# Patient Record
Sex: MALE | Race: WHITE | NOT HISPANIC OR LATINO | Employment: OTHER | URBAN - METROPOLITAN AREA
[De-identification: names, ages, dates, MRNs, and addresses within clinical notes are randomized per-mention and may not be internally consistent; named-entity substitution may affect disease eponyms.]

---

## 2023-04-03 LAB
ALBUMIN (G/DL) IN SER/PLAS: 4.1 G/DL (ref 3.4–5)
ANION GAP IN SER/PLAS: 15 MMOL/L (ref 10–20)
CALCIUM (MG/DL) IN SER/PLAS: 9.6 MG/DL (ref 8.6–10.3)
CARBON DIOXIDE, TOTAL (MMOL/L) IN SER/PLAS: 25 MMOL/L (ref 21–32)
CHLORIDE (MMOL/L) IN SER/PLAS: 104 MMOL/L (ref 98–107)
CREATININE (MG/DL) IN SER/PLAS: 1.51 MG/DL (ref 0.5–1.3)
GFR MALE: 47 ML/MIN/1.73M2
GLUCOSE (MG/DL) IN SER/PLAS: 100 MG/DL (ref 74–99)
MAGNESIUM (MG/DL) IN SER/PLAS: 2.3 MG/DL (ref 1.6–2.4)
NATRIURETIC PEPTIDE B (PG/ML) IN SER/PLAS: 811 PG/ML (ref 0–99)
PHOSPHATE (MG/DL) IN SER/PLAS: 3.7 MG/DL (ref 2.5–4.9)
POTASSIUM (MMOL/L) IN SER/PLAS: 4.2 MMOL/L (ref 3.5–5.3)
SODIUM (MMOL/L) IN SER/PLAS: 140 MMOL/L (ref 136–145)
UREA NITROGEN (MG/DL) IN SER/PLAS: 36 MG/DL (ref 6–23)

## 2023-04-19 ENCOUNTER — HOSPITAL ENCOUNTER (OUTPATIENT)
Dept: DATA CONVERSION | Facility: HOSPITAL | Age: 78
End: 2023-04-19
Attending: INTERNAL MEDICINE | Admitting: INTERNAL MEDICINE
Payer: MEDICARE

## 2023-04-19 DIAGNOSIS — I20.9 ANGINA PECTORIS, UNSPECIFIED (CMS-HCC): ICD-10-CM

## 2023-04-19 DIAGNOSIS — I10 ESSENTIAL (PRIMARY) HYPERTENSION: ICD-10-CM

## 2023-04-19 DIAGNOSIS — I47.29 OTHER VENTRICULAR TACHYCARDIA (MULTI): ICD-10-CM

## 2023-04-19 DIAGNOSIS — E85.9 AMYLOIDOSIS, UNSPECIFIED (MULTI): ICD-10-CM

## 2023-04-19 DIAGNOSIS — E78.5 HYPERLIPIDEMIA, UNSPECIFIED: ICD-10-CM

## 2023-04-19 DIAGNOSIS — N19 UNSPECIFIED KIDNEY FAILURE: ICD-10-CM

## 2023-04-19 DIAGNOSIS — I25.10 ATHEROSCLEROTIC HEART DISEASE OF NATIVE CORONARY ARTERY WITHOUT ANGINA PECTORIS: ICD-10-CM

## 2023-04-19 DIAGNOSIS — R94.39 ABNORMAL RESULT OF OTHER CARDIOVASCULAR FUNCTION STUDY: ICD-10-CM

## 2023-04-19 DIAGNOSIS — I42.9 CARDIOMYOPATHY, UNSPECIFIED (MULTI): ICD-10-CM

## 2023-04-19 LAB
FIO2: 21 %
FIO2: 21 % (ref 21–100)
PATIENT TEMPERATURE: 37 DEGREES
PATIENT TEMPERATURE: 37 DEGREES C
POCT BASE EXCESS, ARTERIAL: -2.6 MMOL/L (ref -2–3)
POCT BASE EXCESS, MIXED: 1.5 MMOL/L
POCT HCO3, ARTERIAL: 21.8 MMOL/L (ref 22–26)
POCT HCO3, MIXED: 26.6 MMOL/L
POCT OXY HEMOGLOBIN, ARTERIAL: 89.1 % (ref 94–98)
POCT OXY HEMOGLOBIN, MIXED: 62.2 % (ref 45–75)
POCT PCO2, ARTERIAL: 36 MMHG (ref 38–42)
POCT PCO2, MIXED: 43 MMHG
POCT PH, ARTERIAL: 7.39 (ref 7.38–7.42)
POCT PH, MIXED: 7.4
POCT PO2, ARTERIAL: 61 MMHG (ref 85–95)
POCT PO2, MIXED: 36 MMHG
POCT SO2, ARTERIAL: 91 % (ref 94–100)
POCT SO2, MIXED: 63 %
SITE OF ARTERIAL PUNCTURE: ABNORMAL

## 2023-04-20 LAB
ATRIAL RATE: 50 BPM
Q ONSET: 167 MS
QRS COUNT: 16 BEATS
QRS DURATION: 190 MS
QT INTERVAL: 466 MS
QTC CALCULATION(BAZETT): 591 MS
QTC FREDERICIA: 546 MS
R AXIS: 257 DEGREES
T AXIS: 50 DEGREES
T OFFSET: 400 MS
VENTRICULAR RATE: 97 BPM

## 2023-04-24 LAB
ALBUMIN (G/DL) IN SER/PLAS: 4.1 G/DL (ref 3.4–5)
ANION GAP IN SER/PLAS: 11 MMOL/L (ref 10–20)
CALCIUM (MG/DL) IN SER/PLAS: 9.6 MG/DL (ref 8.6–10.3)
CARBON DIOXIDE, TOTAL (MMOL/L) IN SER/PLAS: 28 MMOL/L (ref 21–32)
CHLORIDE (MMOL/L) IN SER/PLAS: 106 MMOL/L (ref 98–107)
CREATININE (MG/DL) IN SER/PLAS: 1.42 MG/DL (ref 0.5–1.3)
GFR MALE: 50 ML/MIN/1.73M2
GLUCOSE (MG/DL) IN SER/PLAS: 83 MG/DL (ref 74–99)
PHOSPHATE (MG/DL) IN SER/PLAS: 2.7 MG/DL (ref 2.5–4.9)
POTASSIUM (MMOL/L) IN SER/PLAS: 4.6 MMOL/L (ref 3.5–5.3)
SODIUM (MMOL/L) IN SER/PLAS: 140 MMOL/L (ref 136–145)
UREA NITROGEN (MG/DL) IN SER/PLAS: 28 MG/DL (ref 6–23)

## 2023-05-24 LAB
ALBUMIN (G/DL) IN SER/PLAS: 4.1 G/DL (ref 3.4–5)
ANION GAP IN SER/PLAS: 11 MMOL/L (ref 10–20)
CALCIUM (MG/DL) IN SER/PLAS: 9 MG/DL (ref 8.6–10.3)
CARBON DIOXIDE, TOTAL (MMOL/L) IN SER/PLAS: 28 MMOL/L (ref 21–32)
CHLORIDE (MMOL/L) IN SER/PLAS: 104 MMOL/L (ref 98–107)
CREATININE (MG/DL) IN SER/PLAS: 1.61 MG/DL (ref 0.5–1.3)
GFR MALE: 43 ML/MIN/1.73M2
GLUCOSE (MG/DL) IN SER/PLAS: 102 MG/DL (ref 74–99)
NATRIURETIC PEPTIDE B (PG/ML) IN SER/PLAS: 942 PG/ML (ref 0–99)
PHOSPHATE (MG/DL) IN SER/PLAS: 3.2 MG/DL (ref 2.5–4.9)
POTASSIUM (MMOL/L) IN SER/PLAS: 4 MMOL/L (ref 3.5–5.3)
SODIUM (MMOL/L) IN SER/PLAS: 139 MMOL/L (ref 136–145)
UREA NITROGEN (MG/DL) IN SER/PLAS: 35 MG/DL (ref 6–23)

## 2023-06-26 ENCOUNTER — PATIENT OUTREACH (OUTPATIENT)
Dept: PRIMARY CARE | Facility: CLINIC | Age: 78
End: 2023-06-26
Payer: MEDICARE

## 2023-06-26 RX ORDER — METOPROLOL SUCCINATE 50 MG/1
1 TABLET, EXTENDED RELEASE ORAL DAILY
COMMUNITY

## 2023-06-26 RX ORDER — NITROGLYCERIN 0.4 MG/1
0.4 TABLET SUBLINGUAL EVERY 5 MIN PRN
COMMUNITY
End: 2024-05-19 | Stop reason: HOSPADM

## 2023-06-26 RX ORDER — OXYBUTYNIN CHLORIDE 10 MG/1
1 TABLET, EXTENDED RELEASE ORAL DAILY
COMMUNITY
Start: 2017-02-03 | End: 2024-05-19 | Stop reason: HOSPADM

## 2023-06-26 RX ORDER — AMIODARONE HYDROCHLORIDE 200 MG/1
TABLET ORAL DAILY
COMMUNITY
Start: 2023-06-23 | End: 2023-12-04 | Stop reason: ALTCHOICE

## 2023-06-26 RX ORDER — TORSEMIDE 20 MG/1
1 TABLET ORAL DAILY
COMMUNITY
Start: 2023-04-03 | End: 2024-01-08 | Stop reason: SDUPTHER

## 2023-06-26 RX ORDER — DAPAGLIFLOZIN 10 MG/1
1 TABLET, FILM COATED ORAL DAILY
COMMUNITY
Start: 2022-07-11 | End: 2024-01-08 | Stop reason: SDUPTHER

## 2023-06-26 RX ORDER — TAFAMIDIS 61 MG/1
1 CAPSULE, LIQUID FILLED ORAL DAILY
COMMUNITY
Start: 2022-06-22 | End: 2024-05-19 | Stop reason: HOSPADM

## 2023-06-26 RX ORDER — SACUBITRIL AND VALSARTAN 24; 26 MG/1; MG/1
0.5 TABLET, FILM COATED ORAL DAILY
COMMUNITY
Start: 2023-04-28 | End: 2024-03-07 | Stop reason: HOSPADM

## 2023-06-26 NOTE — PROGRESS NOTES
Discharge Facility: [facility]  Discharge Diagnosis: AICD discharge, ventricular tachycardia  Admission Date: 20-Jun-2023  Discharge Date:  23-Jun-2023    PCP Appointment Date: 7/17 outside of rec 14 day window. Office tasked to provide earlier appt if possible  Specialist Appointment Date: 8/23 Dr. Ramicore-roque TBD for other cardiology docs  Hospital Encounter and Summary: Linked  See discharge assessment below for further details  Engagement  Call Start Time: 1615 (6/26/2023  4:24 PM)    Medications  Medications reviewed with patient/caregiver?: Yes (new meds/changes discussed) (6/26/2023  4:24 PM)  Is the patient having any side effects they believe may be caused by any medication additions or changes?: No (6/26/2023  4:24 PM)  Does the patient have all medications ordered at discharge?: Yes (6/26/2023  4:24 PM)  Care Management Interventions: No intervention needed (6/26/2023  4:24 PM)  Prescription Comments: see med list (new meds/changes: metoprolol, amiodarone, eliquis) (6/26/2023  4:24 PM)  Is the patient taking all medications as directed (includes completed medication regime)?: Yes (6/26/2023  4:24 PM)  Care Management Interventions: Provided patient education (6/26/2023  4:24 PM)    Appointments  Does the patient have a primary care provider?: Yes (6/26/2023  4:24 PM)  Care Management Interventions: Advised patient to make appointment (office tasked to provide earlier appt date for patient if possible) (6/26/2023  4:24 PM)  Has the patient kept scheduled appointments due by today?: Yes (6/26/2023  4:24 PM)  Care Management Interventions: Advised patient to keep appointment (6/26/2023  4:24 PM)    Self Management  What is the home health agency?: denies need (6/26/2023  4:24 PM)    Patient Teaching  Does the patient have access to their discharge instructions?: Yes (6/26/2023  4:24 PM)  Care Management Interventions: Reviewed instructions with patient (6/26/2023  4:24 PM)  What is the  "patient's perception of their health status since discharge?: Same (Patient states he is \"miserable\" but is the same from discharge from hospital. States no pain or SOB at time of outreach call. Family there to support as needed.) (6/26/2023  4:24 PM)  Is the patient/caregiver able to teach back the hierarchy of who to call/visit for symptoms/problems? PCP, Specialist, Home Health nurse, Urgent Care, ED, 911: Yes (6/26/2023  4:24 PM)        "

## 2023-06-29 ENCOUNTER — OFFICE VISIT (OUTPATIENT)
Dept: PRIMARY CARE | Facility: CLINIC | Age: 78
End: 2023-06-29
Payer: MEDICARE

## 2023-06-29 VITALS
HEART RATE: 76 BPM | SYSTOLIC BLOOD PRESSURE: 120 MMHG | BODY MASS INDEX: 26.22 KG/M2 | DIASTOLIC BLOOD PRESSURE: 68 MMHG | HEIGHT: 69 IN | WEIGHT: 177 LBS | OXYGEN SATURATION: 96 %

## 2023-06-29 DIAGNOSIS — E85.4 CARDIAC AMYLOIDOSIS (MULTI): ICD-10-CM

## 2023-06-29 DIAGNOSIS — I43 CARDIAC AMYLOIDOSIS (MULTI): ICD-10-CM

## 2023-06-29 DIAGNOSIS — I10 BENIGN ESSENTIAL HTN: ICD-10-CM

## 2023-06-29 DIAGNOSIS — I48.0 PAROXYSMAL ATRIAL FIBRILLATION (MULTI): Primary | ICD-10-CM

## 2023-06-29 PROBLEM — M19.90 ARTHRITIS: Status: ACTIVE | Noted: 2023-06-29

## 2023-06-29 PROBLEM — I25.10 ATHSCL HEART DISEASE OF NATIVE CORONARY ARTERY W/O ANG PCTRS: Status: ACTIVE | Noted: 2023-06-29

## 2023-06-29 PROBLEM — I47.20 VENTRICULAR TACHYCARDIA (MULTI): Status: ACTIVE | Noted: 2023-06-29

## 2023-06-29 PROBLEM — M20.41 ACQUIRED HAMMER TOES OF BOTH FEET: Status: ACTIVE | Noted: 2018-02-20

## 2023-06-29 PROBLEM — I73.9 PERIPHERAL VASCULAR DISEASE (CMS-HCC): Status: ACTIVE | Noted: 2017-12-12

## 2023-06-29 PROBLEM — E78.00 HYPERCHOLESTEROLEMIA: Status: ACTIVE | Noted: 2023-06-29

## 2023-06-29 PROBLEM — I51.9 CHRONIC SYSTOLIC DYSFUNCTION OF LEFT VENTRICLE: Status: ACTIVE | Noted: 2021-03-17

## 2023-06-29 PROBLEM — I50.22 CHRONIC SYSTOLIC (CONGESTIVE) HEART FAILURE (MULTI): Status: ACTIVE | Noted: 2023-06-29

## 2023-06-29 PROBLEM — M20.42 ACQUIRED HAMMER TOES OF BOTH FEET: Status: ACTIVE | Noted: 2018-02-20

## 2023-06-29 PROBLEM — I71.20 THORACIC AORTIC ANEURYSM (CMS-HCC): Status: ACTIVE | Noted: 2023-06-29

## 2023-06-29 PROCEDURE — 3074F SYST BP LT 130 MM HG: CPT | Performed by: FAMILY MEDICINE

## 2023-06-29 PROCEDURE — 99496 TRANSJ CARE MGMT HIGH F2F 7D: CPT | Performed by: FAMILY MEDICINE

## 2023-06-29 PROCEDURE — 3078F DIAST BP <80 MM HG: CPT | Performed by: FAMILY MEDICINE

## 2023-06-29 PROCEDURE — 1159F MED LIST DOCD IN RCRD: CPT | Performed by: FAMILY MEDICINE

## 2023-06-29 ASSESSMENT — ENCOUNTER SYMPTOMS
PALPITATIONS: 1
ANAL BLEEDING: 0
NEUROLOGICAL NEGATIVE: 1
CONSTIPATION: 1
CONSTITUTIONAL NEGATIVE: 1
ABDOMINAL PAIN: 0
RESPIRATORY NEGATIVE: 1
ABDOMINAL DISTENTION: 0
MUSCULOSKELETAL NEGATIVE: 1

## 2023-06-29 NOTE — PROGRESS NOTES
Subjective   Patient ID: Oleg Yang is a 78 y.o. male.    HPI  Recent hosp with arrythmia had defib go off at home feels good now   Review of Systems   Constitutional: Negative.    HENT: Negative.     Respiratory: Negative.     Cardiovascular:  Positive for palpitations. Negative for chest pain and leg swelling.   Gastrointestinal:  Positive for constipation. Negative for abdominal distention, abdominal pain and anal bleeding.   Genitourinary: Negative.    Musculoskeletal: Negative.    Skin: Negative.    Neurological: Negative.        Objective   Physical Exam  Constitutional:       Appearance: Normal appearance.   HENT:      Head: Normocephalic.      Nose: Nose normal.      Mouth/Throat:      Mouth: Mucous membranes are moist.   Eyes:      Extraocular Movements: Extraocular movements intact.      Pupils: Pupils are equal, round, and reactive to light.   Cardiovascular:      Rate and Rhythm: Normal rate and regular rhythm.   Pulmonary:      Effort: Pulmonary effort is normal.      Breath sounds: Normal breath sounds.   Abdominal:      Hernia: A hernia is present.   Musculoskeletal:         General: Normal range of motion.      Cervical back: Normal range of motion.   Skin:     General: Skin is warm and dry.   Neurological:      Mental Status: He is alert.         Assessment/Plan   There are no diagnoses linked to this encounter.

## 2023-06-30 LAB
ANION GAP IN SER/PLAS: 10 MMOL/L (ref 10–20)
CALCIUM (MG/DL) IN SER/PLAS: 9.4 MG/DL (ref 8.6–10.3)
CARBON DIOXIDE, TOTAL (MMOL/L) IN SER/PLAS: 28 MMOL/L (ref 21–32)
CHLORIDE (MMOL/L) IN SER/PLAS: 105 MMOL/L (ref 98–107)
CREATININE (MG/DL) IN SER/PLAS: 1.11 MG/DL (ref 0.5–1.3)
GFR MALE: 68 ML/MIN/1.73M2
GLUCOSE (MG/DL) IN SER/PLAS: 93 MG/DL (ref 74–99)
POTASSIUM (MMOL/L) IN SER/PLAS: 4.1 MMOL/L (ref 3.5–5.3)
SODIUM (MMOL/L) IN SER/PLAS: 139 MMOL/L (ref 136–145)
UREA NITROGEN (MG/DL) IN SER/PLAS: 22 MG/DL (ref 6–23)

## 2023-07-13 ENCOUNTER — PATIENT OUTREACH (OUTPATIENT)
Dept: PRIMARY CARE | Facility: CLINIC | Age: 78
End: 2023-07-13
Payer: MEDICARE

## 2023-07-17 ENCOUNTER — APPOINTMENT (OUTPATIENT)
Dept: PRIMARY CARE | Facility: CLINIC | Age: 78
End: 2023-07-17
Payer: MEDICARE

## 2023-07-27 ENCOUNTER — OFFICE VISIT (OUTPATIENT)
Dept: PRIMARY CARE | Facility: CLINIC | Age: 78
End: 2023-07-27
Payer: MEDICARE

## 2023-07-27 VITALS
BODY MASS INDEX: 26.66 KG/M2 | DIASTOLIC BLOOD PRESSURE: 53 MMHG | WEIGHT: 180 LBS | HEIGHT: 69 IN | SYSTOLIC BLOOD PRESSURE: 93 MMHG | HEART RATE: 74 BPM | OXYGEN SATURATION: 89 %

## 2023-07-27 DIAGNOSIS — L73.9 FOLLICULITIS: Primary | ICD-10-CM

## 2023-07-27 PROCEDURE — 3074F SYST BP LT 130 MM HG: CPT | Performed by: FAMILY MEDICINE

## 2023-07-27 PROCEDURE — 3078F DIAST BP <80 MM HG: CPT | Performed by: FAMILY MEDICINE

## 2023-07-27 PROCEDURE — 1159F MED LIST DOCD IN RCRD: CPT | Performed by: FAMILY MEDICINE

## 2023-07-27 PROCEDURE — 99214 OFFICE O/P EST MOD 30 MIN: CPT | Performed by: FAMILY MEDICINE

## 2023-07-27 PROCEDURE — 1126F AMNT PAIN NOTED NONE PRSNT: CPT | Performed by: FAMILY MEDICINE

## 2023-07-27 RX ORDER — ACETAMINOPHEN 325 MG/1
650 TABLET ORAL EVERY 4 HOURS PRN
COMMUNITY

## 2023-07-27 RX ORDER — DOXYCYCLINE 100 MG/1
100 CAPSULE ORAL 2 TIMES DAILY
Qty: 28 CAPSULE | Refills: 0 | Status: SHIPPED | OUTPATIENT
Start: 2023-07-27 | End: 2023-08-11 | Stop reason: SDUPTHER

## 2023-07-27 ASSESSMENT — ENCOUNTER SYMPTOMS
RESPIRATORY NEGATIVE: 1
WOUND: 1
CONSTITUTIONAL NEGATIVE: 1
CARDIOVASCULAR NEGATIVE: 1
GASTROINTESTINAL NEGATIVE: 1

## 2023-07-27 ASSESSMENT — PATIENT HEALTH QUESTIONNAIRE - PHQ9
SUM OF ALL RESPONSES TO PHQ9 QUESTIONS 1 AND 2: 0
2. FEELING DOWN, DEPRESSED OR HOPELESS: NOT AT ALL
1. LITTLE INTEREST OR PLEASURE IN DOING THINGS: NOT AT ALL

## 2023-07-27 NOTE — PROGRESS NOTES
Subjective   Patient ID: Oleg Yang is a 78 y.o. male.    HPI  Folliculitis knee arms buttox  Review of Systems   Constitutional: Negative.    HENT: Negative.     Respiratory: Negative.     Cardiovascular: Negative.    Gastrointestinal: Negative.    Skin:  Positive for rash and wound.       Objective   Physical Exam  Constitutional:       Appearance: Normal appearance.   HENT:      Head: Normocephalic and atraumatic.      Mouth/Throat:      Mouth: Mucous membranes are moist.   Eyes:      Extraocular Movements: Extraocular movements intact.      Pupils: Pupils are equal, round, and reactive to light.   Cardiovascular:      Rate and Rhythm: Normal rate and regular rhythm.   Pulmonary:      Effort: Pulmonary effort is normal.      Breath sounds: Normal breath sounds.   Abdominal:      General: Abdomen is flat.      Palpations: Abdomen is soft.   Musculoskeletal:         General: Normal range of motion.   Skin:     Findings: Erythema, lesion and rash present.   Neurological:      Mental Status: He is alert.     R knee folliculitis with erythema painfull rash     Assessment/Plan   Diagnoses and all orders for this visit:  Folliculitis  -     doxycycline (Vibramycin) 100 mg capsule; Take 1 capsule (100 mg) by mouth 2 times a day for 14 days. Take with at least 8 ounces (large glass) of water, do not lie down for 30 minutes after

## 2023-08-03 ENCOUNTER — APPOINTMENT (OUTPATIENT)
Dept: PRIMARY CARE | Facility: CLINIC | Age: 78
End: 2023-08-03
Payer: MEDICARE

## 2023-08-11 ENCOUNTER — OFFICE VISIT (OUTPATIENT)
Dept: PRIMARY CARE | Facility: CLINIC | Age: 78
End: 2023-08-11
Payer: MEDICARE

## 2023-08-11 VITALS
OXYGEN SATURATION: 98 % | HEIGHT: 69 IN | WEIGHT: 171 LBS | HEART RATE: 81 BPM | DIASTOLIC BLOOD PRESSURE: 74 MMHG | SYSTOLIC BLOOD PRESSURE: 121 MMHG | BODY MASS INDEX: 25.33 KG/M2

## 2023-08-11 DIAGNOSIS — I10 BENIGN ESSENTIAL HTN: Primary | ICD-10-CM

## 2023-08-11 DIAGNOSIS — L73.9 FOLLICULITIS: ICD-10-CM

## 2023-08-11 PROCEDURE — 3078F DIAST BP <80 MM HG: CPT | Performed by: FAMILY MEDICINE

## 2023-08-11 PROCEDURE — 3074F SYST BP LT 130 MM HG: CPT | Performed by: FAMILY MEDICINE

## 2023-08-11 PROCEDURE — 99213 OFFICE O/P EST LOW 20 MIN: CPT | Performed by: FAMILY MEDICINE

## 2023-08-11 PROCEDURE — 1126F AMNT PAIN NOTED NONE PRSNT: CPT | Performed by: FAMILY MEDICINE

## 2023-08-11 PROCEDURE — 1159F MED LIST DOCD IN RCRD: CPT | Performed by: FAMILY MEDICINE

## 2023-08-11 RX ORDER — DOXYCYCLINE 100 MG/1
100 CAPSULE ORAL 2 TIMES DAILY
Qty: 28 CAPSULE | Refills: 0 | Status: SHIPPED | OUTPATIENT
Start: 2023-08-11 | End: 2023-08-25 | Stop reason: SDUPTHER

## 2023-08-11 NOTE — PROGRESS NOTES
Subjective   Patient ID: Oleg Yang is a 78 y.o. male.    HPI  Pt leg improved less swelling inflamation down seems to be healing told him to continue to keep it clean I think probably keep him on antibiotics for another couple weeks and recheck it.Review of Systems   Constitutional: Negative.    HENT: Negative.     Respiratory: Negative.     Cardiovascular: Negative.    Gastrointestinal: Negative.    Skin:  Positive for rash and wound.       Objective   Physical Exam  Constitutional:       Appearance: Normal appearance.   HENT:      Head: Normocephalic and atraumatic.      Mouth/Throat:      Mouth: Mucous membranes are moist.   Eyes:      Extraocular Movements: Extraocular movements intact.      Pupils: Pupils are equal, round, and reactive to light.   Cardiovascular:      Rate and Rhythm: Normal rate and regular rhythm.   Pulmonary:      Effort: Pulmonary effort is normal.      Breath sounds: Normal breath sounds.   Abdominal:      General: Abdomen is flat.   Review of Systems    Objective   Physical Exam    Assessment/Plan   There are no diagnoses linked to this encounter.

## 2023-08-16 ENCOUNTER — PATIENT OUTREACH (OUTPATIENT)
Dept: PRIMARY CARE | Facility: CLINIC | Age: 78
End: 2023-08-16
Payer: MEDICARE

## 2023-08-16 NOTE — PROGRESS NOTES
Call placed regarding two month post discharge follow up call.  At time of outreach call the patient feels as if their condition has returned to baselin since initial visit with PCP or specialist. Patient states the leg is looking and feeling better. No need for any refills at this time.   Reviewed any PCP or specialists progress notes/labs/radiology reports if applicable and addressed any questions or concerns.

## 2023-08-25 ENCOUNTER — OFFICE VISIT (OUTPATIENT)
Dept: PRIMARY CARE | Facility: CLINIC | Age: 78
End: 2023-08-25
Payer: MEDICARE

## 2023-08-25 VITALS
BODY MASS INDEX: 25.77 KG/M2 | OXYGEN SATURATION: 96 % | SYSTOLIC BLOOD PRESSURE: 118 MMHG | HEART RATE: 76 BPM | HEIGHT: 69 IN | DIASTOLIC BLOOD PRESSURE: 68 MMHG | WEIGHT: 174 LBS

## 2023-08-25 DIAGNOSIS — L73.9 FOLLICULITIS: Primary | ICD-10-CM

## 2023-08-25 PROCEDURE — 1159F MED LIST DOCD IN RCRD: CPT | Performed by: FAMILY MEDICINE

## 2023-08-25 PROCEDURE — 1126F AMNT PAIN NOTED NONE PRSNT: CPT | Performed by: FAMILY MEDICINE

## 2023-08-25 PROCEDURE — 3078F DIAST BP <80 MM HG: CPT | Performed by: FAMILY MEDICINE

## 2023-08-25 PROCEDURE — 3074F SYST BP LT 130 MM HG: CPT | Performed by: FAMILY MEDICINE

## 2023-08-25 PROCEDURE — 99213 OFFICE O/P EST LOW 20 MIN: CPT | Performed by: FAMILY MEDICINE

## 2023-08-25 RX ORDER — MUPIROCIN 20 MG/G
OINTMENT TOPICAL 3 TIMES DAILY
Qty: 22 G | Refills: 0 | Status: SHIPPED | OUTPATIENT
Start: 2023-08-25 | End: 2023-09-04

## 2023-08-25 RX ORDER — DOXYCYCLINE 100 MG/1
100 CAPSULE ORAL 2 TIMES DAILY
Qty: 28 CAPSULE | Refills: 0 | Status: SHIPPED | OUTPATIENT
Start: 2023-08-25 | End: 2023-09-08

## 2023-08-25 NOTE — PROGRESS NOTES
Constitutional: Negative.    HENT: Negative.     Respiratory: Negative.     Cardiovascular: Negative.    Gastrointestinal: Negative.    Skin:  Positive for rash and wound.       Objective   Physical Exam  Constitutional:       Appearance: Normal appearance.   HENT:      Head: Normocephalic and atraumatic.      Mouth/Throat:      Mouth: Mucous membranes are moist.   Eyes:      Extraocular Movements: Extraocular movements intact.      Pupils: Pupils are equal, round, and reactive to light.   Cardiovascular:      Rate and Rhythm: Normal rate and regular rhythm.   Pulmonary:      Effort: Pulmonary effort is normal.      Breath sounds: Normal breath sounds.   Abdominal:      General: Abdomen is flat. Subjective   Patient ID: Oleg Yang is a 78 y.o. male.    HPI  Improved knee has skin tear on l leg   Review of Systems  No acute complaints  Objective   Physical Exam  Patient's lower legs improved he still is picking at his small areas of irritation at home leave it alone patient's has a skin tear on his left lower leg which seems healthy but no evidence of infection.  Assessment/Plan   There are no diagnoses linked to this encounter.

## 2023-09-08 PROBLEM — R60.0 LOWER LEG EDEMA: Status: ACTIVE | Noted: 2019-10-17

## 2023-09-08 PROBLEM — L60.0 INGROWING NAIL: Status: ACTIVE | Noted: 2017-10-10

## 2023-09-08 PROBLEM — M79.673 FOOT PAIN: Status: ACTIVE | Noted: 2017-10-10

## 2023-09-08 PROBLEM — B35.1 ONYCHOMYCOSIS: Status: ACTIVE | Noted: 2017-10-10

## 2023-09-08 PROBLEM — R94.39 ABNORMAL STRESS TEST: Status: ACTIVE | Noted: 2023-09-08

## 2023-09-08 PROBLEM — R26.2 DISABILITY OF WALKING: Status: ACTIVE | Noted: 2017-12-12

## 2023-09-08 PROBLEM — I42.9 CARDIOMYOPATHY (MULTI): Status: ACTIVE | Noted: 2023-09-08

## 2023-09-08 PROBLEM — I83.90 ASYMPTOMATIC VARICOSE VEINS: Status: ACTIVE | Noted: 2023-09-08

## 2023-09-08 PROBLEM — I77.9 PERIPHERAL ARTERIAL OCCLUSIVE DISEASE (CMS-HCC): Status: ACTIVE | Noted: 2019-10-17

## 2023-09-08 PROBLEM — L84 FOOT CALLUS: Status: ACTIVE | Noted: 2019-10-17

## 2023-09-08 PROBLEM — E66.09 CLASS 1 OBESITY DUE TO EXCESS CALORIES WITH BODY MASS INDEX (BMI) OF 32.0 TO 32.9 IN ADULT: Status: ACTIVE | Noted: 2023-09-08

## 2023-09-08 PROBLEM — K40.90 INGUINAL HERNIA: Status: ACTIVE | Noted: 2023-09-08

## 2023-09-08 PROBLEM — I48.91 ATRIAL FIBRILLATION (MULTI): Status: ACTIVE | Noted: 2023-09-08

## 2023-09-08 PROBLEM — M79.675 PAIN OF TOE OF LEFT FOOT: Status: ACTIVE | Noted: 2019-10-17

## 2023-09-08 PROBLEM — I42.0 DILATED CARDIOMYOPATHY (MULTI): Status: ACTIVE | Noted: 2023-09-08

## 2023-09-08 PROBLEM — K40.20 BILATERAL INGUINAL HERNIA: Status: ACTIVE | Noted: 2023-09-08

## 2023-09-08 PROBLEM — M21.969 DEFORMITY OF METATARSAL: Status: ACTIVE | Noted: 2018-02-20

## 2023-09-08 RX ORDER — POLYETHYLENE GLYCOL 3350 17 G/17G
17 POWDER, FOR SOLUTION ORAL DAILY PRN
COMMUNITY
End: 2024-05-19 | Stop reason: HOSPADM

## 2023-09-08 RX ORDER — AMIODARONE HYDROCHLORIDE 400 MG/1
200 TABLET ORAL DAILY
COMMUNITY

## 2023-09-08 RX ORDER — ASPIRIN 81 MG/1
TABLET ORAL
COMMUNITY
End: 2023-12-04 | Stop reason: ALTCHOICE

## 2023-09-08 RX ORDER — LISINOPRIL 5 MG/1
TABLET ORAL
COMMUNITY
End: 2023-12-04 | Stop reason: ALTCHOICE

## 2023-09-08 RX ORDER — METOPROLOL SUCCINATE 200 MG/1
TABLET, EXTENDED RELEASE ORAL
COMMUNITY
End: 2023-12-04 | Stop reason: ALTCHOICE

## 2023-09-08 RX ORDER — FUROSEMIDE 20 MG/1
1 TABLET ORAL DAILY
COMMUNITY
End: 2023-12-04 | Stop reason: ALTCHOICE

## 2023-09-08 RX ORDER — TORSEMIDE 10 MG/1
TABLET ORAL
COMMUNITY
End: 2023-12-04 | Stop reason: ALTCHOICE

## 2023-09-08 RX ORDER — TRAMADOL HYDROCHLORIDE 50 MG/1
TABLET ORAL
COMMUNITY
End: 2023-12-04 | Stop reason: ALTCHOICE

## 2023-09-08 RX ORDER — OMEPRAZOLE 20 MG/1
CAPSULE, DELAYED RELEASE ORAL
COMMUNITY
End: 2023-12-04 | Stop reason: ALTCHOICE

## 2023-09-08 RX ORDER — LISINOPRIL 20 MG/1
TABLET ORAL
COMMUNITY
End: 2023-12-04 | Stop reason: ALTCHOICE

## 2023-09-08 RX ORDER — METOPROLOL SUCCINATE 100 MG/1
TABLET, EXTENDED RELEASE ORAL
COMMUNITY
End: 2023-12-04 | Stop reason: ALTCHOICE

## 2023-09-08 RX ORDER — AMOXICILLIN 500 MG/1
2000 CAPSULE ORAL
COMMUNITY
End: 2024-01-08 | Stop reason: WASHOUT

## 2023-09-08 RX ORDER — LANOLIN ALCOHOL/MO/W.PET/CERES
1 CREAM (GRAM) TOPICAL DAILY
COMMUNITY
Start: 2023-06-30 | End: 2024-05-19 | Stop reason: HOSPADM

## 2023-09-08 RX ORDER — ISOSORBIDE MONONITRATE 30 MG/1
TABLET, EXTENDED RELEASE ORAL
COMMUNITY
End: 2023-12-04 | Stop reason: ALTCHOICE

## 2023-09-08 RX ORDER — DAPAGLIFLOZIN 5 MG/1
TABLET, FILM COATED ORAL
COMMUNITY
End: 2023-12-04 | Stop reason: ALTCHOICE

## 2023-09-14 LAB
ALANINE AMINOTRANSFERASE (SGPT) (U/L) IN SER/PLAS: 31 U/L (ref 10–52)
ALBUMIN (G/DL) IN SER/PLAS: 3.8 G/DL (ref 3.4–5)
ALKALINE PHOSPHATASE (U/L) IN SER/PLAS: 171 U/L (ref 33–136)
ANION GAP IN SER/PLAS: 9 MMOL/L (ref 10–20)
ASPARTATE AMINOTRANSFERASE (SGOT) (U/L) IN SER/PLAS: 34 U/L (ref 9–39)
BILIRUBIN TOTAL (MG/DL) IN SER/PLAS: 0.8 MG/DL (ref 0–1.2)
CALCIUM (MG/DL) IN SER/PLAS: 9.1 MG/DL (ref 8.6–10.3)
CARBON DIOXIDE, TOTAL (MMOL/L) IN SER/PLAS: 28 MMOL/L (ref 21–32)
CHLORIDE (MMOL/L) IN SER/PLAS: 104 MMOL/L (ref 98–107)
CREATININE (MG/DL) IN SER/PLAS: 1.18 MG/DL (ref 0.5–1.3)
ERYTHROCYTE DISTRIBUTION WIDTH (RATIO) BY AUTOMATED COUNT: 12.7 % (ref 11.5–14.5)
ERYTHROCYTE MEAN CORPUSCULAR HEMOGLOBIN CONCENTRATION (G/DL) BY AUTOMATED: 32.1 G/DL (ref 32–36)
ERYTHROCYTE MEAN CORPUSCULAR VOLUME (FL) BY AUTOMATED COUNT: 99 FL (ref 80–100)
ERYTHROCYTES (10*6/UL) IN BLOOD BY AUTOMATED COUNT: 3.97 X10E12/L (ref 4.5–5.9)
GFR MALE: 63 ML/MIN/1.73M2
GLUCOSE (MG/DL) IN SER/PLAS: 100 MG/DL (ref 74–99)
HEMATOCRIT (%) IN BLOOD BY AUTOMATED COUNT: 39.3 % (ref 41–52)
HEMOGLOBIN (G/DL) IN BLOOD: 12.6 G/DL (ref 13.5–17.5)
LEUKOCYTES (10*3/UL) IN BLOOD BY AUTOMATED COUNT: 7.4 X10E9/L (ref 4.4–11.3)
NATRIURETIC PEPTIDE B (PG/ML) IN SER/PLAS: 751 PG/ML (ref 0–99)
NRBC (PER 100 WBCS) BY AUTOMATED COUNT: 0 /100 WBC (ref 0–0)
PLATELETS (10*3/UL) IN BLOOD AUTOMATED COUNT: 215 X10E9/L (ref 150–450)
POTASSIUM (MMOL/L) IN SER/PLAS: 4.3 MMOL/L (ref 3.5–5.3)
PROTEIN TOTAL: 6.8 G/DL (ref 6.4–8.2)
SODIUM (MMOL/L) IN SER/PLAS: 137 MMOL/L (ref 136–145)
THYROTROPIN (MIU/L) IN SER/PLAS BY DETECTION LIMIT <= 0.05 MIU/L: 2.12 MIU/L (ref 0.44–3.98)
UREA NITROGEN (MG/DL) IN SER/PLAS: 22 MG/DL (ref 6–23)

## 2023-09-14 NOTE — H&P
History & Physical Reviewed:   I have reviewed the History and Physical dated:  03-Apr-2023   History and Physical reviewed and relevant findings noted. Patient examined to review pertinent physical  findings.: No significant changes   Home Medications Reviewed: no changes noted   Allergies Reviewed: no changes noted       Airway/Sedation Assessment:  ·  Emotional Status calm   ·  Neurologic alert & oriented x 3   ·  Respiratory clear to auscultation   ·  Cardiovascular rhythm & rate regular   ·  GI/ soft, nontender     · Pulses present: Pedal Left, Pedal Right, Radial Left, Radial Right     ·  Mouth Opening OK yes   ·  Neck Flexibility OK yes   ·  Loose Teeth no     Oropharyngeal Classification:          ·  Oropharyngeal Classification Class II   ·  ASA PS Classification ASA III   ·  Sedation Plan moderate sedation       ERAS (Enhanced Recovery After Surgery):  ·  ERAS Patient: no     Consent:   COVID-19 Consent:  ·  COVID-19 Risk Consent Surgeon has reviewed key risks related to the risk of brooke COVID-19 and if they contract COVID-19 what the risks are.       Electronic Signatures:  Dell Smalls ( (Fellow))  (Signed 19-Apr-2023 07:31)   Authored: History & Physical Reviewed, Airway/Sedation,  ERAS, Consent, Note Completion      Last Updated: 19-Apr-2023 07:31 by Dell Smalls ( (Fellow))

## 2023-10-12 ENCOUNTER — HOSPITAL ENCOUNTER (OUTPATIENT)
Dept: CARDIOLOGY | Facility: CLINIC | Age: 78
Discharge: HOME | End: 2023-10-12
Payer: MEDICARE

## 2023-10-12 DIAGNOSIS — I49.5 SICK SINUS SYNDROME (MULTI): ICD-10-CM

## 2023-10-12 PROCEDURE — 93296 REM INTERROG EVL PM/IDS: CPT

## 2023-10-12 PROCEDURE — 93295 DEV INTERROG REMOTE 1/2/MLT: CPT | Performed by: INTERNAL MEDICINE

## 2023-10-16 DIAGNOSIS — I49.5 SICK SINUS SYNDROME (MULTI): ICD-10-CM

## 2023-10-16 DIAGNOSIS — Z95.0 CARDIAC PACEMAKER IN SITU: ICD-10-CM

## 2023-10-24 ENCOUNTER — OFFICE VISIT (OUTPATIENT)
Dept: PRIMARY CARE | Facility: CLINIC | Age: 78
End: 2023-10-24
Payer: MEDICARE

## 2023-10-24 VITALS
TEMPERATURE: 97.1 F | DIASTOLIC BLOOD PRESSURE: 75 MMHG | WEIGHT: 188 LBS | SYSTOLIC BLOOD PRESSURE: 137 MMHG | HEIGHT: 69 IN | HEART RATE: 75 BPM | BODY MASS INDEX: 27.85 KG/M2 | OXYGEN SATURATION: 97 %

## 2023-10-24 DIAGNOSIS — R60.0 LOWER LEG EDEMA: ICD-10-CM

## 2023-10-24 DIAGNOSIS — L03.115 CELLULITIS OF RIGHT LOWER EXTREMITY: ICD-10-CM

## 2023-10-24 DIAGNOSIS — I25.10 ATHSCL HEART DISEASE OF NATIVE CORONARY ARTERY W/O ANG PCTRS: Primary | ICD-10-CM

## 2023-10-24 PROCEDURE — 99214 OFFICE O/P EST MOD 30 MIN: CPT | Performed by: FAMILY MEDICINE

## 2023-10-24 PROCEDURE — 1159F MED LIST DOCD IN RCRD: CPT | Performed by: FAMILY MEDICINE

## 2023-10-24 PROCEDURE — 3075F SYST BP GE 130 - 139MM HG: CPT | Performed by: FAMILY MEDICINE

## 2023-10-24 PROCEDURE — 1126F AMNT PAIN NOTED NONE PRSNT: CPT | Performed by: FAMILY MEDICINE

## 2023-10-24 PROCEDURE — 3078F DIAST BP <80 MM HG: CPT | Performed by: FAMILY MEDICINE

## 2023-10-24 ASSESSMENT — PATIENT HEALTH QUESTIONNAIRE - PHQ9
1. LITTLE INTEREST OR PLEASURE IN DOING THINGS: NOT AT ALL
2. FEELING DOWN, DEPRESSED OR HOPELESS: NOT AT ALL
SUM OF ALL RESPONSES TO PHQ9 QUESTIONS 1 AND 2: 0

## 2023-10-24 NOTE — PROGRESS NOTES
Subjective   Patient ID: Oleg Yang is a 78 y.o. male who presents for Follow-up.  HPI  Wound improved patient's knee wound is improved no draining no discharge little bit of scabbing noted on the knee itself and some mild erythema patient is good to continue the medication as well as the cream we will follow-up as needed.  I told if anything changes and then gets worse he needs to come back immediately    Patient has no patient has no swelling no edema no chest pain congestive failure seems controlledConstitutional: Negative.    HENT: Negative.     Respiratory: Negative.     Cardiovascular: Negative.    Gastrointestinal: Negative.    Skin:  Positive for rash and wound.       Objective   Physical Exam  Constitutional:       Appearance: Normal appearance.   HENT:      Head: Normocephalic and atraumatic.      Mouth/Throat:      Mouth: Mucous membranes are moist.   Eyes:      Extraocular Movements: Extraocular movements intact.      Pupils: Pupils are equal, round, and reactive to light.   Cardiovascular:      Rate and Rhythm: Normal rate and regular rhythm.   Pulmonary:      Effort: Pulmonary effort is normal.      Breath sounds: Normal breath sounds.   Abdominal:      General: Abdomen is flat.    Skin on the bilateral knees is healed some erythema left residually no discharge drainage or wound.  Mild swelling in the lower extremity  Review of Systems    Objective   Physical Exam    Assessment/Plan

## 2023-12-04 ENCOUNTER — OFFICE VISIT (OUTPATIENT)
Dept: CARDIOLOGY | Facility: CLINIC | Age: 78
End: 2023-12-04
Payer: MEDICARE

## 2023-12-04 VITALS
BODY MASS INDEX: 29.33 KG/M2 | HEIGHT: 69 IN | WEIGHT: 198 LBS | DIASTOLIC BLOOD PRESSURE: 86 MMHG | HEART RATE: 85 BPM | OXYGEN SATURATION: 95 % | SYSTOLIC BLOOD PRESSURE: 140 MMHG

## 2023-12-04 DIAGNOSIS — I49.5 SICK SINUS SYNDROME (MULTI): Primary | ICD-10-CM

## 2023-12-04 DIAGNOSIS — I48.0 PAROXYSMAL ATRIAL FIBRILLATION (MULTI): ICD-10-CM

## 2023-12-04 PROCEDURE — 1159F MED LIST DOCD IN RCRD: CPT | Performed by: INTERNAL MEDICINE

## 2023-12-04 PROCEDURE — 3079F DIAST BP 80-89 MM HG: CPT | Performed by: INTERNAL MEDICINE

## 2023-12-04 PROCEDURE — 1126F AMNT PAIN NOTED NONE PRSNT: CPT | Performed by: INTERNAL MEDICINE

## 2023-12-04 PROCEDURE — 99214 OFFICE O/P EST MOD 30 MIN: CPT | Performed by: INTERNAL MEDICINE

## 2023-12-04 PROCEDURE — 93000 ELECTROCARDIOGRAM COMPLETE: CPT | Performed by: INTERNAL MEDICINE

## 2023-12-04 PROCEDURE — 3077F SYST BP >= 140 MM HG: CPT | Performed by: INTERNAL MEDICINE

## 2023-12-04 NOTE — ASSESSMENT & PLAN NOTE
1.  Paroxysmal atrial fibrillation: The patient is on amiodarone 100 mg daily, and is in sinus rhythm today.  Continue Eliquis for stroke risk reduction.  He is experiencing no side effects related to the atrial fibrillation.  The patient is not a candidate for pulmonary vein isolation.    2.  HFrEF: The patient will remain on the same heart failure medication regimen as outlined by Dr. Gonzalez.    3.  Ventricular tachycardia: History of a defibrillator insertion, which is functioning appropriately.  Continue follow-up through the ICD clinic as scheduled.

## 2023-12-04 NOTE — PROGRESS NOTES
"History Of Present Illness:      This is a 78-year-old male with a history of amyloidosis, VT, and atrial fibrillation.  He is on amiodarone 100 mg daily, and has had no symptomatic recurrences of atrial fibrillation.    The patient was initially seen in consultation on May 4, 2022 at the request of Dr. Parada.  The patient had increasing dyspnea on exertion and was diagnosed with amyloidosis.  Past medical history significant for hypertension, coronary artery disease, and hyperlipidemia    Review of Systems  Other review of systems negative     Last Recorded Vitals:      6/29/2023     8:43 AM 7/3/2023     9:04 AM 7/27/2023     8:55 AM 8/11/2023     9:05 AM 8/25/2023     8:17 AM 10/24/2023     8:21 AM 12/4/2023     9:32 AM   Vitals   Systolic 120 122 93 121 118 137 140   Diastolic 68 74 53 74 68 75 86   Heart Rate 76 58 74 81 76 75 85   Temp      36.2 °C (97.1 °F)    Height (in) 1.753 m (5' 9\") 1.753 m (5' 9\") 1.753 m (5' 9\") 1.753 m (5' 9\") 1.753 m (5' 9\") 1.753 m (5' 9\") 1.753 m (5' 9\")   Weight (lb) 177 173.25 180 171 174 188 198   BMI 26.14 kg/m2 25.58 kg/m2 26.58 kg/m2 25.25 kg/m2 25.7 kg/m2 27.76 kg/m2 29.24 kg/m2   BSA (m2) 1.98 m2 1.96 m2 1.99 m2 1.94 m2 1.96 m2 2.04 m2 2.09 m2   Visit Report Report  Report Report Report Report Report          Allergies:  Patient has no known allergies.    Outpatient Medications:  Current Outpatient Medications   Medication Instructions    acetaminophen (TYLENOL) 650 mg, oral, Every 4 hours PRN, For pain    amiodarone (Pacerone) 400 mg tablet Take 0.5 tablets (200 mg) by mouth once daily.    amoxicillin (AMOXIL) 2,000 mg, oral, Before dental procedure.    apixaban (ELIQUIS) 5 mg, oral, 2 times daily    Entresto 24-26 mg tablet oral, 2 times daily    Farxiga 10 mg 1 tablet, oral, Daily    magnesium oxide (Mag-Ox) 400 mg (241.3 mg magnesium) tablet 1 tablet, oral, Daily    metoprolol succinate XL (Toprol-XL) 50 mg 24 hr tablet 1 tablet, oral, Daily    nitroglycerin " (NITROSTAT) 0.4 mg, sublingual, Every 5 min PRN    oxybutynin XL (Ditropan-XL) 10 mg 24 hr tablet 1 tablet, oral, Daily    polyethylene glycol (GLYCOLAX, MIRALAX) 17 g, oral, Daily    torsemide (Demadex) 20 mg tablet 1 tablet, oral, Daily    Vyndamax 61 mg capsule 1 tablet, oral, Daily       Physical Exam:    General Appearance:  Alert, oriented, no distress  Skin:  Warm and dry  Head and Neck:  No elevation of JVP, no carotid bruits  Cardiac Exam:  Rhythm is regular, S1 and S2 are normal, grade 1/6 systolic murmur at the lower left sternal border  Lungs:  Clear to auscultation  Extremities:  no edema  Neurologic:  No focal deficits  Psychiatric:  Appropriate mood and behavior         Cardiology Tests:  I have personally review the diagnostic cardiac testing and my interpretation is as follows:    EKG December 4, 2023: Sinus rhythm with isolated PACs, incomplete right bundle branch block  Echocardiogram March 2022: Ejection fraction 25 to 30% with moderate to severe tricuspid regurgitation      Assessment/Plan   Problem List Items Addressed This Visit             ICD-10-CM    Paroxysmal atrial fibrillation (CMS/HCC) I48.0     1.  Paroxysmal atrial fibrillation: The patient is on amiodarone 100 mg daily, and is in sinus rhythm today.  Continue Eliquis for stroke risk reduction.  He is experiencing no side effects related to the atrial fibrillation.  The patient is not a candidate for pulmonary vein isolation.    2.  HFrEF: The patient will remain on the same heart failure medication regimen as outlined by Dr. Gonzalez.    3.  Ventricular tachycardia: History of a defibrillator insertion, which is functioning appropriately.  Continue follow-up through the ICD clinic as scheduled.         Relevant Medications    apixaban (Eliquis) 5 mg tablet     Other Visit Diagnoses         Codes    Sick sinus syndrome (CMS/HCC)    -  Primary I49.5    Relevant Orders    ECG 12 lead (Clinic Performed) (Completed)            Paramjit MCGRAW  [place], DO

## 2023-12-11 ENCOUNTER — HOSPITAL ENCOUNTER (OUTPATIENT)
Dept: CARDIOLOGY | Facility: CLINIC | Age: 78
Discharge: HOME | End: 2023-12-11
Payer: MEDICARE

## 2023-12-11 DIAGNOSIS — I48.0 PAROXYSMAL ATRIAL FIBRILLATION (MULTI): ICD-10-CM

## 2023-12-11 DIAGNOSIS — I49.5 SICK SINUS SYNDROME (MULTI): ICD-10-CM

## 2023-12-27 PROBLEM — I42.9 CARDIOMYOPATHY (MULTI): Status: RESOLVED | Noted: 2023-09-08 | Resolved: 2023-12-27

## 2023-12-27 PROBLEM — I73.9 PERIPHERAL VASCULAR DISEASE (CMS-HCC): Status: RESOLVED | Noted: 2017-12-12 | Resolved: 2023-12-27

## 2023-12-27 PROBLEM — K40.90 INGUINAL HERNIA: Status: RESOLVED | Noted: 2023-09-08 | Resolved: 2023-12-27

## 2023-12-27 PROBLEM — I51.9 CHRONIC SYSTOLIC DYSFUNCTION OF LEFT VENTRICLE: Status: RESOLVED | Noted: 2021-03-17 | Resolved: 2023-12-27

## 2023-12-27 PROBLEM — I48.91 ATRIAL FIBRILLATION (MULTI): Status: RESOLVED | Noted: 2023-09-08 | Resolved: 2023-12-27

## 2023-12-27 PROBLEM — I83.90 ASYMPTOMATIC VARICOSE VEINS: Status: RESOLVED | Noted: 2023-09-08 | Resolved: 2023-12-27

## 2023-12-27 PROBLEM — I83.93 VARICOSE VEINS OF BOTH LOWER EXTREMITIES: Status: ACTIVE | Noted: 2023-09-08

## 2024-01-03 NOTE — PROGRESS NOTES
Chief Complaint:   ASHD, Hypertension, Heart Failure, Atrial Fibrillation, tachycardia, and Cardiomyopathy (3 month follow up)     History Of Present Illness:       I had the pleasure of seeing . 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   We had managed his heart failure syndrome through a combination of guideline directed medical therapy, and tafamidis. He has tolerated this well, but has required some adjustment in his diuretic dose. He also underwent placement of an ICD, given his reduced ejection fraction by Dr. Ramicone. He was admitted with ICD shocks in March 2023, treated with diuresis, and placement on amiodarone. He was discharged home. We have seen him in this clinic, and sent him for cardiac catheterization on April 19, 2023, which showed no flow-limiting disease. At his last visit, we noted that he was doing well, but had a mechanical fall. We made no changes to his medication regimen  He was admitted unfortunately with ICD shocks. It appeared that these were related to initially atrial fibrillation, which later degenerated into ventricular tachycardia. His amiodarone was increased to 400 mg then subsequently down to 200 mg, and his Eliquis increased to 5 mg twice daily.. He underwent JALEN cardioversion and maintained sinus through the hospitalization.   We did continue him on amiodarone as well, for his ICD shocks, despite knowing that he was likely back in atrial fibrillation. We also noted that he continued to have a pericardial effusion which we elected to monitor.  At his last visit, we noted that he was doing reasonably well, with him taking as needed additional diuretics as needed.  His leg wound was noted to be improving.  He now comes for follow-up.  He is doing overall quite well.  He is able to do his ADLs without too much trouble, but overall is slowing down mostly due  "to joint issues.  He has not had to take additional as needed diuretics.  He continues to take his other medication religiously.  Denies any dizziness lightheadedness.  He continues to have occasional leg swelling.  This resolved spontaneously.  Last Recorded Vitals:  Vitals:    01/08/24 0828   BP: 128/72   BP Location: Left arm   Patient Position: Sitting   BP Cuff Size: Adult   Pulse: 99   SpO2: 96%   Weight: 91.8 kg (202 lb 6.4 oz)   Height: 1.702 m (5' 7\")       Past Medical History:  He has a past medical history of Other specified health status.    Past Surgical History:  He has a past surgical history that includes Knee surgery (06/21/2021); Total hip arthroplasty (06/21/2021); and Other surgical history (06/21/2021).      Social History:  He reports that he has never smoked. He has never used smokeless tobacco. No history on file for alcohol use and drug use.    Family History:  Family History   Problem Relation Name Age of Onset    Other (cardiac disorder) Mother      Coronary artery disease Mother      Other (cardiac disorder) Father      Coronary artery disease Father      Hypertension Father      Heart attack Father          Allergies:  Patient has no known allergies.    Outpatient Medications:  Current Outpatient Medications   Medication Instructions    acetaminophen (TYLENOL) 650 mg, oral, Every 4 hours PRN, For pain    amiodarone (Pacerone) 400 mg tablet Take 0.5 tablets (200 mg) by mouth once daily.    apixaban (ELIQUIS) 5 mg, oral, 2 times daily    Entresto 24-26 mg tablet oral, 2 times daily    Farxiga 10 mg 1 tablet, oral, Daily    magnesium oxide (Mag-Ox) 400 mg (241.3 mg magnesium) tablet 1 tablet, oral, Daily    metoprolol succinate XL (Toprol-XL) 50 mg 24 hr tablet 1 tablet, oral, Daily    nitroglycerin (NITROSTAT) 0.4 mg, sublingual, Every 5 min PRN    oxybutynin XL (Ditropan-XL) 10 mg 24 hr tablet 1 tablet, oral, Daily    polyethylene glycol (GLYCOLAX, MIRALAX) 17 g, oral, Daily    torsemide " (Demadex) 20 mg tablet 1 tablet, oral, Daily    Vyndamax 61 mg capsule 1 tablet, oral, Daily       Physical Exam:  Constitutional:       Appearance: Healthy appearance. Not in distress. Frail.   Neck:      Vascular: No JVR. JVD normal.   Pulmonary:      Effort: Pulmonary effort is normal.      Breath sounds: Normal breath sounds. No wheezing. No rhonchi. No rales.   Chest:      Chest wall: Not tender to palpatation.   Cardiovascular:      PMI at left midclavicular line. Normal rate. Regular rhythm. Normal S1. Normal S2.       Murmurs: There is no murmur.      No gallop.  No click. No rub.   Pulses:     Intact distal pulses.   Edema:     Pretibial: bilateral trace edema of the pretibial area.     Ankle: bilateral trace edema of the ankle.     Feet: bilateral trace edema of the feet.  Abdominal:      General: Bowel sounds are normal.      Palpations: Abdomen is soft.      Tenderness: There is no abdominal tenderness.   Musculoskeletal: Normal range of motion.         General: No tenderness. Skin:     General: Skin is warm and dry.   Neurological:      General: No focal deficit present.      Mental Status: Alert and oriented to person, place and time.        Last Labs:  CBC -  Lab Results   Component Value Date    WBC 7.4 09/14/2023    HGB 12.6 (L) 09/14/2023    HCT 39.3 (L) 09/14/2023    MCV 99 09/14/2023     09/14/2023       CMP -  Lab Results   Component Value Date    CALCIUM 9.1 09/14/2023    PHOS 3.2 05/24/2023    PROT 6.8 09/14/2023    ALBUMIN 3.8 09/14/2023    AST 34 09/14/2023    ALT 31 09/14/2023    ALKPHOS 171 (H) 09/14/2023    BILITOT 0.8 09/14/2023       LIPID PANEL -       RENAL FUNCTION PANEL -   Lab Results   Component Value Date    GLUCOSE 100 (H) 09/14/2023     09/14/2023    K 4.3 09/14/2023     09/14/2023    CO2 28 09/14/2023    ANIONGAP 9 (L) 09/14/2023    BUN 22 09/14/2023    CREATININE 1.18 09/14/2023    GFRMALE 63 09/14/2023    CALCIUM 9.1 09/14/2023    PHOS 3.2 05/24/2023     ALBUMIN 3.8 2023        Lab Results   Component Value Date     (H) 2023    HGBA1C 5.8 (A) 2023       Last Cardiology Tests:  EC2023  Sinus rhythm with PACs, incomplete RBBB.     Echo: JALEN 2023  1. Left ventricular systolic function is severely decreased with a 20-25% estimated ejection fraction.  2. Successful cardioversion for atrial fibrillation resulting in sinus bradycardia.  3. There is mildly reduced right ventricular systolic function.  4. The left atrium is mild to moderately dilated.  5. Mild to moderate tricuspid regurgitation visualized.  6. No left atrial mass.  7. No left atrial thrombus.  8. There is global hypokinesis of the left ventricle with minor regional variations.  9. There is plaque visualized in the transverse aorta.    Cath: 2023  1. Stable coronary artery disease since  with a moderate LAD and first diagonal artery stenosis.  2. Mild circumflex and RCA disease.  3. Elevated right and left heart filling pressures with preserved cardiac output and index.    Stress Test: 3/28/2023  1. Normal regadenoson Myoview perfusion stress test.  2. Severely dilated left ventricular cavity on both sets of  tomographic images.  3. Severe left ventricular dysfunction with an ejection fraction of  18 %.    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     Note: A negative amyloid SPECT heart study does not rule out other  forms of possible cardiac amyloid deposition such as amyloid light  chain deposition.    Cardiac MRI 2022  1.5T Cardiac MR to assess structure and function.      1. Constellation of findings are highly suggestive of cardiac   amyloidosis.   2. severe biventricular dysfunction. LVEF 23%;LVEDV 121ml/m\S\2. RVEF   28%; RVEDV 135ml/m\S\2.   3.Small to moderate size circumferential pericardial effusion.  4. Moderate size pleural effusion.      Assessment/Plan   Problem #1. Heart failure with  reduced action fraction, and cardiac amyloidosis. He is NYHA class III, clinically appears to be warm and slightly wet. He will continue his guideline directed medical therapy, with Entresto, Farxiga, metoprolol.  As before, we have instructed him to take additional torsemide if his weight goes above 80 pounds, or if he has leg swelling. If this does not work, he is to call us.  Problem #2. ICD shock. We will defer management to electrophysiology, but for now he will continue continue on amiodarone 200 mg p.o. daily.  We will check amiodarone related labs.  Problem #3. Atrial fibrillation.  His EKG today shows his rate is reasonably well controlled, and no changes are to be recommended at this time. He will continue on Eliquis 5 mg p.o. twice daily. He is at somewhat of a fall risk, and I have asked him to be cognizant. We will have a low threshold to consider him for a Watchman device.  Problem #4. Pericardial effusion. This was not impressive on his most recent transesophageal echocardiogram. We will continue to monitor, and do a repeat echo in 6 months time.  Problem #5. High risk medications. He remains on amiodarone. His most recent LFTs etc. are acceptable.  We will do problem amiodarone related labs.  #6. Hernia repair. Understand that he wishes to have his hernia repaired. I feel that with care, we would be able to get him through this procedure. This would include continuing his cardiovascular medications, close monitoring by advanced anesthesia during the procedure, avoidance of unnecessary IV fluids, and observation for 24 hours postoperatively. Our service may be consulted at any time.  He may hold his Eliquis for 48 hours preoperatively, and this is to be resumed as soon as possible surgically.  We will see him back in his clinic in 6 months time. He is to call us with any questions or concerns.       Jose L Gonzalez MD

## 2024-01-08 ENCOUNTER — OFFICE VISIT (OUTPATIENT)
Dept: CARDIOLOGY | Facility: CLINIC | Age: 79
End: 2024-01-08
Payer: MEDICARE

## 2024-01-08 VITALS
HEART RATE: 99 BPM | HEIGHT: 67 IN | BODY MASS INDEX: 31.77 KG/M2 | DIASTOLIC BLOOD PRESSURE: 72 MMHG | WEIGHT: 202.4 LBS | OXYGEN SATURATION: 96 % | SYSTOLIC BLOOD PRESSURE: 128 MMHG

## 2024-01-08 DIAGNOSIS — I50.22 CHRONIC SYSTOLIC (CONGESTIVE) HEART FAILURE (MULTI): ICD-10-CM

## 2024-01-08 DIAGNOSIS — I43 CARDIAC AMYLOIDOSIS (MULTI): Primary | ICD-10-CM

## 2024-01-08 DIAGNOSIS — Z79.899 ON AMIODARONE THERAPY: ICD-10-CM

## 2024-01-08 DIAGNOSIS — E85.4 CARDIAC AMYLOIDOSIS (MULTI): Primary | ICD-10-CM

## 2024-01-08 DIAGNOSIS — I48.0 PAROXYSMAL ATRIAL FIBRILLATION (MULTI): ICD-10-CM

## 2024-01-08 PROCEDURE — 93000 ELECTROCARDIOGRAM COMPLETE: CPT | Performed by: SPECIALIST

## 2024-01-08 PROCEDURE — 3074F SYST BP LT 130 MM HG: CPT | Performed by: SPECIALIST

## 2024-01-08 PROCEDURE — 3078F DIAST BP <80 MM HG: CPT | Performed by: SPECIALIST

## 2024-01-08 PROCEDURE — 1159F MED LIST DOCD IN RCRD: CPT | Performed by: SPECIALIST

## 2024-01-08 PROCEDURE — 1160F RVW MEDS BY RX/DR IN RCRD: CPT | Performed by: SPECIALIST

## 2024-01-08 PROCEDURE — 1126F AMNT PAIN NOTED NONE PRSNT: CPT | Performed by: SPECIALIST

## 2024-01-08 PROCEDURE — 99214 OFFICE O/P EST MOD 30 MIN: CPT | Performed by: SPECIALIST

## 2024-01-08 RX ORDER — TORSEMIDE 20 MG/1
20 TABLET ORAL DAILY
Qty: 90 TABLET | Refills: 3 | Status: SHIPPED | OUTPATIENT
Start: 2024-01-08 | End: 2024-03-07 | Stop reason: HOSPADM

## 2024-01-08 RX ORDER — DAPAGLIFLOZIN 10 MG/1
10 TABLET, FILM COATED ORAL DAILY
Qty: 90 TABLET | Refills: 3 | Status: SHIPPED | OUTPATIENT
Start: 2024-01-08 | End: 2025-01-07

## 2024-01-08 NOTE — PATIENT INSTRUCTIONS
1.  No changes in medications.  2.  Get fasting blood work sometime in the next month.  This needs to be after an overnight fast.  3.  I have refilled your medications.  Yana Reyes will call you regarding the Vyndamax.  4.  Get an echo before you come back to see me in 6 months time.  5.  If in the meantime you feel worse in any way call our office at [phone].

## 2024-01-11 ENCOUNTER — HOSPITAL ENCOUNTER (OUTPATIENT)
Dept: CARDIOLOGY | Facility: CLINIC | Age: 79
Discharge: HOME | End: 2024-01-11
Payer: MEDICARE

## 2024-01-11 DIAGNOSIS — Z95.0 CARDIAC PACEMAKER IN SITU: ICD-10-CM

## 2024-01-11 DIAGNOSIS — I49.5 SICK SINUS SYNDROME (MULTI): ICD-10-CM

## 2024-01-11 PROCEDURE — 93296 REM INTERROG EVL PM/IDS: CPT

## 2024-01-11 PROCEDURE — 93295 DEV INTERROG REMOTE 1/2/MLT: CPT | Performed by: INTERNAL MEDICINE

## 2024-03-03 ENCOUNTER — APPOINTMENT (OUTPATIENT)
Dept: RADIOLOGY | Facility: HOSPITAL | Age: 79
DRG: 375 | End: 2024-03-03
Payer: MEDICARE

## 2024-03-03 ENCOUNTER — HOSPITAL ENCOUNTER (INPATIENT)
Facility: HOSPITAL | Age: 79
LOS: 4 days | Discharge: HOME | DRG: 375 | End: 2024-03-07
Attending: EMERGENCY MEDICINE | Admitting: INTERNAL MEDICINE
Payer: MEDICARE

## 2024-03-03 ENCOUNTER — APPOINTMENT (OUTPATIENT)
Dept: CARDIOLOGY | Facility: HOSPITAL | Age: 79
DRG: 375 | End: 2024-03-03
Payer: MEDICARE

## 2024-03-03 DIAGNOSIS — K40.21 BILATERAL RECURRENT INGUINAL HERNIA WITHOUT OBSTRUCTION OR GANGRENE: ICD-10-CM

## 2024-03-03 DIAGNOSIS — I48.0 PAROXYSMAL ATRIAL FIBRILLATION (MULTI): ICD-10-CM

## 2024-03-03 DIAGNOSIS — R07.9 CHEST PAIN, UNSPECIFIED TYPE: ICD-10-CM

## 2024-03-03 DIAGNOSIS — C79.9 METASTATIC MALIGNANT NEOPLASM, UNSPECIFIED SITE (MULTI): Primary | ICD-10-CM

## 2024-03-03 DIAGNOSIS — R93.3 ABNORMAL CT SCAN, COLON: ICD-10-CM

## 2024-03-03 LAB
ALBUMIN SERPL BCP-MCNC: 3.6 G/DL (ref 3.4–5)
ALP SERPL-CCNC: 308 U/L (ref 33–136)
ALT SERPL W P-5'-P-CCNC: 29 U/L (ref 10–52)
ANION GAP SERPL CALC-SCNC: 15 MMOL/L (ref 10–20)
APPEARANCE UR: ABNORMAL
AST SERPL W P-5'-P-CCNC: 33 U/L (ref 9–39)
BACTERIA #/AREA URNS AUTO: ABNORMAL /HPF
BASOPHILS # BLD AUTO: 0.02 X10*3/UL (ref 0–0.1)
BASOPHILS NFR BLD AUTO: 0.3 %
BILIRUB SERPL-MCNC: 1 MG/DL (ref 0–1.2)
BILIRUB UR STRIP.AUTO-MCNC: NEGATIVE MG/DL
BNP SERPL-MCNC: 529 PG/ML (ref 0–99)
BUN SERPL-MCNC: 25 MG/DL (ref 6–23)
CALCIUM SERPL-MCNC: 9 MG/DL (ref 8.6–10.3)
CARDIAC TROPONIN I PNL SERPL HS: 62 NG/L (ref 0–20)
CARDIAC TROPONIN I PNL SERPL HS: 65 NG/L (ref 0–20)
CHLORIDE SERPL-SCNC: 103 MMOL/L (ref 98–107)
CO2 SERPL-SCNC: 24 MMOL/L (ref 21–32)
COLOR UR: ABNORMAL
CREAT SERPL-MCNC: 1.25 MG/DL (ref 0.5–1.3)
EGFRCR SERPLBLD CKD-EPI 2021: 59 ML/MIN/1.73M*2
EOSINOPHIL # BLD AUTO: 0.29 X10*3/UL (ref 0–0.4)
EOSINOPHIL NFR BLD AUTO: 4 %
ERYTHROCYTE [DISTWIDTH] IN BLOOD BY AUTOMATED COUNT: 16.1 % (ref 11.5–14.5)
GLUCOSE SERPL-MCNC: 101 MG/DL (ref 74–99)
GLUCOSE UR STRIP.AUTO-MCNC: NEGATIVE MG/DL
HCT VFR BLD AUTO: 31.8 % (ref 41–52)
HGB BLD-MCNC: 9.7 G/DL (ref 13.5–17.5)
HYALINE CASTS #/AREA URNS AUTO: ABNORMAL /LPF
IMM GRANULOCYTES # BLD AUTO: 0.04 X10*3/UL (ref 0–0.5)
IMM GRANULOCYTES NFR BLD AUTO: 0.5 % (ref 0–0.9)
INR PPP: 1.5 (ref 0.9–1.1)
KETONES UR STRIP.AUTO-MCNC: NEGATIVE MG/DL
LACTATE SERPL-SCNC: 1.5 MMOL/L (ref 0.4–2)
LEUKOCYTE ESTERASE UR QL STRIP.AUTO: NEGATIVE
LYMPHOCYTES # BLD AUTO: 0.74 X10*3/UL (ref 0.8–3)
LYMPHOCYTES NFR BLD AUTO: 10.2 %
MAGNESIUM SERPL-MCNC: 1.79 MG/DL (ref 1.6–2.4)
MCH RBC QN AUTO: 25.2 PG (ref 26–34)
MCHC RBC AUTO-ENTMCNC: 30.5 G/DL (ref 32–36)
MCV RBC AUTO: 83 FL (ref 80–100)
MONOCYTES # BLD AUTO: 0.39 X10*3/UL (ref 0.05–0.8)
MONOCYTES NFR BLD AUTO: 5.4 %
MUCOUS THREADS #/AREA URNS AUTO: ABNORMAL /LPF
NEUTROPHILS # BLD AUTO: 5.8 X10*3/UL (ref 1.6–5.5)
NEUTROPHILS NFR BLD AUTO: 79.6 %
NITRITE UR QL STRIP.AUTO: NEGATIVE
NRBC BLD-RTO: 0 /100 WBCS (ref 0–0)
PH UR STRIP.AUTO: 5 [PH]
PLATELET # BLD AUTO: 293 X10*3/UL (ref 150–450)
POTASSIUM SERPL-SCNC: 4 MMOL/L (ref 3.5–5.3)
PROT SERPL-MCNC: 6.8 G/DL (ref 6.4–8.2)
PROT UR STRIP.AUTO-MCNC: ABNORMAL MG/DL
PROTHROMBIN TIME: 17.1 SECONDS (ref 9.8–12.8)
RBC # BLD AUTO: 3.85 X10*6/UL (ref 4.5–5.9)
RBC # UR STRIP.AUTO: NEGATIVE /UL
RBC #/AREA URNS AUTO: ABNORMAL /HPF
SODIUM SERPL-SCNC: 138 MMOL/L (ref 136–145)
SP GR UR STRIP.AUTO: 1.02
SQUAMOUS #/AREA URNS AUTO: ABNORMAL /HPF
UROBILINOGEN UR STRIP.AUTO-MCNC: 4 MG/DL
WBC # BLD AUTO: 7.3 X10*3/UL (ref 4.4–11.3)
WBC #/AREA URNS AUTO: ABNORMAL /HPF
WBC CLUMPS #/AREA URNS AUTO: ABNORMAL /HPF

## 2024-03-03 PROCEDURE — 2500000001 HC RX 250 WO HCPCS SELF ADMINISTERED DRUGS (ALT 637 FOR MEDICARE OP): Performed by: INTERNAL MEDICINE

## 2024-03-03 PROCEDURE — 83735 ASSAY OF MAGNESIUM: CPT | Performed by: INTERNAL MEDICINE

## 2024-03-03 PROCEDURE — 93005 ELECTROCARDIOGRAM TRACING: CPT

## 2024-03-03 PROCEDURE — 36415 COLL VENOUS BLD VENIPUNCTURE: CPT | Performed by: EMERGENCY MEDICINE

## 2024-03-03 PROCEDURE — 96375 TX/PRO/DX INJ NEW DRUG ADDON: CPT

## 2024-03-03 PROCEDURE — 83605 ASSAY OF LACTIC ACID: CPT | Performed by: EMERGENCY MEDICINE

## 2024-03-03 PROCEDURE — 80053 COMPREHEN METABOLIC PANEL: CPT | Performed by: EMERGENCY MEDICINE

## 2024-03-03 PROCEDURE — 2500000004 HC RX 250 GENERAL PHARMACY W/ HCPCS (ALT 636 FOR OP/ED): Performed by: EMERGENCY MEDICINE

## 2024-03-03 PROCEDURE — 87086 URINE CULTURE/COLONY COUNT: CPT | Mod: PARLAB | Performed by: EMERGENCY MEDICINE

## 2024-03-03 PROCEDURE — 83880 ASSAY OF NATRIURETIC PEPTIDE: CPT | Performed by: EMERGENCY MEDICINE

## 2024-03-03 PROCEDURE — 96374 THER/PROPH/DIAG INJ IV PUSH: CPT

## 2024-03-03 PROCEDURE — 99285 EMERGENCY DEPT VISIT HI MDM: CPT | Mod: 25

## 2024-03-03 PROCEDURE — 82378 CARCINOEMBRYONIC ANTIGEN: CPT | Mod: PARLAB | Performed by: INTERNAL MEDICINE

## 2024-03-03 PROCEDURE — 81001 URINALYSIS AUTO W/SCOPE: CPT | Performed by: EMERGENCY MEDICINE

## 2024-03-03 PROCEDURE — 76870 US EXAM SCROTUM: CPT

## 2024-03-03 PROCEDURE — 99223 1ST HOSP IP/OBS HIGH 75: CPT | Performed by: INTERNAL MEDICINE

## 2024-03-03 PROCEDURE — 2550000001 HC RX 255 CONTRASTS: Performed by: EMERGENCY MEDICINE

## 2024-03-03 PROCEDURE — 86301 IMMUNOASSAY TUMOR CA 19-9: CPT | Mod: PARLAB | Performed by: INTERNAL MEDICINE

## 2024-03-03 PROCEDURE — 74177 CT ABD & PELVIS W/CONTRAST: CPT

## 2024-03-03 PROCEDURE — 71046 X-RAY EXAM CHEST 2 VIEWS: CPT

## 2024-03-03 PROCEDURE — 1200000002 HC GENERAL ROOM WITH TELEMETRY DAILY

## 2024-03-03 PROCEDURE — 71046 X-RAY EXAM CHEST 2 VIEWS: CPT | Performed by: STUDENT IN AN ORGANIZED HEALTH CARE EDUCATION/TRAINING PROGRAM

## 2024-03-03 PROCEDURE — 84484 ASSAY OF TROPONIN QUANT: CPT | Performed by: EMERGENCY MEDICINE

## 2024-03-03 PROCEDURE — 85610 PROTHROMBIN TIME: CPT | Performed by: EMERGENCY MEDICINE

## 2024-03-03 PROCEDURE — 85025 COMPLETE CBC W/AUTO DIFF WBC: CPT | Performed by: EMERGENCY MEDICINE

## 2024-03-03 PROCEDURE — 82105 ALPHA-FETOPROTEIN SERUM: CPT | Mod: PARLAB | Performed by: INTERNAL MEDICINE

## 2024-03-03 PROCEDURE — 2500000002 HC RX 250 W HCPCS SELF ADMINISTERED DRUGS (ALT 637 FOR MEDICARE OP, ALT 636 FOR OP/ED): Mod: MUE | Performed by: INTERNAL MEDICINE

## 2024-03-03 RX ORDER — TORSEMIDE 20 MG/1
20 TABLET ORAL DAILY
Status: DISCONTINUED | OUTPATIENT
Start: 2024-03-04 | End: 2024-03-03

## 2024-03-03 RX ORDER — DAPAGLIFLOZIN 10 MG/1
10 TABLET, FILM COATED ORAL DAILY
Status: DISCONTINUED | OUTPATIENT
Start: 2024-03-04 | End: 2024-03-07 | Stop reason: HOSPADM

## 2024-03-03 RX ORDER — ONDANSETRON HYDROCHLORIDE 2 MG/ML
4 INJECTION, SOLUTION INTRAVENOUS ONCE
Status: COMPLETED | OUTPATIENT
Start: 2024-03-03 | End: 2024-03-03

## 2024-03-03 RX ORDER — LANOLIN ALCOHOL/MO/W.PET/CERES
400 CREAM (GRAM) TOPICAL DAILY
Status: DISCONTINUED | OUTPATIENT
Start: 2024-03-04 | End: 2024-03-07 | Stop reason: HOSPADM

## 2024-03-03 RX ORDER — ONDANSETRON HYDROCHLORIDE 2 MG/ML
4 INJECTION, SOLUTION INTRAVENOUS EVERY 8 HOURS PRN
Status: DISCONTINUED | OUTPATIENT
Start: 2024-03-03 | End: 2024-03-07 | Stop reason: HOSPADM

## 2024-03-03 RX ORDER — AMIODARONE HYDROCHLORIDE 200 MG/1
200 TABLET ORAL DAILY
Status: DISCONTINUED | OUTPATIENT
Start: 2024-03-04 | End: 2024-03-07 | Stop reason: HOSPADM

## 2024-03-03 RX ORDER — OXYBUTYNIN CHLORIDE 5 MG/1
5 TABLET ORAL 2 TIMES DAILY
Status: DISCONTINUED | OUTPATIENT
Start: 2024-03-03 | End: 2024-03-07 | Stop reason: HOSPADM

## 2024-03-03 RX ORDER — NITROGLYCERIN 0.4 MG/1
0.4 TABLET SUBLINGUAL EVERY 5 MIN PRN
Status: DISCONTINUED | OUTPATIENT
Start: 2024-03-03 | End: 2024-03-07 | Stop reason: HOSPADM

## 2024-03-03 RX ORDER — POLYETHYLENE GLYCOL 3350 17 G/17G
17 POWDER, FOR SOLUTION ORAL DAILY PRN
Status: DISCONTINUED | OUTPATIENT
Start: 2024-03-03 | End: 2024-03-07 | Stop reason: HOSPADM

## 2024-03-03 RX ORDER — KETOROLAC TROMETHAMINE 30 MG/ML
15 INJECTION, SOLUTION INTRAMUSCULAR; INTRAVENOUS ONCE
Status: COMPLETED | OUTPATIENT
Start: 2024-03-03 | End: 2024-03-03

## 2024-03-03 RX ORDER — TALC
3 POWDER (GRAM) TOPICAL DAILY
Status: DISCONTINUED | OUTPATIENT
Start: 2024-03-03 | End: 2024-03-05

## 2024-03-03 RX ORDER — OXYCODONE HYDROCHLORIDE 5 MG/1
5 TABLET ORAL EVERY 6 HOURS PRN
Status: DISCONTINUED | OUTPATIENT
Start: 2024-03-03 | End: 2024-03-07 | Stop reason: HOSPADM

## 2024-03-03 RX ORDER — METOPROLOL SUCCINATE 50 MG/1
50 TABLET, EXTENDED RELEASE ORAL DAILY
Status: DISCONTINUED | OUTPATIENT
Start: 2024-03-04 | End: 2024-03-07 | Stop reason: HOSPADM

## 2024-03-03 RX ORDER — POLYETHYLENE GLYCOL 3350 17 G/17G
17 POWDER, FOR SOLUTION ORAL DAILY
Status: DISCONTINUED | OUTPATIENT
Start: 2024-03-04 | End: 2024-03-05

## 2024-03-03 RX ORDER — OXYCODONE AND ACETAMINOPHEN 5; 325 MG/1; MG/1
2 TABLET ORAL EVERY 8 HOURS PRN
Status: DISCONTINUED | OUTPATIENT
Start: 2024-03-03 | End: 2024-03-07 | Stop reason: HOSPADM

## 2024-03-03 RX ORDER — ACETAMINOPHEN 325 MG/1
650 TABLET ORAL EVERY 4 HOURS PRN
Status: DISCONTINUED | OUTPATIENT
Start: 2024-03-03 | End: 2024-03-07 | Stop reason: HOSPADM

## 2024-03-03 RX ADMIN — KETOROLAC TROMETHAMINE 15 MG: 30 INJECTION, SOLUTION INTRAMUSCULAR; INTRAVENOUS at 18:29

## 2024-03-03 RX ADMIN — IOHEXOL 75 ML: 350 INJECTION, SOLUTION INTRAVENOUS at 18:56

## 2024-03-03 RX ADMIN — Medication 3 MG: at 23:08

## 2024-03-03 RX ADMIN — OXYBUTYNIN CHLORIDE 5 MG: 5 TABLET ORAL at 23:08

## 2024-03-03 RX ADMIN — ONDANSETRON 4 MG: 2 INJECTION INTRAMUSCULAR; INTRAVENOUS at 18:26

## 2024-03-03 RX ADMIN — APIXABAN 5 MG: 5 TABLET, FILM COATED ORAL at 23:07

## 2024-03-03 RX ADMIN — SACUBITRIL AND VALSARTAN 1 TABLET: 24; 26 TABLET, FILM COATED ORAL at 23:09

## 2024-03-03 ASSESSMENT — ENCOUNTER SYMPTOMS
FATIGUE: 1
NECK PAIN: 0
WOUND: 0
FLANK PAIN: 0
EYE REDNESS: 0
JOINT SWELLING: 0
CONSTIPATION: 0
WHEEZING: 0
ABDOMINAL PAIN: 1
HALLUCINATIONS: 0
DYSURIA: 0
DIZZINESS: 1
HEMATURIA: 0
SHORTNESS OF BREATH: 0
FACIAL SWELLING: 0
CHILLS: 0
APPETITE CHANGE: 1
VOMITING: 0
DIFFICULTY URINATING: 0
FEVER: 0
HEADACHES: 0
PALPITATIONS: 0
BACK PAIN: 0
FREQUENCY: 0
CONFUSION: 0
COUGH: 0
EYE PAIN: 0
NAUSEA: 0
DIARRHEA: 0

## 2024-03-03 ASSESSMENT — PAIN - FUNCTIONAL ASSESSMENT
PAIN_FUNCTIONAL_ASSESSMENT: 0-10
PAIN_FUNCTIONAL_ASSESSMENT: 0-10

## 2024-03-03 ASSESSMENT — PAIN SCALES - GENERAL
PAINLEVEL_OUTOF10: 8

## 2024-03-03 ASSESSMENT — LIFESTYLE VARIABLES
EVER HAD A DRINK FIRST THING IN THE MORNING TO STEADY YOUR NERVES TO GET RID OF A HANGOVER: NO
HAVE PEOPLE ANNOYED YOU BY CRITICIZING YOUR DRINKING: NO
HAVE YOU EVER FELT YOU SHOULD CUT DOWN ON YOUR DRINKING: NO
EVER FELT BAD OR GUILTY ABOUT YOUR DRINKING: NO

## 2024-03-03 ASSESSMENT — PAIN DESCRIPTION - LOCATION
LOCATION: ABDOMEN
LOCATION: ABDOMEN

## 2024-03-03 ASSESSMENT — PAIN DESCRIPTION - PAIN TYPE: TYPE: ACUTE PAIN

## 2024-03-03 ASSESSMENT — COLUMBIA-SUICIDE SEVERITY RATING SCALE - C-SSRS
2. HAVE YOU ACTUALLY HAD ANY THOUGHTS OF KILLING YOURSELF?: NO
1. IN THE PAST MONTH, HAVE YOU WISHED YOU WERE DEAD OR WISHED YOU COULD GO TO SLEEP AND NOT WAKE UP?: NO
6. HAVE YOU EVER DONE ANYTHING, STARTED TO DO ANYTHING, OR PREPARED TO DO ANYTHING TO END YOUR LIFE?: NO

## 2024-03-03 ASSESSMENT — PAIN DESCRIPTION - DESCRIPTORS
DESCRIPTORS: ACHING
DESCRIPTORS: ACHING

## 2024-03-03 ASSESSMENT — PAIN DESCRIPTION - DIRECTION: RADIATING_TOWARDS: GROIN

## 2024-03-03 ASSESSMENT — PAIN DESCRIPTION - FREQUENCY: FREQUENCY: CONSTANT/CONTINUOUS

## 2024-03-03 ASSESSMENT — PAIN DESCRIPTION - ORIENTATION: ORIENTATION: RIGHT

## 2024-03-03 ASSESSMENT — PAIN DESCRIPTION - ONSET: ONSET: ONGOING

## 2024-03-03 NOTE — ED PROVIDER NOTES
HPI   Chief Complaint   Patient presents with    Shortness of Breath       This is a 78-year-old man who presents with complaints of chest and abdominal pain.  His primary complaint is severe lower abdominal pain and scrotal pain.  He states this began earlier in the day and has been progressive.  He states the pain does have waves of more severe pain, but is constant as well.  He describes it as aching in his right lower quadrant and radiating to his testicles, right greater than left.  He does have a history of known hernias in his scrotum, and states that he will sometimes have some pain, but today the pain has not gone away.  He denies any significant change in his baseline swelling in his scrotum.  No difficulty with urination.  Has not had a bowel movement today, but is passing flatus.  He additionally has been having midsternal to left-sided chest pain all day.  He describes this as a tightness, more than pain.  This has improved.  It is not associated with exertion.  No associated shortness of breath.  He has had some lightheadedness, worse with standing.  No fevers or chills.  Nausea without vomiting.  No additional complaints.                          Jules Coma Scale Score: 15                     Patient History   Past Medical History:   Diagnosis Date    Other specified health status     No pertinent past medical history     Past Surgical History:   Procedure Laterality Date    KNEE SURGERY  06/21/2021    Knee Surgery    OTHER SURGICAL HISTORY  06/21/2021    Cardiac catheterization    TOTAL HIP ARTHROPLASTY  06/21/2021    Total Hip Replacement     Family History   Problem Relation Name Age of Onset    Other (cardiac disorder) Mother      Coronary artery disease Mother      Other (cardiac disorder) Father      Coronary artery disease Father      Hypertension Father      Heart attack Father       Social History     Tobacco Use    Smoking status: Never    Smokeless tobacco: Never   Substance Use Topics     Alcohol use: Not on file    Drug use: Not on file       Physical Exam   ED Triage Vitals   Temperature Heart Rate Respirations BP   03/03/24 1647 03/03/24 1645 03/03/24 1645 03/03/24 1647   37.7 °C (99.9 °F) 96 18 100/64      Pulse Ox Temp Source Heart Rate Source Patient Position   03/03/24 1645 03/03/24 1647 -- 03/03/24 1647   97 % Temporal  Lying      BP Location FiO2 (%)     03/03/24 1647 --     Right arm        Physical Exam  Constitutional:       General: He is not in acute distress.  HENT:      Head: Normocephalic and atraumatic.      Right Ear: External ear normal.      Left Ear: External ear normal.      Nose: Nose normal.      Mouth/Throat:      Mouth: Mucous membranes are moist.      Pharynx: Oropharynx is clear.   Eyes:      Extraocular Movements: Extraocular movements intact.      Pupils: Pupils are equal, round, and reactive to light.   Cardiovascular:      Rate and Rhythm: Normal rate and regular rhythm.   Pulmonary:      Effort: Pulmonary effort is normal. No respiratory distress.   Abdominal:      General: There is no distension.      Palpations: Abdomen is soft.      Comments: RLQ tenderness to palpation. No rebound or guarding.   Genitourinary:     Comments: Bilateral inguinal swelling. No overlying skin changes. No tenderness to palpation. Bilateral scrotal swelling. No overlying skin changes. Soft and nontender to palpation bilaterally.  Musculoskeletal:         General: No swelling or deformity.      Cervical back: Normal range of motion and neck supple.   Skin:     General: Skin is warm and dry.   Neurological:      General: No focal deficit present.      Mental Status: He is alert and oriented to person, place, and time.   Psychiatric:         Mood and Affect: Mood normal.         Behavior: Behavior normal.       Labs Reviewed   CBC WITH AUTO DIFFERENTIAL - Abnormal       Result Value    WBC 7.3      nRBC 0.0      RBC 3.85 (*)     Hemoglobin 9.7 (*)     Hematocrit 31.8 (*)     MCV 83      MCH  25.2 (*)     MCHC 30.5 (*)     RDW 16.1 (*)     Platelets 293      Neutrophils % 79.6      Immature Granulocytes %, Automated 0.5      Lymphocytes % 10.2      Monocytes % 5.4      Eosinophils % 4.0      Basophils % 0.3      Neutrophils Absolute 5.80 (*)     Immature Granulocytes Absolute, Automated 0.04      Lymphocytes Absolute 0.74 (*)     Monocytes Absolute 0.39      Eosinophils Absolute 0.29      Basophils Absolute 0.02     COMPREHENSIVE METABOLIC PANEL - Abnormal    Glucose 101 (*)     Sodium 138      Potassium 4.0      Chloride 103      Bicarbonate 24      Anion Gap 15      Urea Nitrogen 25 (*)     Creatinine 1.25      eGFR 59 (*)     Calcium 9.0      Albumin 3.6      Alkaline Phosphatase 308 (*)     Total Protein 6.8      AST 33      Bilirubin, Total 1.0      ALT 29     PROTIME-INR - Abnormal    Protime 17.1 (*)     INR 1.5 (*)    B-TYPE NATRIURETIC PEPTIDE - Abnormal     (*)     Narrative:        <100 pg/mL - Heart failure unlikely  100-299 pg/mL - Intermediate probability of acute heart                  failure exacerbation. Correlate with clinical                  context and patient history.    >=300 pg/mL - Heart Failure likely. Correlate with clinical                  context and patient history.    BNP testing is performed using different testing methodology at [facility] than at other [facility]. Direct result comparisons should only be made within the same method.      TROPONIN I, HIGH SENSITIVITY - Abnormal    Troponin I, High Sensitivity 62 (*)     Narrative:     Less than 99th percentile of normal range cutoff-  Female and children under 18 years old <14 ng/L; Male <21 ng/L: Negative  Repeat testing should be performed if clinically indicated.     Female and children under 18 years old 14-50 ng/L; Male 21-50 ng/L:  Consistent with possible cardiac damage and possible increased clinical   risk. Serial measurements may help to assess extent of myocardial damage.     >50  ng/L: Consistent with cardiac damage, increased clinical risk and  myocardial infarction. Serial measurements may help assess extent of   myocardial damage.      NOTE: Children less than 1 year old may have higher baseline troponin   levels and results should be interpreted in conjunction with the overall   clinical context.     NOTE: Troponin I testing is performed using a different   testing methodology at [facility] than at other   [facility]. Direct result comparisons should only   be made within the same method.   URINALYSIS WITH REFLEX CULTURE AND MICROSCOPIC - Abnormal    Color, Urine Amanda (*)     Appearance, Urine Hazy (*)     Specific Gravity, Urine 1.019      pH, Urine 5.0      Protein, Urine 30 (1+) (*)     Glucose, Urine NEGATIVE      Blood, Urine NEGATIVE      Ketones, Urine NEGATIVE      Bilirubin, Urine NEGATIVE      Urobilinogen, Urine 4.0 (*)     Nitrite, Urine NEGATIVE      Leukocyte Esterase, Urine NEGATIVE     URINALYSIS MICROSCOPIC WITH REFLEX CULTURE - Abnormal    WBC, Urine 6-10 (*)     WBC Clumps, Urine OCCASIONAL      RBC, Urine 1-2      Squamous Epithelial Cells, Urine 1-9 (SPARSE)      Bacteria, Urine 1+ (*)     Mucus, Urine 1+      Hyaline Casts, Urine 4+ (*)    LACTATE - Normal    Lactate 1.5      Narrative:     Venipuncture immediately after or during the administration of Metamizole may lead to falsely low results. Testing should be performed immediately  prior to Metamizole dosing.   URINE CULTURE   URINALYSIS WITH REFLEX CULTURE AND MICROSCOPIC    Narrative:     The following orders were created for panel order Urinalysis with Reflex Culture and Microscopic.  Procedure                               Abnormality         Status                     ---------                               -----------         ------                     Urinalysis with Reflex C...[[id number]]  Abnormal            Final result               Extra Urine Gray Tube[[id number]]                             In process                   Please view results for these tests on the individual orders.   EXTRA URINE GRAY TUBE   TROPONIN I, HIGH SENSITIVITY   MAGNESIUM     CT abdomen pelvis w IV contrast   Final Result   Findings compatible with widespread metastatic disease including in   the lungs  with numerous hepatic metastatic disease also   retroperitoneal and retrocrural adenopathy is seen. Left upper   quadrant soft tissue mass seen between the stomach in the liver.   Findings favor metastatic colon carcinoma given the appearance.   Correlation with any known colonoscopy results advised. Robust   vascular calcification.        Large bilateral fat and bowel containing inguinal hernias        MACRO:   None        Signed by: Oleg Pineda 3/3/2024 7:22 PM   Dictation workstation:   [id number]      US scrotum w doppler   Final Result   Large bilateral inguinal hernias containing fat and bowel.        No evidence of torsion or inflammation. Mild bilateral heterogeneity   of the epididymal structures        MACRO:   None        Signed by: Oleg Pineda 3/3/2024 7:26 PM   Dictation workstation:   [id number]      XR chest 2 views   Final Result   Somewhat low lung volumes in the lungs bilaterally with bibasilar   atelectasis, without evidence of new consolidation or sizable pleural   effusion, although there are new nodular opacities is present in the   right lung, incompletely characterized on current exam. Consider   dedicated CT of the chest for further evaluation.        MACRO:   None        Signed by: Kaleb Wen 3/3/2024 6:31 PM   Dictation workstation:   [id number]      US scrotum    (Results Pending)   US scrotum    (Results Pending)       ED Course & MDM   Diagnoses as of 03/03/24 2015   Metastatic malignant neoplasm, unspecified site (CMS/HCC)   Chest pain, unspecified type   Bilateral recurrent inguinal hernia without obstruction or gangrene       Medical Decision Making  Patient  presented as above.  He is awake, alert, in no acute distress.  He has noted to have PVCs on monitor.  Upon further questioning, concern is that his defibrillator fired during the night.  He does have right lower quadrant abdominal tenderness on exam and large bilateral inguinal hernias.  These are soft and do not appear to be incarcerated.  Broad workup was initiated as above.  His troponin is above normal range, however, lower than patient's most recent level.  Delta troponin will be sent to ensure this is stable and/or trending down.  BNP is also improved from previous.  Patient is not fluid overloaded on exam.  EKG does not show acute ischemic changes, but does show PVCs.  CT of the chest, abdomen, pelvis was obtained as well as scrotal ultrasound.  Ultrasound confirmed hernias without other significant findings.  CT is concerning for metastatic colon cancer with extensive disease in the abdomen and pelvis.  Results were communicated to the patient and family, as well as concern for possible cancer diagnosis.  Given new concern for malignancy as well as chest pain and defibrillator firing, patient will be admitted for telemetry, ongoing workup and management.  Patient and family are in agreement with this plan.  Case discussed with the hospitalist, who has accepted the patient for admission.  I feel he is appropriate for a telemetry floor bed and he will be transferred to the floor in stable condition when his bed is ready.    Amount and/or Complexity of Data Reviewed  Independent Historian: spouse  External Data Reviewed: radiology, ECG and notes.  Labs: ordered. Decision-making details documented in ED Course.  Radiology: ordered. Decision-making details documented in ED Course.  ECG/medicine tests: ordered and independent interpretation performed.     Details: Per my interpretation, EKG shows what appears to be underlying sinus rhythm at a rate of 89 bpm.  Multiple PVCs.  [place] is -87 degrees.  QRS is 108 ms,  QTc is 528 ms.  Abnormal EKG.        Procedure  Procedures     Hebert Pelletier MD  03/03/24 2019

## 2024-03-04 LAB
AFP SERPL-MCNC: <4 NG/ML (ref 0–9)
ALBUMIN SERPL BCP-MCNC: 3.4 G/DL (ref 3.4–5)
ANION GAP SERPL CALC-SCNC: 10 MMOL/L (ref 10–20)
BUN SERPL-MCNC: 35 MG/DL (ref 6–23)
CALCIUM SERPL-MCNC: 8.7 MG/DL (ref 8.6–10.3)
CANCER AG19-9 SERPL-ACNC: ABNORMAL U/ML
CEA SERPL-MCNC: 207.5 UG/L
CHLORIDE SERPL-SCNC: 106 MMOL/L (ref 98–107)
CO2 SERPL-SCNC: 27 MMOL/L (ref 21–32)
CREAT SERPL-MCNC: 1.32 MG/DL (ref 0.5–1.3)
EGFRCR SERPLBLD CKD-EPI 2021: 55 ML/MIN/1.73M*2
ERYTHROCYTE [DISTWIDTH] IN BLOOD BY AUTOMATED COUNT: 16.2 % (ref 11.5–14.5)
ERYTHROCYTE [DISTWIDTH] IN BLOOD BY AUTOMATED COUNT: 16.2 % (ref 11.5–14.5)
GLUCOSE SERPL-MCNC: 108 MG/DL (ref 74–99)
HCT VFR BLD AUTO: 29.9 % (ref 41–52)
HCT VFR BLD AUTO: 29.9 % (ref 41–52)
HGB BLD-MCNC: 9 G/DL (ref 13.5–17.5)
HGB BLD-MCNC: 9.1 G/DL (ref 13.5–17.5)
HOLD SPECIMEN: NORMAL
MAGNESIUM SERPL-MCNC: 1.95 MG/DL (ref 1.6–2.4)
MCH RBC QN AUTO: 25.1 PG (ref 26–34)
MCH RBC QN AUTO: 25.5 PG (ref 26–34)
MCHC RBC AUTO-ENTMCNC: 30.1 G/DL (ref 32–36)
MCHC RBC AUTO-ENTMCNC: 30.4 G/DL (ref 32–36)
MCV RBC AUTO: 83 FL (ref 80–100)
MCV RBC AUTO: 84 FL (ref 80–100)
NRBC BLD-RTO: 0 /100 WBCS (ref 0–0)
NRBC BLD-RTO: 0 /100 WBCS (ref 0–0)
PHOSPHATE SERPL-MCNC: 3.2 MG/DL (ref 2.5–4.9)
PLATELET # BLD AUTO: 255 X10*3/UL (ref 150–450)
PLATELET # BLD AUTO: 257 X10*3/UL (ref 150–450)
POTASSIUM SERPL-SCNC: 4.4 MMOL/L (ref 3.5–5.3)
RBC # BLD AUTO: 3.57 X10*6/UL (ref 4.5–5.9)
RBC # BLD AUTO: 3.59 X10*6/UL (ref 4.5–5.9)
SODIUM SERPL-SCNC: 139 MMOL/L (ref 136–145)
WBC # BLD AUTO: 8 X10*3/UL (ref 4.4–11.3)
WBC # BLD AUTO: 8.7 X10*3/UL (ref 4.4–11.3)

## 2024-03-04 PROCEDURE — 99223 1ST HOSP IP/OBS HIGH 75: CPT | Performed by: INTERNAL MEDICINE

## 2024-03-04 PROCEDURE — 1200000002 HC GENERAL ROOM WITH TELEMETRY DAILY

## 2024-03-04 PROCEDURE — 2500000004 HC RX 250 GENERAL PHARMACY W/ HCPCS (ALT 636 FOR OP/ED): Performed by: INTERNAL MEDICINE

## 2024-03-04 PROCEDURE — 83735 ASSAY OF MAGNESIUM: CPT

## 2024-03-04 PROCEDURE — 36415 COLL VENOUS BLD VENIPUNCTURE: CPT

## 2024-03-04 PROCEDURE — 2500000001 HC RX 250 WO HCPCS SELF ADMINISTERED DRUGS (ALT 637 FOR MEDICARE OP): Performed by: INTERNAL MEDICINE

## 2024-03-04 PROCEDURE — 99232 SBSQ HOSP IP/OBS MODERATE 35: CPT | Performed by: INTERNAL MEDICINE

## 2024-03-04 PROCEDURE — 99223 1ST HOSP IP/OBS HIGH 75: CPT | Performed by: PHYSICIAN ASSISTANT

## 2024-03-04 PROCEDURE — 4B02XTZ MEASUREMENT OF CARDIAC DEFIBRILLATOR, EXTERNAL APPROACH: ICD-10-PCS | Performed by: STUDENT IN AN ORGANIZED HEALTH CARE EDUCATION/TRAINING PROGRAM

## 2024-03-04 PROCEDURE — 2500000002 HC RX 250 W HCPCS SELF ADMINISTERED DRUGS (ALT 637 FOR MEDICARE OP, ALT 636 FOR OP/ED): Mod: MUE | Performed by: INTERNAL MEDICINE

## 2024-03-04 PROCEDURE — 85027 COMPLETE CBC AUTOMATED: CPT

## 2024-03-04 PROCEDURE — 80069 RENAL FUNCTION PANEL: CPT

## 2024-03-04 RX ORDER — PANTOPRAZOLE SODIUM 40 MG/10ML
40 INJECTION, POWDER, LYOPHILIZED, FOR SOLUTION INTRAVENOUS
Status: DISCONTINUED | OUTPATIENT
Start: 2024-03-05 | End: 2024-03-07 | Stop reason: HOSPADM

## 2024-03-04 RX ORDER — PANTOPRAZOLE SODIUM 40 MG/1
40 TABLET, DELAYED RELEASE ORAL
Status: DISCONTINUED | OUTPATIENT
Start: 2024-03-05 | End: 2024-03-07 | Stop reason: HOSPADM

## 2024-03-04 RX ADMIN — MAGNESIUM OXIDE TAB 400 MG (241.3 MG ELEMENTAL MG) 400 MG: 400 (241.3 MG) TAB at 09:06

## 2024-03-04 RX ADMIN — SODIUM CHLORIDE 250 ML: 9 INJECTION, SOLUTION INTRAVENOUS at 04:42

## 2024-03-04 RX ADMIN — OXYBUTYNIN CHLORIDE 5 MG: 5 TABLET ORAL at 09:07

## 2024-03-04 RX ADMIN — OXYBUTYNIN CHLORIDE 5 MG: 5 TABLET ORAL at 20:32

## 2024-03-04 RX ADMIN — DAPAGLIFLOZIN 10 MG: 10 TABLET, FILM COATED ORAL at 09:06

## 2024-03-04 RX ADMIN — METOPROLOL SUCCINATE 50 MG: 50 TABLET, EXTENDED RELEASE ORAL at 09:07

## 2024-03-04 RX ADMIN — AMIODARONE HYDROCHLORIDE 200 MG: 200 TABLET ORAL at 09:06

## 2024-03-04 SDOH — SOCIAL STABILITY: SOCIAL INSECURITY: HAS ANYONE EVER THREATENED TO HURT YOUR FAMILY OR YOUR PETS?: NO

## 2024-03-04 SDOH — SOCIAL STABILITY: SOCIAL INSECURITY: DOES ANYONE TRY TO KEEP YOU FROM HAVING/CONTACTING OTHER FRIENDS OR DOING THINGS OUTSIDE YOUR HOME?: NO

## 2024-03-04 SDOH — SOCIAL STABILITY: SOCIAL INSECURITY: HAVE YOU HAD THOUGHTS OF HARMING ANYONE ELSE?: NO

## 2024-03-04 SDOH — SOCIAL STABILITY: SOCIAL INSECURITY: ARE THERE ANY APPARENT SIGNS OF INJURIES/BEHAVIORS THAT COULD BE RELATED TO ABUSE/NEGLECT?: NO

## 2024-03-04 SDOH — SOCIAL STABILITY: SOCIAL INSECURITY: ARE YOU OR HAVE YOU BEEN THREATENED OR ABUSED PHYSICALLY, EMOTIONALLY, OR SEXUALLY BY ANYONE?: NO

## 2024-03-04 SDOH — SOCIAL STABILITY: SOCIAL INSECURITY: ABUSE: ADULT

## 2024-03-04 SDOH — SOCIAL STABILITY: SOCIAL INSECURITY: DO YOU FEEL UNSAFE GOING BACK TO THE PLACE WHERE YOU ARE LIVING?: NO

## 2024-03-04 SDOH — SOCIAL STABILITY: SOCIAL INSECURITY: WERE YOU ABLE TO COMPLETE ALL THE BEHAVIORAL HEALTH SCREENINGS?: YES

## 2024-03-04 SDOH — SOCIAL STABILITY: SOCIAL INSECURITY: DO YOU FEEL ANYONE HAS EXPLOITED OR TAKEN ADVANTAGE OF YOU FINANCIALLY OR OF YOUR PERSONAL PROPERTY?: NO

## 2024-03-04 ASSESSMENT — ACTIVITIES OF DAILY LIVING (ADL)
WALKS IN HOME: NEEDS ASSISTANCE
HEARING - RIGHT EAR: DIFFICULTY WITH NOISE
JUDGMENT_ADEQUATE_SAFELY_COMPLETE_DAILY_ACTIVITIES: YES
DRESSING YOURSELF: INDEPENDENT
FEEDING YOURSELF: INDEPENDENT
BATHING: NEEDS ASSISTANCE
TOILETING: NEEDS ASSISTANCE
PATIENT'S MEMORY ADEQUATE TO SAFELY COMPLETE DAILY ACTIVITIES?: YES
ADEQUATE_TO_COMPLETE_ADL: YES
GROOMING: INDEPENDENT
LACK_OF_TRANSPORTATION: NO
HEARING - LEFT EAR: DIFFICULTY WITH NOISE

## 2024-03-04 ASSESSMENT — ENCOUNTER SYMPTOMS
DYSURIA: 0
APPETITE CHANGE: 1
TROUBLE SWALLOWING: 0
NUMBNESS: 0
DIZZINESS: 0
HEMATURIA: 0
WHEEZING: 0
WEAKNESS: 0
SORE THROAT: 0
MYALGIAS: 0
HEADACHES: 0
SHORTNESS OF BREATH: 1
EYE PAIN: 0
CHILLS: 0
UNEXPECTED WEIGHT CHANGE: 1
COUGH: 1
FEVER: 0
JOINT SWELLING: 0
ARTHRALGIAS: 0
CONFUSION: 0

## 2024-03-04 ASSESSMENT — LIFESTYLE VARIABLES
SKIP TO QUESTIONS 9-10: 1
HOW OFTEN DO YOU HAVE A DRINK CONTAINING ALCOHOL: NEVER
HOW MANY STANDARD DRINKS CONTAINING ALCOHOL DO YOU HAVE ON A TYPICAL DAY: PATIENT DOES NOT DRINK
SUBSTANCE_ABUSE_PAST_12_MONTHS: NO
HOW OFTEN DO YOU HAVE 6 OR MORE DRINKS ON ONE OCCASION: NEVER
AUDIT-C TOTAL SCORE: 0
PRESCIPTION_ABUSE_PAST_12_MONTHS: NO
AUDIT-C TOTAL SCORE: 0

## 2024-03-04 ASSESSMENT — COGNITIVE AND FUNCTIONAL STATUS - GENERAL
TOILETING: A LITTLE
MOBILITY SCORE: 19
DAILY ACTIVITIY SCORE: 22
MOVING TO AND FROM BED TO CHAIR: A LITTLE
WALKING IN HOSPITAL ROOM: A LITTLE
HELP NEEDED FOR BATHING: A LITTLE
STANDING UP FROM CHAIR USING ARMS: A LITTLE
CLIMB 3 TO 5 STEPS WITH RAILING: A LOT
PATIENT BASELINE BEDBOUND: NO

## 2024-03-04 ASSESSMENT — PATIENT HEALTH QUESTIONNAIRE - PHQ9
SUM OF ALL RESPONSES TO PHQ9 QUESTIONS 1 & 2: 0
1. LITTLE INTEREST OR PLEASURE IN DOING THINGS: NOT AT ALL
2. FEELING DOWN, DEPRESSED OR HOPELESS: NOT AT ALL

## 2024-03-04 ASSESSMENT — COLUMBIA-SUICIDE SEVERITY RATING SCALE - C-SSRS
1. IN THE PAST MONTH, HAVE YOU WISHED YOU WERE DEAD OR WISHED YOU COULD GO TO SLEEP AND NOT WAKE UP?: NO
6. HAVE YOU EVER DONE ANYTHING, STARTED TO DO ANYTHING, OR PREPARED TO DO ANYTHING TO END YOUR LIFE?: NO
2. HAVE YOU ACTUALLY HAD ANY THOUGHTS OF KILLING YOURSELF?: NO

## 2024-03-04 ASSESSMENT — PAIN SCALES - GENERAL: PAINLEVEL_OUTOF10: 0 - NO PAIN

## 2024-03-04 NOTE — CONSULTS
Cardiac Electrophysiology Consult     Chief complaint: ICD Shock  Referred by: Dr. Hong MCKENZIE  Olge Yang is a 78 y.o. year old male patient with h/o cardiac amyloidosis, HFrEF, HTN, SSS s/p ICD with a history of ICD shocks (some inappropriate due to A-fib with RVR and some tach induced tachycardia resulting in VT with appropriate ICD shock), who presented with significant abdominal pain.  Patient reports that he woke up in the morning with what felt like an ICD shock.  He states it felt familiar to the previous shocks he has had.  He reports that he has been having significant abdominal pain for the last couple of days but denies any other medical conditions.  Has been taking all his medications as prescribed.  In the ER patient noted to be in atrial fibrillation with rate control.  CT imaging showed concerning findings on abdomen and liver for metastatic: CA and GI has been engaged for further evaluation.  EP being consulted for possible ICD shock.    family history includes Coronary artery disease in his father and mother; Heart attack in his father; Hypertension in his father; cardiac disorder in his father and mother.      Allergies:  No Known Allergies     Medications:  Current Outpatient Medications   Medication Instructions    acetaminophen (TYLENOL) 650 mg, oral, Every 4 hours PRN, For pain    amiodarone (Pacerone) 400 mg tablet Take 0.5 tablets (200 mg) by mouth once daily.    apixaban (ELIQUIS) 5 mg, oral, 2 times daily    Entresto 24-26 mg tablet oral, 2 times daily    Farxiga 10 mg, oral, Daily    magnesium oxide (Mag-Ox) 400 mg (241.3 mg magnesium) tablet 1 tablet, oral, Daily    metoprolol succinate XL (Toprol-XL) 50 mg 24 hr tablet 1 tablet, oral, Daily    nitroglycerin (NITROSTAT) 0.4 mg, sublingual, Every 5 min PRN    oxybutynin XL (Ditropan-XL) 10 mg 24 hr tablet 1 tablet, oral, Daily    polyethylene glycol (GLYCOLAX, MIRALAX) 17 g, oral, Daily    torsemide (DEMADEX) 20 mg, oral, Daily     "Vyndamax 61 mg capsule 1 tablet, oral, Daily      Scheduled medications   Medication Dose Route Frequency    amiodarone  200 mg oral Daily    apixaban  5 mg oral BID    dapagliflozin propanediol  10 mg oral Daily    magnesium oxide  400 mg oral Daily    melatonin  3 mg oral Daily    metoprolol succinate XL  50 mg oral Daily    oxybutynin  5 mg oral BID    polyethylene glycol  17 g oral Daily    [Held by provider] sacubitriL-valsartan  1 tablet oral BID    tafamidis  61 mg oral Daily        Last Recorded Vitals:      3/3/2024     4:45 PM 3/3/2024     4:47 PM 3/3/2024    11:09 PM 3/4/2024     1:14 AM 3/4/2024     4:30 AM 3/4/2024     5:22 AM 3/4/2024     6:03 AM   Vitals   Systolic  100 96 93 86 86 93   Diastolic  64 52 55 51 53 50   Heart Rate 96  82 77 63 75 76   Temp  37.7 °C (99.9 °F)        Resp 18   16 16 16 16   Height (in)  1.702 m (5' 7\")        Weight (lb)  181        BMI  28.35 kg/m2        BSA (m2)  1.97 m2            Physical Exam  Constitutional:       Appearance: Normal appearance.   HENT:      Head: Normocephalic.   Cardiovascular:      Rate and Rhythm: Normal rate and regular rhythm.      Pulses: Normal pulses.      Heart sounds: No murmur heard.     Comments: left sided implant healed well, no ecchymosis, hematoma or drainage noted  Pulmonary:      Effort: Pulmonary effort is normal. No respiratory distress.   Abdominal:      Palpations: Abdomen is soft.      Tenderness: There is generalized abdominal tenderness.   Musculoskeletal:         General: No swelling.   Skin:     General: Skin is warm and dry.   Neurological:      Mental Status: He is alert.   Psychiatric:         Mood and Affect: Mood normal.           My Interpretation of Reviewed Study(s):  Transesophageal Echo (June 2023): Decreased LV function with an EF of 2025% with no pericardial effusion noted.  Left atrium moderately dilated with mildly dilated right atrium.  Assessment/Plan   #?ICD Shock  #h/o Inappropriate ICD Shocks - AF w/ " RVR  #h/o Appropriate ICD shocks - AF resulting in VT      I personally interrogated and evaluated the device there have been no ICD therapies delivered since last June.  Patient has had persistent atrial fibrillation with relatively control.  ICD appears to be functioning well without any other significant tachyarrhythmias (apart from atrial fibrillation) denoted by the device.  -No ICD Shocks    #Persistent Atrial Fibrillation  Patients Hgb dropped from 12-13 in Sept to 9.7 now, he does report some red urine intermittently (although not sure if it was just dark or actually red), denies any blood in his stool or dark tarry stools. Given CT Abd findings concerning for Colon CA possible ongoing GI bleeding is cause of Anemia.   -Recommend repeating CBC to make sure Hgb actually low, and if low then may need Gi workup  -Consider holding AC until workup to make sure no ongoing bleeding  -c/w Amiodarone    Code status: Full Code    I spent 38 minutes in the professional and overall care of this patient.    Juan J Massey MD [facility]  Cardiac Electrophysiology    Thank you very much for allowing me to participate in the care of this pleasant patient. Please do not hesitate to contact me with any further questions or concerns regarding their care.    **Disclaimer: This note was dictated by speech recognition, and every effort has been made to prevent any error in transcription, however minor errors may be present**

## 2024-03-04 NOTE — PROGRESS NOTES
Oleg Yang is a 78 y.o. male on day 1 of admission presenting with Metastatic malignant neoplasm, unspecified site (CMS/HCC).      Subjective   Patient seen and examined at bedside.  Discussion with patient regarding biopsy needs to be done.  Also discussion regarding CT scan results.  Patient's daughter was at bedside.       Objective     Last Recorded Vitals  BP 93/50 (BP Location: Left arm)   Pulse 76   Temp 37.7 °C (99.9 °F) (Temporal)   Resp 16   Wt 82.1 kg (181 lb)   SpO2 95%   Intake/Output last 3 Shifts:    Intake/Output Summary (Last 24 hours) at 3/4/2024 1257  Last data filed at 3/4/2024 0606  Gross per 24 hour   Intake 250 ml   Output --   Net 250 ml       Admission Weight  Weight: 82.1 kg (181 lb) (03/03/24 1647)    Daily Weight  03/03/24 : 82.1 kg (181 lb)    Image Results  ECG 12 lead  Sinus rhythm with AV dissociation and Accelerated Junctional rhythm with Premature supraventricular complexes and with occasional Premature  ventricular complexes  Left axis deviation  Cannot rule out Inferior infarct (cited on or before 21-JUN-2023)  Prolonged QT  Abnormal ECG  When compared with ECG of 21-JUN-2023 13:18,  Junctional rhythm has replaced Sinus rhythm  Vent. rate has increased BY  38 BPM  Incomplete right bundle branch block is no longer Present  Criteria for Septal infarct are no longer Present      Physical Exam  Physical Exam:  General:  Pleasant and cooperative. No apparent distress.  HEENT:  Normocephalic, atraumatic, mucus membranes moist.   Neck:  Trachea midline.  No JVD.    Chest:  Clear to auscultation bilaterally. No wheezes, rales, or rhonchi.  CV:  Regular rate and rhythm.  Positive S1/S2.  Left-sided implant.  Abdomen: Bowel sounds present in all four quadrants,.  Generalized abdominal tenderness  Extremities:  No lower extremity edema or cyanosis.   Neurological:  AAOx3. No focal deficits.  Skin:  Warm and dry.   Relevant Results  Results for orders placed or performed during the  hospital encounter of 03/03/24 (from the past 24 hour(s))   CBC and Auto Differential   Result Value Ref Range    WBC 7.3 4.4 - 11.3 x10*3/uL    nRBC 0.0 0.0 - 0.0 /100 WBCs    RBC 3.85 (L) 4.50 - 5.90 x10*6/uL    Hemoglobin 9.7 (L) 13.5 - 17.5 g/dL    Hematocrit 31.8 (L) 41.0 - 52.0 %    MCV 83 80 - 100 fL    MCH 25.2 (L) 26.0 - 34.0 pg    MCHC 30.5 (L) 32.0 - 36.0 g/dL    RDW 16.1 (H) 11.5 - 14.5 %    Platelets 293 150 - 450 x10*3/uL    Neutrophils % 79.6 40.0 - 80.0 %    Immature Granulocytes %, Automated 0.5 0.0 - 0.9 %    Lymphocytes % 10.2 13.0 - 44.0 %    Monocytes % 5.4 2.0 - 10.0 %    Eosinophils % 4.0 0.0 - 6.0 %    Basophils % 0.3 0.0 - 2.0 %    Neutrophils Absolute 5.80 (H) 1.60 - 5.50 x10*3/uL    Immature Granulocytes Absolute, Automated 0.04 0.00 - 0.50 x10*3/uL    Lymphocytes Absolute 0.74 (L) 0.80 - 3.00 x10*3/uL    Monocytes Absolute 0.39 0.05 - 0.80 x10*3/uL    Eosinophils Absolute 0.29 0.00 - 0.40 x10*3/uL    Basophils Absolute 0.02 0.00 - 0.10 x10*3/uL   Comprehensive metabolic panel   Result Value Ref Range    Glucose 101 (H) 74 - 99 mg/dL    Sodium 138 136 - 145 mmol/L    Potassium 4.0 3.5 - 5.3 mmol/L    Chloride 103 98 - 107 mmol/L    Bicarbonate 24 21 - 32 mmol/L    Anion Gap 15 10 - 20 mmol/L    Urea Nitrogen 25 (H) 6 - 23 mg/dL    Creatinine 1.25 0.50 - 1.30 mg/dL    eGFR 59 (L) >60 mL/min/1.73m*2    Calcium 9.0 8.6 - 10.3 mg/dL    Albumin 3.6 3.4 - 5.0 g/dL    Alkaline Phosphatase 308 (H) 33 - 136 U/L    Total Protein 6.8 6.4 - 8.2 g/dL    AST 33 9 - 39 U/L    Bilirubin, Total 1.0 0.0 - 1.2 mg/dL    ALT 29 10 - 52 U/L   Lactate   Result Value Ref Range    Lactate 1.5 0.4 - 2.0 mmol/L   Protime-INR   Result Value Ref Range    Protime 17.1 (H) 9.8 - 12.8 seconds    INR 1.5 (H) 0.9 - 1.1   B-Type Natriuretic Peptide   Result Value Ref Range     (H) 0 - 99 pg/mL   Troponin I, High Sensitivity   Result Value Ref Range    Troponin I, High Sensitivity 62 (HH) 0 - 20 ng/L    Carcinoembryonic Antigen   Result Value Ref Range    Carcinoembryonic .5 ug/L   AFP Tumor Marker   Result Value Ref Range    Alpha-Fetoprotein <4 0 - 9 ng/mL   Cancer Antigen 19-9   Result Value Ref Range    Cancer AG 19-9 23,616.53 (H) <35.00 U/mL   ECG 12 lead   Result Value Ref Range    Ventricular Rate 89 BPM    Atrial Rate 89 BPM    QRS Duration 108 ms    QT Interval 434 ms    QTC Calculation(Bazett) 528 ms    P Axis 46 degrees    R Axis -87 degrees    T Axis 70 degrees    QRS Count 15 beats    Q Onset 205 ms    T Offset 422 ms    QTC Fredericia 494 ms   Urinalysis with Reflex Culture and Microscopic   Result Value Ref Range    Color, Urine Amanda (N) Straw, Yellow    Appearance, Urine Hazy (N) Clear    Specific Gravity, Urine 1.019 1.005 - 1.035    pH, Urine 5.0 5.0, 5.5, 6.0, 6.5, 7.0, 7.5, 8.0    Protein, Urine 30 (1+) (N) NEGATIVE mg/dL    Glucose, Urine NEGATIVE NEGATIVE mg/dL    Blood, Urine NEGATIVE NEGATIVE    Ketones, Urine NEGATIVE NEGATIVE mg/dL    Bilirubin, Urine NEGATIVE NEGATIVE    Urobilinogen, Urine 4.0 (N) <2.0 mg/dL    Nitrite, Urine NEGATIVE NEGATIVE    Leukocyte Esterase, Urine NEGATIVE NEGATIVE   Extra Urine Gray Tube   Result Value Ref Range    Extra Tube Hold for add-ons.    Urinalysis Microscopic   Result Value Ref Range    WBC, Urine 6-10 (A) 1-5, NONE /HPF    WBC Clumps, Urine OCCASIONAL Reference range not established. /HPF    RBC, Urine 1-2 NONE, 1-2, 3-5 /HPF    Squamous Epithelial Cells, Urine 1-9 (SPARSE) Reference range not established. /HPF    Bacteria, Urine 1+ (A) NONE SEEN /HPF    Mucus, Urine 1+ Reference range not established. /LPF    Hyaline Casts, Urine 4+ (A) NONE /LPF   Troponin I, High Sensitivity   Result Value Ref Range    Troponin I, High Sensitivity 65 (HH) 0 - 20 ng/L   Magnesium   Result Value Ref Range    Magnesium 1.79 1.60 - 2.40 mg/dL   CBC   Result Value Ref Range    WBC 8.0 4.4 - 11.3 x10*3/uL    nRBC 0.0 0.0 - 0.0 /100 WBCs    RBC 3.57 (L) 4.50 -  5.90 x10*6/uL    Hemoglobin 9.1 (L) 13.5 - 17.5 g/dL    Hematocrit 29.9 (L) 41.0 - 52.0 %    MCV 84 80 - 100 fL    MCH 25.5 (L) 26.0 - 34.0 pg    MCHC 30.4 (L) 32.0 - 36.0 g/dL    RDW 16.2 (H) 11.5 - 14.5 %    Platelets 257 150 - 450 x10*3/uL   Renal function panel   Result Value Ref Range    Glucose 108 (H) 74 - 99 mg/dL    Sodium 139 136 - 145 mmol/L    Potassium 4.4 3.5 - 5.3 mmol/L    Chloride 106 98 - 107 mmol/L    Bicarbonate 27 21 - 32 mmol/L    Anion Gap 10 10 - 20 mmol/L    Urea Nitrogen 35 (H) 6 - 23 mg/dL    Creatinine 1.32 (H) 0.50 - 1.30 mg/dL    eGFR 55 (L) >60 mL/min/1.73m*2    Calcium 8.7 8.6 - 10.3 mg/dL    Phosphorus 3.2 2.5 - 4.9 mg/dL    Albumin 3.4 3.4 - 5.0 g/dL   Magnesium   Result Value Ref Range    Magnesium 1.95 1.60 - 2.40 mg/dL    ECG 12 lead    Result Date: 3/4/2024  Sinus rhythm with AV dissociation and Accelerated Junctional rhythm with Premature supraventricular complexes and with occasional Premature ventricular complexes Left axis deviation Cannot rule out Inferior infarct (cited on or before 21-JUN-2023) Prolonged QT Abnormal ECG When compared with ECG of 21-JUN-2023 13:18, Junctional rhythm has replaced Sinus rhythm Vent. rate has increased BY  38 BPM Incomplete right bundle branch block is no longer Present Criteria for Septal infarct are no longer Present    US scrotum w doppler    Result Date: 3/3/2024  Interpreted By:  Oleg Pineda, STUDY: US SCROTUM WITH DOPPLER;  3/3/2024 6:28 pm   INDICATION: Signs/Symptoms:b/l scrotal swelling and pain.   COMPARISON: None.   ACCESSION NUMBER(S): [id number]   ORDERING CLINICIAN: LILIANA SIEGEL   TECHNIQUE: Multiple ultrasonographic images of scrotum and tested were obtained.   FINDINGS: RIGHT HEMISCROTUM:   RIGHT TESTICLE: The right testicle measures 3.17 x 2.04x 3.68. The right testicle demonstrates a homogeneous echotexture and normal contour. Normal vascularity and Doppler waveforms are observed in the right testicle. 7 mm right  testicular cyst   RIGHT EPIDIDYMIS: The right epididymal head measures .89 x 1.21 x 1.06 and is within normal limits. Mildly heterogeneous   LEFT HEMISCROTUM:   LEFT TESTICLE: The left testicle measures 2.63 x 2.33 x 3.70. The left testicle demonstrates a homogeneous echotexture and normal contour. Normal vascularity and Doppler waveforms are observed in the left testicle.   LEFT EPIDIDYMIS: The left epididymal head measures 1.12 x 1.47 x 1.08 and is within normal limits.  Mildly heterogeneous   Large bilateral inguinal hernias. Small bilateral hydroceles.       Large bilateral inguinal hernias containing fat and bowel.   No evidence of torsion or inflammation. Mild bilateral heterogeneity of the epididymal structures   MACRO: None   Signed by: Oleg Pineda 3/3/2024 7:26 PM Dictation workstation:   [id number]    CT abdomen pelvis w IV contrast    Result Date: 3/3/2024  Interpreted By:  Oleg Pineda, STUDY: CT ABDOMEN PELVIS W IV CONTRAST;  3/3/2024 6:56 pm   INDICATION: Signs/Symptoms:RLQ pain, b/l inguinal hernias w/ increased swelling and pain.   COMPARISON: None.   ACCESSION NUMBER(S): [id number]   ORDERING CLINICIAN: LILIANA SIEGEL   TECHNIQUE: CT of the abdomen and pelvis was performed.  Standard contiguous axial images were obtained at 3 mm slice thickness through the abdomen and pelvis. Coronal and sagittal reconstructions at 3 mm slice thickness were performed.     FINDINGS: Heart size is enlarged.   Interrogation of the lung base shows multiple pulmonary nodules including a right lower lobe nodule measuring 1.6 cm. Findings compatible pulmonary metastasis. Nodules seen in the right middle lobe as well as left lower lobe.   Multiple hepatic metastasis are seen. Reference 5 cm lesion at the dome of the liver. 3.2 cm lesion seen in the inferior right hepatic lobe   Greater than 10 lesions detected. No hydronephrosis.       Retrocrural adenopathy is seen. Reference 1.4 retroperitoneal adenopathy seen in  the para-aortic region also metastatic appearing vascular calcification. Gallstones. No biliary duct dilatation. Moderate retained stool in the colon. There is left upper quadrant 2.5 cm soft tissue lesion adjacent to the splenic flexure. Wall thickening is seen of the colon.. Features suspicious for underlying colonic neoplasm.   Prostate gland is enlarged. It deforms the bladder base. Left hip arthroplasty changes. Bilateral fat fat and bowel containing inguinal hernias which are fairly large. No evidence of obstruction. Right inguinal hernia measures 8.4 cm in left measures 8 8.7 cm in width.       Multilevel degenerative disc disease is seen.       Findings compatible with widespread metastatic disease including in the lungs  with numerous hepatic metastatic disease also retroperitoneal and retrocrural adenopathy is seen. Left upper quadrant soft tissue mass seen between the stomach in the liver. Findings favor metastatic colon carcinoma given the appearance. Correlation with any known colonoscopy results advised. Robust vascular calcification.   Large bilateral fat and bowel containing inguinal hernias   MACRO: None   Signed by: Oleg Pineda 3/3/2024 7:22 PM Dictation workstation:   [id number]    XR chest 2 views    Result Date: 3/3/2024  Interpreted By:  Kaleb Wen, STUDY: XR CHEST 2 VIEWS;  3/3/2024 6:01 pm   INDICATION: Signs/Symptoms:CP.   COMPARISON: Radiographs of the chest dated 06/20/2023;   ACCESSION NUMBER(S): [id number]   ORDERING CLINICIAN: LILIANA SIEGEL   FINDINGS: PA and lateral radiographs of the chest were provided.   Single left lead pacemaker/AICD is similar in appearance to prior exam.   CARDIOMEDIASTINAL SILHOUETTE: Cardiomediastinal silhouette is mildly enlarged, similar in appearance to prior exam.   LUNGS: Somewhat low lung volumes are present in the lungs bilaterally with bibasilar atelectasis. There is suggestion of new nodular lesion is seen in the right lung compared  to prior examination on 06/20/2023, incompletely characterized. No sizable pleural effusion or consolidation is evident.   ABDOMEN: No remarkable upper abdominal findings.   BONES: No acute osseous changes.       Somewhat low lung volumes in the lungs bilaterally with bibasilar atelectasis, without evidence of new consolidation or sizable pleural effusion, although there are new nodular opacities is present in the right lung, incompletely characterized on current exam. Consider dedicated CT of the chest for further evaluation.   MACRO: None   Signed by: Kaleb Wen 3/3/2024 6:31 PM Dictation workstation:   [id number]  Scheduled medications  amiodarone, 200 mg, oral, Daily  [Held by provider] apixaban, 5 mg, oral, BID  dapagliflozin propanediol, 10 mg, oral, Daily  magnesium oxide, 400 mg, oral, Daily  melatonin, 3 mg, oral, Daily  metoprolol succinate XL, 50 mg, oral, Daily  oxybutynin, 5 mg, oral, BID  polyethylene glycol, 17 g, oral, Daily  [Held by provider] sacubitriL-valsartan, 1 tablet, oral, BID  tafamidis, 61 mg, oral, Daily      Continuous medications     PRN medications  PRN medications: acetaminophen, nitroglycerin, ondansetron, oxyCODONE, oxyCODONE-acetaminophen, polyethylene glycol        Assessment/Plan   Oleg Yang is a 78-year-old male with a past medical history of SSS s/p ICD, HTN, HFrEF 2025%, cardiac amyloidosis, A-fib-on amiodarone and Eliquis, presenting to the hospital with severe abdominal pain.  CT abdomen pelvis shows widespread metastatic disease to the lungs, liver, retroperitoneal and retrocrural adenopathy suspicious for metastatic colon disease.  He is being admitted to medicine for management of suspected metastatic colon cancer workup.    # Suspected metastatic malignancy to the lung and liver  # Diffuse abdominal pain  # Suspected ICD shock  # Anemia  # Hypotension  - Cardiac EP was consulted due to suspected ICD shock as patient presented with sudden shock overnight.   They advised to continue amiodarone.  This is not likely a shock of ICD.  - Hemoglobin is dropped from baseline.  There could be a possible GI bleed that is causing the anemia.  We will hold Eliquis and continue to monitor hemoglobin closely.  Patient also does report having some right ear intermittently.  - We have consulted GI team for further recommendations on CT abdomen pelvis findings.  - We have also consulted heme-onc team for further recommendations going forward  - Plan is to have biopsy as inpatient.  Will continue to hold Eliquis for 48 hours.  We will add CEA, AFP, CA 19-9.  - We will also give pain control for abdominal pain with Roxicodone as needed.  - Patient was hypotensive on admission.  He continues to be hypotensive.  We will hold his torsemide and Entresto for now.    # HTN  # HFrEF EF 25%  # Cardiac amyloidosis  # A-fib-on amiodarone and Eliquis  # SSS s/p ICD  - Continue amiodarone, metoprolol, Farxiga, Tafamidis  - Hold Entresto and torsemide                  Jayjay Storey MD  PGY1 internal medicine

## 2024-03-04 NOTE — ACP (ADVANCE CARE PLANNING)
Code status discussion completed with patient and his daughter present.  He states he wants to be a full code, but clearly states he does not want to remain on life support if there is no meaningful recovery.

## 2024-03-04 NOTE — H&P (VIEW-ONLY)
Department of Internal Medicine  Gastroenterology  Consult note      Reason for Consult: Abdominal pain, suspected metastatic cancer with colon primary    Chief Complaint: Abdominal pain    History Obtained from: chart review and patient reports    History of Present Illness      The patient is a 78 y.o. male with signficant past medical history of SSS s/p ICD, on Eliquis, cardiac amyloidosis, HFrEF, hypertension who presents for abdominal pain.     He states he has been having right lower quadrant abdominal pain that has improved since he has been here.  He describes it as sore.  He denies any radiation or aggravating factors.  It does improve after a bowel movement.  He is also having some groin pain which feels sore.  He denies any constipation, diarrhea, or hematochezia.  He states he has been noticing some black stools but none that are black and tarry.  He has had a change in bowel habits and used to go every 2 to 3 days and is now going less frequency.  He has to take laxatives and stool softeners to allow his bowels move.    He endorses some mild nausea without vomiting.  He denies any odynophagia or dysphagia.  He does endorse some acid reflux occasionally takes Tums as needed.  He cannot better quantify how often he needs Tums as he states its only when he eats things he knows he should not be.    He denies any use of tobacco or recreational drugs.  He drinks alcohol less than monthly.  He is on a blood thinner Eliquis and last dose was last night 3 3:24 PM.  He denies use of daily NSAIDs or Tylenol.    He states his last colonoscopy was 8 to 10 years and believes it was done here at [place].  He was told he had some polyps removed but does not recall being told he needed to go back for another check.    No history of abdominal surgeries      Allergies  Patient has no known allergies.    Current Medication    Current Facility-Administered Medications:     acetaminophen (Tylenol) tablet 650 mg, 650 mg, oral,  q4h PRN, Jimmie Pitts, DO    amiodarone (Pacerone) tablet 200 mg, 200 mg, oral, Daily, Jimmie Pitts,     apixaban (Eliquis) tablet 5 mg, 5 mg, oral, BID, Jimmie Pitts, DO, 5 mg at 03/03/24 2307    dapagliflozin propanediol (Farxiga) tablet 10 mg, 10 mg, oral, Daily, Jimmie Pitts,     magnesium oxide (Mag-Ox) tablet 400 mg, 400 mg, oral, Daily, Jimmie Pitts,     melatonin tablet 3 mg, 3 mg, oral, Daily, Jimmie Pitts, DO, 3 mg at 03/03/24 2308    metoprolol succinate XL (Toprol-XL) 24 hr tablet 50 mg, 50 mg, oral, Daily, Jimmie Pitts DO    nitroglycerin (Nitrostat) SL tablet 0.4 mg, 0.4 mg, sublingual, q5 min PRN, Jimmie Pitts DO    ondansetron (Zofran) injection 4 mg, 4 mg, intravenous, q8h PRN, Jimmie Pitts DO    oxybutynin (Ditropan) tablet 5 mg, 5 mg, oral, BID, Jimmie Pitts DO, 5 mg at 03/03/24 2308    oxyCODONE (Roxicodone) immediate release tablet 5 mg, 5 mg, oral, q6h PRN, Jimmie Pitts,     oxyCODONE-acetaminophen (Percocet) 5-325 mg per tablet 2 tablet, 2 tablet, oral, q8h PRN, Jimmie Pitts DO    polyethylene glycol (Glycolax, Miralax) packet 17 g, 17 g, oral, Daily, Jimmie Pitts,     polyethylene glycol (Glycolax, Miralax) packet 17 g, 17 g, oral, Daily PRN, Jimmie Pitts DO    [Held by provider] sacubitriL-valsartan (Entresto) 24-26 mg per tablet 1 tablet, 1 tablet, oral, BID, Jimmie Pitts, , 1 tablet at 03/03/24 2309    tafamidis (Vyndamax) capsule 61 mg, 61 mg, oral, Daily, Jimmie A Hong, DO    Current Outpatient Medications:     acetaminophen (Tylenol) 325 mg tablet, Take 2 tablets (650 mg) by mouth every 4 hours if needed. For pain, Disp: , Rfl:     amiodarone (Pacerone) 400 mg tablet, Take 0.5 tablets (200 mg) by mouth once daily., Disp: , Rfl:     apixaban (Eliquis) 5 mg tablet, Take 1 tablet (5 mg) by mouth 2 times a day., Disp: 60 tablet, Rfl: 11    Entresto 24-26 mg tablet, Take by mouth twice a day., Disp: , Rfl:     Farxiga 10 mg, Take 1 tablet (10 mg) by mouth once daily., Disp: 90 tablet, Rfl:  3    magnesium oxide (Mag-Ox) 400 mg (241.3 mg magnesium) tablet, Take 1 tablet (400 mg) by mouth once daily., Disp: , Rfl:     metoprolol succinate XL (Toprol-XL) 50 mg 24 hr tablet, Take 1 tablet (50 mg) by mouth once daily., Disp: , Rfl:     nitroglycerin (Nitrostat) 0.4 mg SL tablet, Place 1 tablet (0.4 mg) under the tongue every 5 minutes if needed., Disp: , Rfl:     oxybutynin XL (Ditropan-XL) 10 mg 24 hr tablet, Take 1 tablet (10 mg) by mouth once daily., Disp: , Rfl:     polyethylene glycol (Glycolax, Miralax) 17 gram/dose powder, Take 17 g by mouth once daily., Disp: , Rfl:     torsemide (Demadex) 20 mg tablet, Take 1 tablet (20 mg) by mouth once daily., Disp: 90 tablet, Rfl: 3    Vyndamax 61 mg capsule, Take 1 tablet by mouth once daily., Disp: , Rfl:     Past Medical History  Active Ambulatory Problems     Diagnosis Date Noted    Acquired hammer toes of both feet 02/20/2018    Arthritis 06/29/2023    Athscl heart disease of native coronary artery w/o ang pctrs 06/29/2023    Benign essential HTN 06/28/2013    Cardiac amyloidosis (CMS/HCC) 06/29/2023    Chronic systolic (congestive) heart failure (CMS/HCC) 06/29/2023    Hypercholesterolemia 06/29/2023    Paroxysmal atrial fibrillation (CMS/HCC) 06/29/2023    Thoracic aortic aneurysm (CMS/HCC) 06/29/2023    Ventricular tachycardia (CMS/HCC) 06/29/2023    Varicose veins of both lower extremities 09/08/2023    Bilateral inguinal hernia 09/08/2023    Deformity of metatarsal 02/20/2018    Disability of walking 12/12/2017    Foot callus 10/17/2019    Foot pain 10/10/2017    Ingrowing nail 10/10/2017    Lower leg edema 10/17/2019    Onychomycosis 10/10/2017    Pain of toe of left foot 10/17/2019    Peripheral arterial occlusive disease (CMS/HCC) 10/17/2019    Dilated cardiomyopathy (CMS/HCC) 09/08/2023    Class 1 obesity due to excess calories with body mass index (BMI) of 32.0 to 32.9 in adult 09/08/2023    Cellulitis of right lower extremity 10/24/2023      Resolved Ambulatory Problems     Diagnosis Date Noted    Chronic systolic dysfunction of left ventricle 03/17/2021    Peripheral vascular disease (CMS/HCC) 12/12/2017    Asymptomatic varicose veins 09/08/2023    Inguinal hernia 09/08/2023    Atrial fibrillation (CMS/HCC) 09/08/2023    Cardiomyopathy (CMS/HCC) 09/08/2023     Past Medical History:   Diagnosis Date    Other specified health status        Past Surgical History  Past Surgical History:   Procedure Laterality Date    KNEE SURGERY  06/21/2021    Knee Surgery    OTHER SURGICAL HISTORY  06/21/2021    Cardiac catheterization    TOTAL HIP ARTHROPLASTY  06/21/2021    Total Hip Replacement       Family History  Family History   Problem Relation Name Age of Onset    Other (cardiac disorder) Mother      Coronary artery disease Mother      Other (cardiac disorder) Father      Coronary artery disease Father      Hypertension Father      Heart attack Father       No family history of stomach or colon cancer    Social History  TOBACCO:  reports that he has never smoked. He has never used smokeless tobacco.  ETOH:  has no history on file for alcohol use.  DRUGS:  has no history on file for drug use.  MARITAL STATUS:   OCCUPATION:    Review of Systems  Review of Systems   Constitutional:  Positive for appetite change and unexpected weight change (5-7lb). Negative for chills and fever.   HENT:  Negative for mouth sores, sore throat and trouble swallowing.    Eyes:  Negative for pain and visual disturbance.   Respiratory:  Positive for cough and shortness of breath (TELLEZ). Negative for wheezing.    Cardiovascular:  Negative for chest pain.   Genitourinary:  Negative for decreased urine volume, dysuria and hematuria.   Musculoskeletal:  Negative for arthralgias, joint swelling and myalgias.   Skin:  Negative for pallor and rash.   Neurological:  Negative for dizziness, weakness, numbness and headaches.   Psychiatric/Behavioral:  Negative for confusion.        PHYSICAL  "EXAM  VS: BP 93/50 (BP Location: Left arm)   Pulse 76   Temp 37.7 °C (99.9 °F) (Temporal)   Resp 16   Ht 1.702 m (5' 7\")   Wt 82.1 kg (181 lb)   SpO2 95%   BMI 28.35 kg/m²  Body mass index is 28.35 kg/m².  Physical Exam  Constitutional:       General: He is not in acute distress.     Appearance: Normal appearance.   HENT:      Head: Normocephalic and atraumatic.      Mouth/Throat:      Mouth: Mucous membranes are moist.      Pharynx: Oropharynx is clear.   Eyes:      General: No scleral icterus.     Extraocular Movements: Extraocular movements intact.      Conjunctiva/sclera: Conjunctivae normal.      Pupils: Pupils are equal, round, and reactive to light.   Cardiovascular:      Rate and Rhythm: Normal rate and regular rhythm.      Heart sounds: No murmur heard.  Pulmonary:      Effort: Pulmonary effort is normal.      Breath sounds: No wheezing or rhonchi.   Abdominal:      General: Bowel sounds are normal. There is no distension.      Palpations: Abdomen is soft. There is no mass.      Tenderness: There is abdominal tenderness (rlq).      Hernia: No hernia is present.   Musculoskeletal:         General: No swelling or deformity.   Skin:     General: Skin is warm.      Coloration: Skin is not jaundiced.   Neurological:      General: No focal deficit present.      Mental Status: He is alert and oriented to person, place, and time.   Psychiatric:         Mood and Affect: Mood normal.          DATA  Recent blood work and relevant radiology studies were reviewed and discussed with the patient   Results from last 7 days   Lab Units 03/03/24  1656   WBC AUTO x10*3/uL 7.3   RBC AUTO x10*6/uL 3.85*   HEMOGLOBIN g/dL 9.7*   HEMATOCRIT % 31.8*   MCV fL 83   MCHC g/dL 30.5*   RDW % 16.1*   PLATELETS AUTO x10*3/uL 293       Results from last 72 hours   Lab Units 03/03/24  1656   SODIUM mmol/L 138   POTASSIUM mmol/L 4.0   CHLORIDE mmol/L 103   CO2 mmol/L 24   BUN mg/dL 25*   CREATININE mg/dL 1.25   CALCIUM mg/dL 9.0 "   PROTEIN TOTAL g/dL 6.8   BILIRUBIN TOTAL mg/dL 1.0   ALK PHOS U/L 308*   AST U/L 33   ALT U/L 29       Results from last 72 hours   Lab Units 03/03/24  1656   INR  1.5*      Latest Reference Range & Units 03/03/24 16:56   Carcinoembryonic AG ug/L 207.5   Cancer AG 19-9 <35.00 U/mL 23,616.53 (H)   Alpha-Fetoprotein 0 - 9 ng/mL <4   (H): Data is abnormally high    RADIOLOGY REVIEW  CT abdomen pelvis with contrast 3/3/2024  IMPRESSION:  Findings compatible with widespread metastatic disease including in the lungs  with numerous hepatic metastatic disease also retroperitoneal and retrocrural adenopathy is seen. Left upper quadrant soft tissue mass seen between the stomach in the liver.  Findings favor metastatic colon carcinoma given the appearance.  Correlation with any known colonoscopy results advised. Robust  vascular calcification.      Large bilateral fat and bowel containing inguinal hernias    ENDOSCOPIC REVIEW  No endoscopy in [facility] or  ambulatory    IMPRESSION/RECOMMENDATIONS    Abnormal CT diffusely - likely metastatic disease in the lung, liver, retroperitoneal, and retrocrural adenopathy.  Soft tissue mass between the stomach and the liver.  Radiologist did find it most concerning for metastatic colon carcinoma.  Last known colonoscopy 7 to 10 years ago with report unavailable  CA 19-9 elevated 23k  Normocytic anemia - hgb (3/3) 9.7, baselines 13  A-fib, SSS, HFrEF, ICD -radiology has been consulted  Anticoagulation Eliquis    PLAN  -Reviewed CT scans with Dr. García, hepatic lesions appear easiest to go after for biopsy as opposed to full colonoscopy.  Will order CT-guided liver biopsy of the lesion.  Would not pursue full colonoscopy at this time unless absolutely necessary.  -Consider holding Eliquis so biopsy can be obtained at this moment unclear if he will do better to go for one of the metastatic lesions or obtain full colonoscopy  -Currently on a general diet  -Continue supportive care per primary  team    Discussed with ABEBE Nolan to follow    (Electronically signed byNita Mendez PA-C on 3/4/2024 at 7:55 AM)  Patient seen and examined.  I obtained an independent history from the patient.  His children were by the bedside.  Apparently this patient has lost some weight  And his appetite is poor.  He noticed the hernias approximately a year ago but was not that much bother but were constipation even though he has been taking Dulcolax now and then.  The exact dose and frequency could not be determined with certainty since he was rather very vague.  Certainly there was no bleeding noted.  He is admitting complaints or the onset of pain in both lower quadrants particular in the right side.  He is not aware that he has become any more constipated than before.  Since admission his abdominal pains have decreased.  He does not feel like eating anything.  There is no family history of colon cancer and I do not believe he had a colonoscopy at any time.  He is a retired mailman.  He has a slight hoarseness of the voice which apparently has been the same before and not changed recently.  No significant deformities noted in the neck.  No tachycardia lungs are clear abdomen shows that palpable firm sigmoid colon is present in the left side.  I am not able to feel a mass in the left upper quadrant area or in the epigastrium as noted on the CT scan.  I am also unable to feel the liver.  He has some tenderness right lower quadrant area however no masses are felt.  He has bilateral inguinal hernias which to some extent reducible.  I reviewed the patient's CT which unfortunately shows multiple lesions there is 1 particular lesion right at the very periphery of the liver which could very be very easily accessed by a liver biopsy.  The patient is certainly constipated as per hard palpable sigmoid colon.  I did give the patient some 40 g of lactulose today.  The patient CEA significantly elevated indicating of most likely a  primary colonic lesion.  I would hold off doing any endoscopy procedure unless the oncology concerns feel that should be the primary focus of workup.  I will await their decision.  This note was created using Dragon dictation technology and unintended errors of omission commission may be present in spite of proofreading      Inpatient consult to Gastroenterology  Consult performed by: Nita Mendez PA-C  Consult ordered by: Jimmie Pitts DO

## 2024-03-04 NOTE — H&P
Don Saldaña it is [phone].  I will call her is it ok for the verbal?   History Of Present Illness  Oleg Yang is a 78 y.o. male presenting with chest pain and abdominal pain.  Yesterday early morning around 1am patient woke up to a sensation of being shocked.  His legs jolted up and then he had some chest pain.  He then also was having severe abdominal pain, 10/10, diffuse.  In the ED, vital signs stable.  Workup showed , troponin 62.  CT abd/pelv showed widespread metastatic disease to lungs, liver, and retroperitoneal and retrocrural adenopathy, suspicious for metastatic colon cancer.    Patient states his appetite has been poor, and he feels fatigued and weak.  Denies any blood in his stools.  Last colonoscopy was 7-10 years ago where he had some polyps removed.       Past Medical History  Past Medical History:   Diagnosis Date    Other specified health status     No pertinent past medical history       Surgical History  Past Surgical History:   Procedure Laterality Date    KNEE SURGERY  06/21/2021    Knee Surgery    OTHER SURGICAL HISTORY  06/21/2021    Cardiac catheterization    TOTAL HIP ARTHROPLASTY  06/21/2021    Total Hip Replacement        Social History  He reports that he has never smoked. He has never used smokeless tobacco. No history on file for alcohol use and drug use.    Family History  Family History   Problem Relation Name Age of Onset    Other (cardiac disorder) Mother      Coronary artery disease Mother      Other (cardiac disorder) Father      Coronary artery disease Father      Hypertension Father      Heart attack Father          Allergies  Patient has no known allergies.    Review of Systems   Constitutional:  Positive for appetite change and fatigue. Negative for chills and fever.   HENT:  Negative for ear pain and facial swelling.    Eyes:  Negative for pain, redness and visual disturbance.   Respiratory:  Negative for cough, shortness of breath and wheezing.    Cardiovascular:  Positive for chest pain. Negative for palpitations and leg swelling.    Gastrointestinal:  Positive for abdominal pain. Negative for constipation, diarrhea, nausea and vomiting.   Genitourinary:  Negative for difficulty urinating, dysuria, flank pain, frequency and hematuria.   Musculoskeletal:  Negative for back pain, joint swelling and neck pain.   Skin:  Negative for rash and wound.   Neurological:  Positive for dizziness. Negative for syncope and headaches.   Psychiatric/Behavioral:  Negative for confusion and hallucinations.    All other systems reviewed and are negative.       Physical Exam  Vitals and nursing note reviewed.   Constitutional:       General: He is not in acute distress.     Appearance: Normal appearance.   HENT:      Head: Normocephalic and atraumatic.      Mouth/Throat:      Mouth: Mucous membranes are moist.      Pharynx: Oropharynx is clear.   Eyes:      General: No scleral icterus.     Extraocular Movements: Extraocular movements intact.      Conjunctiva/sclera: Conjunctivae normal.      Pupils: Pupils are equal, round, and reactive to light.   Cardiovascular:      Rate and Rhythm: Normal rate. Rhythm irregular.      Pulses: Normal pulses.      Heart sounds: No murmur heard.  Pulmonary:      Effort: Pulmonary effort is normal. No respiratory distress.      Breath sounds: No wheezing, rhonchi or rales.   Abdominal:      General: Bowel sounds are normal. There is no distension.      Palpations: Abdomen is soft.      Tenderness: There is abdominal tenderness. There is no rebound.      Comments: Bilateral inguinal hernias present   Musculoskeletal:         General: No swelling, tenderness or deformity.      Cervical back: Neck supple. No tenderness.   Skin:     General: Skin is warm and dry.      Coloration: Skin is not jaundiced.      Findings: No erythema.   Neurological:      General: No focal deficit present.      Mental Status: He is alert and oriented to person, place, and time.   Psychiatric:         Mood and Affect: Mood normal.         Behavior: Behavior  "normal.          Last Recorded Vitals  Blood pressure 100/64, pulse 96, temperature 37.7 °C (99.9 °F), temperature source Temporal, resp. rate 18, height 1.702 m (5' 7\"), weight 82.1 kg (181 lb), SpO2 97 %.    Relevant Results    US scrotum w doppler    Result Date: 3/3/2024  Interpreted By:  Oleg Pineda, STUDY: US SCROTUM WITH DOPPLER;  3/3/2024 6:28 pm   INDICATION: Signs/Symptoms:b/l scrotal swelling and pain.   COMPARISON: None.   ACCESSION NUMBER(S): [id number]   ORDERING CLINICIAN: LILIANA SIEGEL   TECHNIQUE: Multiple ultrasonographic images of scrotum and tested were obtained.   FINDINGS: RIGHT HEMISCROTUM:   RIGHT TESTICLE: The right testicle measures 3.17 x 2.04x 3.68. The right testicle demonstrates a homogeneous echotexture and normal contour. Normal vascularity and Doppler waveforms are observed in the right testicle. 7 mm right testicular cyst   RIGHT EPIDIDYMIS: The right epididymal head measures .89 x 1.21 x 1.06 and is within normal limits. Mildly heterogeneous   LEFT HEMISCROTUM:   LEFT TESTICLE: The left testicle measures 2.63 x 2.33 x 3.70. The left testicle demonstrates a homogeneous echotexture and normal contour. Normal vascularity and Doppler waveforms are observed in the left testicle.   LEFT EPIDIDYMIS: The left epididymal head measures 1.12 x 1.47 x 1.08 and is within normal limits.  Mildly heterogeneous   Large bilateral inguinal hernias. Small bilateral hydroceles.       Large bilateral inguinal hernias containing fat and bowel.   No evidence of torsion or inflammation. Mild bilateral heterogeneity of the epididymal structures   MACRO: None   Signed by: Oleg Pineda 3/3/2024 7:26 PM Dictation workstation:   [id number]    CT abdomen pelvis w IV contrast    Result Date: 3/3/2024  Interpreted By:  Oleg Pineda, STUDY: CT ABDOMEN PELVIS W IV CONTRAST;  3/3/2024 6:56 pm   INDICATION: Signs/Symptoms:RLQ pain, b/l inguinal hernias w/ increased swelling and pain.   COMPARISON: None.   " ACCESSION NUMBER(S): [id number]   ORDERING CLINICIAN: LILIANA SIEGEL   TECHNIQUE: CT of the abdomen and pelvis was performed.  Standard contiguous axial images were obtained at 3 mm slice thickness through the abdomen and pelvis. Coronal and sagittal reconstructions at 3 mm slice thickness were performed.     FINDINGS: Heart size is enlarged.   Interrogation of the lung base shows multiple pulmonary nodules including a right lower lobe nodule measuring 1.6 cm. Findings compatible pulmonary metastasis. Nodules seen in the right middle lobe as well as left lower lobe.   Multiple hepatic metastasis are seen. Reference 5 cm lesion at the dome of the liver. 3.2 cm lesion seen in the inferior right hepatic lobe   Greater than 10 lesions detected. No hydronephrosis.       Retrocrural adenopathy is seen. Reference 1.4 retroperitoneal adenopathy seen in the para-aortic region also metastatic appearing vascular calcification. Gallstones. No biliary duct dilatation. Moderate retained stool in the colon. There is left upper quadrant 2.5 cm soft tissue lesion adjacent to the splenic flexure. Wall thickening is seen of the colon.. Features suspicious for underlying colonic neoplasm.   Prostate gland is enlarged. It deforms the bladder base. Left hip arthroplasty changes. Bilateral fat fat and bowel containing inguinal hernias which are fairly large. No evidence of obstruction. Right inguinal hernia measures 8.4 cm in left measures 8 8.7 cm in width.       Multilevel degenerative disc disease is seen.       Findings compatible with widespread metastatic disease including in the lungs  with numerous hepatic metastatic disease also retroperitoneal and retrocrural adenopathy is seen. Left upper quadrant soft tissue mass seen between the stomach in the liver. Findings favor metastatic colon carcinoma given the appearance. Correlation with any known colonoscopy results advised. Robust vascular calcification.   Large bilateral fat and  bowel containing inguinal hernias   MACRO: None   Signed by: Oleg Pineda 3/3/2024 7:22 PM Dictation workstation:   [id number]    XR chest 2 views    Result Date: 3/3/2024  Interpreted By:  Kaleb Wen, STUDY: XR CHEST 2 VIEWS;  3/3/2024 6:01 pm   INDICATION: Signs/Symptoms:CP.   COMPARISON: Radiographs of the chest dated 06/20/2023;   ACCESSION NUMBER(S): [id number]   ORDERING CLINICIAN: LILIANA SIEGEL   FINDINGS: PA and lateral radiographs of the chest were provided.   Single left lead pacemaker/AICD is similar in appearance to prior exam.   CARDIOMEDIASTINAL SILHOUETTE: Cardiomediastinal silhouette is mildly enlarged, similar in appearance to prior exam.   LUNGS: Somewhat low lung volumes are present in the lungs bilaterally with bibasilar atelectasis. There is suggestion of new nodular lesion is seen in the right lung compared to prior examination on 06/20/2023, incompletely characterized. No sizable pleural effusion or consolidation is evident.   ABDOMEN: No remarkable upper abdominal findings.   BONES: No acute osseous changes.       Somewhat low lung volumes in the lungs bilaterally with bibasilar atelectasis, without evidence of new consolidation or sizable pleural effusion, although there are new nodular opacities is present in the right lung, incompletely characterized on current exam. Consider dedicated CT of the chest for further evaluation.   MACRO: None   Signed by: Kaleb Wen 3/3/2024 6:31 PM Dictation workstation:   [id number]       Assessment/Plan   Principal Problem:    Metastatic malignant neoplasm, unspecified site (CMS/HCC)    # Chest pain  # ICD Shock, suspected  # Abdominal pain  # Suspected metastatic malignancy to lung and liver, unknown primaryh  # HTN  # HFrEF 20-25%  # Cardiac amyloidosis  # A-fib on amio and Eliquis  # SSS s/p ICD    Monitor on telemetry.  Check magnesium.  Consulted EP for suspected ICD shock.  Discussed with patient and his daughter at  bedside that there is quite a few abnormalities seen on the CT scan with lesions to lung and liver with lymph nodes involved.  This is highly concerning for malignancy.    Will add CEA, AFP, .  Discussed that patient will need planning for biopsy to establish a diagnosis, and as he is on Eliquis this will need to be planned out.  Pain control for the abdominal pain.  Continue amiodarone, Metoprolol, Farxiga, Entresto, Tafamidis.  Holding Torsemide for now, BP borderline low while in ED.         I spent 55 minutes in the professional and overall care of this patient.      Jimmie Pitts, DO

## 2024-03-04 NOTE — CONSULTS
Department of Internal Medicine  Gastroenterology  Consult note      Reason for Consult: Abdominal pain, suspected metastatic cancer with colon primary    Chief Complaint: Abdominal pain    History Obtained from: chart review and patient reports    History of Present Illness      The patient is a 78 y.o. male with signficant past medical history of SSS s/p ICD, on Eliquis, cardiac amyloidosis, HFrEF, hypertension who presents for abdominal pain.     He states he has been having right lower quadrant abdominal pain that has improved since he has been here.  He describes it as sore.  He denies any radiation or aggravating factors.  It does improve after a bowel movement.  He is also having some groin pain which feels sore.  He denies any constipation, diarrhea, or hematochezia.  He states he has been noticing some black stools but none that are black and tarry.  He has had a change in bowel habits and used to go every 2 to 3 days and is now going less frequency.  He has to take laxatives and stool softeners to allow his bowels move.    He endorses some mild nausea without vomiting.  He denies any odynophagia or dysphagia.  He does endorse some acid reflux occasionally takes Tums as needed.  He cannot better quantify how often he needs Tums as he states its only when he eats things he knows he should not be.    He denies any use of tobacco or recreational drugs.  He drinks alcohol less than monthly.  He is on a blood thinner Eliquis and last dose was last night 3 3:24 PM.  He denies use of daily NSAIDs or Tylenol.    He states his last colonoscopy was 8 to 10 years and believes it was done here at [facility].  He was told he had some polyps removed but does not recall being told he needed to go back for another check.    No history of abdominal surgeries      Allergies  Patient has no known allergies.    Current Medication    Current Facility-Administered Medications:     acetaminophen (Tylenol) tablet 650 mg, 650 mg, oral,  q4h PRN, Jimmie Pitts, DO    amiodarone (Pacerone) tablet 200 mg, 200 mg, oral, Daily, Jimmie Pitts,     apixaban (Eliquis) tablet 5 mg, 5 mg, oral, BID, Jimmie Pitts, DO, 5 mg at 03/03/24 2307    dapagliflozin propanediol (Farxiga) tablet 10 mg, 10 mg, oral, Daily, Jimmie Pitts,     magnesium oxide (Mag-Ox) tablet 400 mg, 400 mg, oral, Daily, Jimmie Pitts,     melatonin tablet 3 mg, 3 mg, oral, Daily, Jimmie Pitts, DO, 3 mg at 03/03/24 2308    metoprolol succinate XL (Toprol-XL) 24 hr tablet 50 mg, 50 mg, oral, Daily, Jimmie Pitts DO    nitroglycerin (Nitrostat) SL tablet 0.4 mg, 0.4 mg, sublingual, q5 min PRN, Jimmie Pitts DO    ondansetron (Zofran) injection 4 mg, 4 mg, intravenous, q8h PRN, Jimmie Pitts DO    oxybutynin (Ditropan) tablet 5 mg, 5 mg, oral, BID, Jimmie Pitts DO, 5 mg at 03/03/24 2308    oxyCODONE (Roxicodone) immediate release tablet 5 mg, 5 mg, oral, q6h PRN, Jimmie Pitts,     oxyCODONE-acetaminophen (Percocet) 5-325 mg per tablet 2 tablet, 2 tablet, oral, q8h PRN, Jimmie Pitts DO    polyethylene glycol (Glycolax, Miralax) packet 17 g, 17 g, oral, Daily, Jimmie Pitts,     polyethylene glycol (Glycolax, Miralax) packet 17 g, 17 g, oral, Daily PRN, Jimmie Pitts DO    [Held by provider] sacubitriL-valsartan (Entresto) 24-26 mg per tablet 1 tablet, 1 tablet, oral, BID, Jimmie Pitts, , 1 tablet at 03/03/24 2309    tafamidis (Vyndamax) capsule 61 mg, 61 mg, oral, Daily, Jimmie A Hong, DO    Current Outpatient Medications:     acetaminophen (Tylenol) 325 mg tablet, Take 2 tablets (650 mg) by mouth every 4 hours if needed. For pain, Disp: , Rfl:     amiodarone (Pacerone) 400 mg tablet, Take 0.5 tablets (200 mg) by mouth once daily., Disp: , Rfl:     apixaban (Eliquis) 5 mg tablet, Take 1 tablet (5 mg) by mouth 2 times a day., Disp: 60 tablet, Rfl: 11    Entresto 24-26 mg tablet, Take by mouth twice a day., Disp: , Rfl:     Farxiga 10 mg, Take 1 tablet (10 mg) by mouth once daily., Disp: 90 tablet, Rfl:  3    magnesium oxide (Mag-Ox) 400 mg (241.3 mg magnesium) tablet, Take 1 tablet (400 mg) by mouth once daily., Disp: , Rfl:     metoprolol succinate XL (Toprol-XL) 50 mg 24 hr tablet, Take 1 tablet (50 mg) by mouth once daily., Disp: , Rfl:     nitroglycerin (Nitrostat) 0.4 mg SL tablet, Place 1 tablet (0.4 mg) under the tongue every 5 minutes if needed., Disp: , Rfl:     oxybutynin XL (Ditropan-XL) 10 mg 24 hr tablet, Take 1 tablet (10 mg) by mouth once daily., Disp: , Rfl:     polyethylene glycol (Glycolax, Miralax) 17 gram/dose powder, Take 17 g by mouth once daily., Disp: , Rfl:     torsemide (Demadex) 20 mg tablet, Take 1 tablet (20 mg) by mouth once daily., Disp: 90 tablet, Rfl: 3    Vyndamax 61 mg capsule, Take 1 tablet by mouth once daily., Disp: , Rfl:     Past Medical History  Active Ambulatory Problems     Diagnosis Date Noted    Acquired hammer toes of both feet 02/20/2018    Arthritis 06/29/2023    Athscl heart disease of native coronary artery w/o ang pctrs 06/29/2023    Benign essential HTN 06/28/2013    Cardiac amyloidosis (CMS/HCC) 06/29/2023    Chronic systolic (congestive) heart failure (CMS/HCC) 06/29/2023    Hypercholesterolemia 06/29/2023    Paroxysmal atrial fibrillation (CMS/HCC) 06/29/2023    Thoracic aortic aneurysm (CMS/HCC) 06/29/2023    Ventricular tachycardia (CMS/HCC) 06/29/2023    Varicose veins of both lower extremities 09/08/2023    Bilateral inguinal hernia 09/08/2023    Deformity of metatarsal 02/20/2018    Disability of walking 12/12/2017    Foot callus 10/17/2019    Foot pain 10/10/2017    Ingrowing nail 10/10/2017    Lower leg edema 10/17/2019    Onychomycosis 10/10/2017    Pain of toe of left foot 10/17/2019    Peripheral arterial occlusive disease (CMS/HCC) 10/17/2019    Dilated cardiomyopathy (CMS/HCC) 09/08/2023    Class 1 obesity due to excess calories with body mass index (BMI) of 32.0 to 32.9 in adult 09/08/2023    Cellulitis of right lower extremity 10/24/2023      Resolved Ambulatory Problems     Diagnosis Date Noted    Chronic systolic dysfunction of left ventricle 03/17/2021    Peripheral vascular disease (CMS/HCC) 12/12/2017    Asymptomatic varicose veins 09/08/2023    Inguinal hernia 09/08/2023    Atrial fibrillation (CMS/HCC) 09/08/2023    Cardiomyopathy (CMS/HCC) 09/08/2023     Past Medical History:   Diagnosis Date    Other specified health status        Past Surgical History  Past Surgical History:   Procedure Laterality Date    KNEE SURGERY  06/21/2021    Knee Surgery    OTHER SURGICAL HISTORY  06/21/2021    Cardiac catheterization    TOTAL HIP ARTHROPLASTY  06/21/2021    Total Hip Replacement       Family History  Family History   Problem Relation Name Age of Onset    Other (cardiac disorder) Mother      Coronary artery disease Mother      Other (cardiac disorder) Father      Coronary artery disease Father      Hypertension Father      Heart attack Father       No family history of stomach or colon cancer    Social History  TOBACCO:  reports that he has never smoked. He has never used smokeless tobacco.  ETOH:  has no history on file for alcohol use.  DRUGS:  has no history on file for drug use.  MARITAL STATUS:   OCCUPATION:    Review of Systems  Review of Systems   Constitutional:  Positive for appetite change and unexpected weight change (5-7lb). Negative for chills and fever.   HENT:  Negative for mouth sores, sore throat and trouble swallowing.    Eyes:  Negative for pain and visual disturbance.   Respiratory:  Positive for cough and shortness of breath (TELLEZ). Negative for wheezing.    Cardiovascular:  Negative for chest pain.   Genitourinary:  Negative for decreased urine volume, dysuria and hematuria.   Musculoskeletal:  Negative for arthralgias, joint swelling and myalgias.   Skin:  Negative for pallor and rash.   Neurological:  Negative for dizziness, weakness, numbness and headaches.   Psychiatric/Behavioral:  Negative for confusion.        PHYSICAL  "EXAM  VS: BP 93/50 (BP Location: Left arm)   Pulse 76   Temp 37.7 °C (99.9 °F) (Temporal)   Resp 16   Ht 1.702 m (5' 7\")   Wt 82.1 kg (181 lb)   SpO2 95%   BMI 28.35 kg/m²  Body mass index is 28.35 kg/m².  Physical Exam  Constitutional:       General: He is not in acute distress.     Appearance: Normal appearance.   HENT:      Head: Normocephalic and atraumatic.      Mouth/Throat:      Mouth: Mucous membranes are moist.      Pharynx: Oropharynx is clear.   Eyes:      General: No scleral icterus.     Extraocular Movements: Extraocular movements intact.      Conjunctiva/sclera: Conjunctivae normal.      Pupils: Pupils are equal, round, and reactive to light.   Cardiovascular:      Rate and Rhythm: Normal rate and regular rhythm.      Heart sounds: No murmur heard.  Pulmonary:      Effort: Pulmonary effort is normal.      Breath sounds: No wheezing or rhonchi.   Abdominal:      General: Bowel sounds are normal. There is no distension.      Palpations: Abdomen is soft. There is no mass.      Tenderness: There is abdominal tenderness (rlq).      Hernia: No hernia is present.   Musculoskeletal:         General: No swelling or deformity.   Skin:     General: Skin is warm.      Coloration: Skin is not jaundiced.   Neurological:      General: No focal deficit present.      Mental Status: He is alert and oriented to person, place, and time.   Psychiatric:         Mood and Affect: Mood normal.          DATA  Recent blood work and relevant radiology studies were reviewed and discussed with the patient   Results from last 7 days   Lab Units 03/03/24  1656   WBC AUTO x10*3/uL 7.3   RBC AUTO x10*6/uL 3.85*   HEMOGLOBIN g/dL 9.7*   HEMATOCRIT % 31.8*   MCV fL 83   MCHC g/dL 30.5*   RDW % 16.1*   PLATELETS AUTO x10*3/uL 293       Results from last 72 hours   Lab Units 03/03/24  1656   SODIUM mmol/L 138   POTASSIUM mmol/L 4.0   CHLORIDE mmol/L 103   CO2 mmol/L 24   BUN mg/dL 25*   CREATININE mg/dL 1.25   CALCIUM mg/dL 9.0 "   PROTEIN TOTAL g/dL 6.8   BILIRUBIN TOTAL mg/dL 1.0   ALK PHOS U/L 308*   AST U/L 33   ALT U/L 29       Results from last 72 hours   Lab Units 03/03/24  1656   INR  1.5*      Latest Reference Range & Units 03/03/24 16:56   Carcinoembryonic AG ug/L 207.5   Cancer AG 19-9 <35.00 U/mL 23,616.53 (H)   Alpha-Fetoprotein 0 - 9 ng/mL <4   (H): Data is abnormally high    RADIOLOGY REVIEW  CT abdomen pelvis with contrast 3/3/2024  IMPRESSION:  Findings compatible with widespread metastatic disease including in the lungs  with numerous hepatic metastatic disease also retroperitoneal and retrocrural adenopathy is seen. Left upper quadrant soft tissue mass seen between the stomach in the liver.  Findings favor metastatic colon carcinoma given the appearance.  Correlation with any known colonoscopy results advised. Robust  vascular calcification.      Large bilateral fat and bowel containing inguinal hernias    ENDOSCOPIC REVIEW  No endoscopy in [facility] or  ambulatory    IMPRESSION/RECOMMENDATIONS    Abnormal CT diffusely - likely metastatic disease in the lung, liver, retroperitoneal, and retrocrural adenopathy.  Soft tissue mass between the stomach and the liver.  Radiologist did find it most concerning for metastatic colon carcinoma.  Last known colonoscopy 7 to 10 years ago with report unavailable  CA 19-9 elevated 23k  Normocytic anemia - hgb (3/3) 9.7, baselines 13  A-fib, SSS, HFrEF, ICD -radiology has been consulted  Anticoagulation Eliquis    PLAN  -Reviewed CT scans with Dr. García, hepatic lesions appear easiest to go after for biopsy as opposed to full colonoscopy.  Will order CT-guided liver biopsy of the lesion.  Would not pursue full colonoscopy at this time unless absolutely necessary.  -Consider holding Eliquis so biopsy can be obtained at this moment unclear if he will do better to go for one of the metastatic lesions or obtain full colonoscopy  -Currently on a general diet  -Continue supportive care per primary  team    Discussed with ABEBE Nolan to follow    (Electronically signed byNita Mendez PA-C on 3/4/2024 at 7:55 AM)  Patient seen and examined.  I obtained an independent history from the patient.  His children were by the bedside.  Apparently this patient has lost some weight  And his appetite is poor.  He noticed the hernias approximately a year ago but was not that much bother but were constipation even though he has been taking Dulcolax now and then.  The exact dose and frequency could not be determined with certainty since he was rather very vague.  Certainly there was no bleeding noted.  He is admitting complaints or the onset of pain in both lower quadrants particular in the right side.  He is not aware that he has become any more constipated than before.  Since admission his abdominal pains have decreased.  He does not feel like eating anything.  There is no family history of colon cancer and I do not believe he had a colonoscopy at any time.  He is a retired mailman.  He has a slight hoarseness of the voice which apparently has been the same before and not changed recently.  No significant deformities noted in the neck.  No tachycardia lungs are clear abdomen shows that palpable firm sigmoid colon is present in the left side.  I am not able to feel a mass in the left upper quadrant area or in the epigastrium as noted on the CT scan.  I am also unable to feel the liver.  He has some tenderness right lower quadrant area however no masses are felt.  He has bilateral inguinal hernias which to some extent reducible.  I reviewed the patient's CT which unfortunately shows multiple lesions there is 1 particular lesion right at the very periphery of the liver which could very be very easily accessed by a liver biopsy.  The patient is certainly constipated as per hard palpable sigmoid colon.  I did give the patient some 40 g of lactulose today.  The patient CEA significantly elevated indicating of most likely a  primary colonic lesion.  I would hold off doing any endoscopy procedure unless the oncology concerns feel that should be the primary focus of workup.  I will await their decision.  This note was created using Dragon dictation technology and unintended errors of omission commission may be present in spite of proofreading      Inpatient consult to Gastroenterology  Consult performed by: Nita Mendez PA-C  Consult ordered by: Jimmie Pitts DO

## 2024-03-05 LAB
ALBUMIN SERPL BCP-MCNC: 3 G/DL (ref 3.4–5)
ANION GAP SERPL CALC-SCNC: 11 MMOL/L (ref 10–20)
BACTERIA UR CULT: NORMAL
BUN SERPL-MCNC: 29 MG/DL (ref 6–23)
CALCIUM SERPL-MCNC: 8.2 MG/DL (ref 8.6–10.3)
CHLORIDE SERPL-SCNC: 105 MMOL/L (ref 98–107)
CO2 SERPL-SCNC: 25 MMOL/L (ref 21–32)
CREAT SERPL-MCNC: 1.42 MG/DL (ref 0.5–1.3)
EGFRCR SERPLBLD CKD-EPI 2021: 50 ML/MIN/1.73M*2
ERYTHROCYTE [DISTWIDTH] IN BLOOD BY AUTOMATED COUNT: 16.3 % (ref 11.5–14.5)
FERRITIN SERPL-MCNC: 35 NG/ML (ref 20–300)
GLUCOSE SERPL-MCNC: 94 MG/DL (ref 74–99)
HCT VFR BLD AUTO: 27.3 % (ref 41–52)
HGB BLD-MCNC: 8 G/DL (ref 13.5–17.5)
IRON SATN MFR SERPL: 6 % (ref 25–45)
IRON SERPL-MCNC: 25 UG/DL (ref 35–150)
MCH RBC QN AUTO: 24.5 PG (ref 26–34)
MCHC RBC AUTO-ENTMCNC: 29.3 G/DL (ref 32–36)
MCV RBC AUTO: 84 FL (ref 80–100)
NRBC BLD-RTO: 0 /100 WBCS (ref 0–0)
PHOSPHATE SERPL-MCNC: 2.9 MG/DL (ref 2.5–4.9)
PLATELET # BLD AUTO: 254 X10*3/UL (ref 150–450)
POTASSIUM SERPL-SCNC: 4.2 MMOL/L (ref 3.5–5.3)
RBC # BLD AUTO: 3.26 X10*6/UL (ref 4.5–5.9)
SODIUM SERPL-SCNC: 137 MMOL/L (ref 136–145)
TIBC SERPL-MCNC: 395 UG/DL (ref 240–445)
UIBC SERPL-MCNC: 370 UG/DL (ref 110–370)
WBC # BLD AUTO: 8.1 X10*3/UL (ref 4.4–11.3)

## 2024-03-05 PROCEDURE — 2500000004 HC RX 250 GENERAL PHARMACY W/ HCPCS (ALT 636 FOR OP/ED): Performed by: INTERNAL MEDICINE

## 2024-03-05 PROCEDURE — 2500000002 HC RX 250 W HCPCS SELF ADMINISTERED DRUGS (ALT 637 FOR MEDICARE OP, ALT 636 FOR OP/ED): Mod: MUE | Performed by: INTERNAL MEDICINE

## 2024-03-05 PROCEDURE — 83540 ASSAY OF IRON: CPT

## 2024-03-05 PROCEDURE — 99232 SBSQ HOSP IP/OBS MODERATE 35: CPT | Performed by: INTERNAL MEDICINE

## 2024-03-05 PROCEDURE — 1200000002 HC GENERAL ROOM WITH TELEMETRY DAILY

## 2024-03-05 PROCEDURE — 2500000001 HC RX 250 WO HCPCS SELF ADMINISTERED DRUGS (ALT 637 FOR MEDICARE OP): Performed by: INTERNAL MEDICINE

## 2024-03-05 PROCEDURE — 99233 SBSQ HOSP IP/OBS HIGH 50: CPT

## 2024-03-05 PROCEDURE — 83550 IRON BINDING TEST: CPT

## 2024-03-05 PROCEDURE — 85027 COMPLETE CBC AUTOMATED: CPT

## 2024-03-05 PROCEDURE — 99233 SBSQ HOSP IP/OBS HIGH 50: CPT | Performed by: INTERNAL MEDICINE

## 2024-03-05 PROCEDURE — 99222 1ST HOSP IP/OBS MODERATE 55: CPT

## 2024-03-05 PROCEDURE — 36415 COLL VENOUS BLD VENIPUNCTURE: CPT

## 2024-03-05 PROCEDURE — 2500000002 HC RX 250 W HCPCS SELF ADMINISTERED DRUGS (ALT 637 FOR MEDICARE OP, ALT 636 FOR OP/ED): Performed by: PHYSICIAN ASSISTANT

## 2024-03-05 PROCEDURE — 82728 ASSAY OF FERRITIN: CPT | Mod: PARLAB

## 2024-03-05 PROCEDURE — 80069 RENAL FUNCTION PANEL: CPT

## 2024-03-05 RX ORDER — POLYETHYLENE GLYCOL 3350, SODIUM CHLORIDE, SODIUM BICARBONATE, POTASSIUM CHLORIDE 420; 11.2; 5.72; 1.48 G/4L; G/4L; G/4L; G/4L
4000 POWDER, FOR SOLUTION ORAL ONCE
Status: COMPLETED | OUTPATIENT
Start: 2024-03-05 | End: 2024-03-05

## 2024-03-05 RX ORDER — TALC
3 POWDER (GRAM) TOPICAL NIGHTLY PRN
Status: DISCONTINUED | OUTPATIENT
Start: 2024-03-05 | End: 2024-03-07 | Stop reason: HOSPADM

## 2024-03-05 RX ORDER — LACTULOSE 10 G/15ML
60 SOLUTION ORAL DAILY
Status: DISCONTINUED | OUTPATIENT
Start: 2024-03-05 | End: 2024-03-07 | Stop reason: HOSPADM

## 2024-03-05 RX ORDER — POLYETHYLENE GLYCOL 3350 17 G/17G
238 POWDER, FOR SOLUTION ORAL ONCE
Status: COMPLETED | OUTPATIENT
Start: 2024-03-05 | End: 2024-03-05

## 2024-03-05 RX ADMIN — ACETAMINOPHEN 650 MG: 325 TABLET ORAL at 00:40

## 2024-03-05 RX ADMIN — POLYETHYLENE GLYCOL 3350, SODIUM CHLORIDE, SODIUM BICARBONATE AND POTASSIUM CHLORIDE WITH LEMON FLAVOR 4000 ML: 420; 11.2; 5.72; 1.48 POWDER, FOR SOLUTION ORAL at 17:40

## 2024-03-05 RX ADMIN — POLYETHYLENE GLYCOL 3350 17 G: 17 POWDER, FOR SOLUTION ORAL at 09:11

## 2024-03-05 RX ADMIN — MAGNESIUM OXIDE TAB 400 MG (241.3 MG ELEMENTAL MG) 400 MG: 400 (241.3 MG) TAB at 09:11

## 2024-03-05 RX ADMIN — DAPAGLIFLOZIN 10 MG: 10 TABLET, FILM COATED ORAL at 12:43

## 2024-03-05 RX ADMIN — PANTOPRAZOLE SODIUM 40 MG: 40 TABLET, DELAYED RELEASE ORAL at 06:35

## 2024-03-05 RX ADMIN — IRON SUCROSE 200 MG: 20 INJECTION, SOLUTION INTRAVENOUS at 21:00

## 2024-03-05 RX ADMIN — OXYBUTYNIN CHLORIDE 5 MG: 5 TABLET ORAL at 09:11

## 2024-03-05 RX ADMIN — POLYETHYLENE GLYCOL 3350 17 G: 17 POWDER, FOR SOLUTION ORAL at 17:41

## 2024-03-05 RX ADMIN — LACTULOSE 60 G: 20 SOLUTION ORAL at 11:19

## 2024-03-05 RX ADMIN — POLYETHYLENE GLYCOL 3350 17 G: 17 POWDER, FOR SOLUTION ORAL at 17:40

## 2024-03-05 RX ADMIN — OXYBUTYNIN CHLORIDE 5 MG: 5 TABLET ORAL at 20:39

## 2024-03-05 RX ADMIN — POLYETHYLENE GLYCOL 3350 238 G: 17 POWDER, FOR SOLUTION ORAL at 17:54

## 2024-03-05 ASSESSMENT — COGNITIVE AND FUNCTIONAL STATUS - GENERAL
STANDING UP FROM CHAIR USING ARMS: A LITTLE
WALKING IN HOSPITAL ROOM: A LITTLE
DAILY ACTIVITIY SCORE: 22
CLIMB 3 TO 5 STEPS WITH RAILING: A LOT
MOBILITY SCORE: 19
TOILETING: A LITTLE
MOVING TO AND FROM BED TO CHAIR: A LITTLE
HELP NEEDED FOR BATHING: A LITTLE

## 2024-03-05 ASSESSMENT — PAIN - FUNCTIONAL ASSESSMENT: PAIN_FUNCTIONAL_ASSESSMENT: 0-10

## 2024-03-05 ASSESSMENT — PAIN SCALES - GENERAL: PAINLEVEL_OUTOF10: 3

## 2024-03-05 NOTE — PROGRESS NOTES
"Oleg Yang is a 78 y.o. male on day 2 of admission presenting with Metastatic malignant neoplasm, unspecified site (CMS/HCC).    Subjective   Patient had no specific complaints.  Specifically denies any shortness of breath he is awake pain in the left side which has been told by cardiology to be local in nature.       Objective     He is alert and oriented x 3 irregular heartbeat suggestive of atrial fibrillation lungs are clear abdomen is prominent bilateral hernias noted which are reducible    Last Recorded Vitals  Blood pressure 94/52, pulse 87, temperature 36.2 °C (97.2 °F), resp. rate 18, height 1.702 m (5' 7\"), weight 82.1 kg (181 lb), SpO2 97 %.  Intake/Output last 3 Shifts:  I/O last 3 completed shifts:  In: 250 (3 mL/kg) [IV Piggyback:250]  Out: 500 (6.1 mL/kg) [Urine:500 (0.2 mL/kg/hr)]  Weight: 82.1 kg     Relevant Results      Results for orders placed or performed during the hospital encounter of 03/03/24 (from the past 24 hour(s))   CBC   Result Value Ref Range    WBC 8.1 4.4 - 11.3 x10*3/uL    nRBC 0.0 0.0 - 0.0 /100 WBCs    RBC 3.26 (L) 4.50 - 5.90 x10*6/uL    Hemoglobin 8.0 (L) 13.5 - 17.5 g/dL    Hematocrit 27.3 (L) 41.0 - 52.0 %    MCV 84 80 - 100 fL    MCH 24.5 (L) 26.0 - 34.0 pg    MCHC 29.3 (L) 32.0 - 36.0 g/dL    RDW 16.3 (H) 11.5 - 14.5 %    Platelets 254 150 - 450 x10*3/uL   Renal Function Panel   Result Value Ref Range    Glucose 94 74 - 99 mg/dL    Sodium 137 136 - 145 mmol/L    Potassium 4.2 3.5 - 5.3 mmol/L    Chloride 105 98 - 107 mmol/L    Bicarbonate 25 21 - 32 mmol/L    Anion Gap 11 10 - 20 mmol/L    Urea Nitrogen 29 (H) 6 - 23 mg/dL    Creatinine 1.42 (H) 0.50 - 1.30 mg/dL    eGFR 50 (L) >60 mL/min/1.73m*2    Calcium 8.2 (L) 8.6 - 10.3 mg/dL    Phosphorus 2.9 2.5 - 4.9 mg/dL    Albumin 3.0 (L) 3.4 - 5.0 g/dL   Iron and TIBC   Result Value Ref Range    Iron 25 (L) 35 - 150 ug/dL    UIBC 370 110 - 370 ug/dL    TIBC 395 240 - 445 ug/dL    % Saturation 6 (L) 25 - 45 %   Ferritin   Result " Value Ref Range    Ferritin        ECG 12 lead    Result Date: 3/4/2024  Sinus rhythm with AV dissociation and Accelerated Junctional rhythm with Premature supraventricular complexes and with occasional Premature ventricular complexes Left axis deviation Cannot rule out Inferior infarct (cited on or before 21-JUN-2023) Prolonged QT Abnormal ECG When compared with ECG of 21-JUN-2023 13:18, Junctional rhythm has replaced Sinus rhythm Vent. rate has increased BY  38 BPM Incomplete right bundle branch block is no longer Present Criteria for Septal infarct are no longer Present    US scrotum w doppler    Result Date: 3/3/2024  Interpreted By:  Oleg Pineda, STUDY: US SCROTUM WITH DOPPLER;  3/3/2024 6:28 pm   INDICATION: Signs/Symptoms:b/l scrotal swelling and pain.   COMPARISON: None.   ACCESSION NUMBER(S): [id number]   ORDERING CLINICIAN: LILIANA SIEGEL   TECHNIQUE: Multiple ultrasonographic images of scrotum and tested were obtained.   FINDINGS: RIGHT HEMISCROTUM:   RIGHT TESTICLE: The right testicle measures 3.17 x 2.04x 3.68. The right testicle demonstrates a homogeneous echotexture and normal contour. Normal vascularity and Doppler waveforms are observed in the right testicle. 7 mm right testicular cyst   RIGHT EPIDIDYMIS: The right epididymal head measures .89 x 1.21 x 1.06 and is within normal limits. Mildly heterogeneous   LEFT HEMISCROTUM:   LEFT TESTICLE: The left testicle measures 2.63 x 2.33 x 3.70. The left testicle demonstrates a homogeneous echotexture and normal contour. Normal vascularity and Doppler waveforms are observed in the left testicle.   LEFT EPIDIDYMIS: The left epididymal head measures 1.12 x 1.47 x 1.08 and is within normal limits.  Mildly heterogeneous   Large bilateral inguinal hernias. Small bilateral hydroceles.       Large bilateral inguinal hernias containing fat and bowel.   No evidence of torsion or inflammation. Mild bilateral heterogeneity of the epididymal structures   MACRO: None    Signed by: Oleg Pineda 3/3/2024 7:26 PM Dictation workstation:   [id number]    CT abdomen pelvis w IV contrast    Result Date: 3/3/2024  Interpreted By:  Oleg Pineda, STUDY: CT ABDOMEN PELVIS W IV CONTRAST;  3/3/2024 6:56 pm   INDICATION: Signs/Symptoms:RLQ pain, b/l inguinal hernias w/ increased swelling and pain.   COMPARISON: None.   ACCESSION NUMBER(S): [id number]   ORDERING CLINICIAN: LILIANA SIEGEL   TECHNIQUE: CT of the abdomen and pelvis was performed.  Standard contiguous axial images were obtained at 3 mm slice thickness through the abdomen and pelvis. Coronal and sagittal reconstructions at 3 mm slice thickness were performed.     FINDINGS: Heart size is enlarged.   Interrogation of the lung base shows multiple pulmonary nodules including a right lower lobe nodule measuring 1.6 cm. Findings compatible pulmonary metastasis. Nodules seen in the right middle lobe as well as left lower lobe.   Multiple hepatic metastasis are seen. Reference 5 cm lesion at the dome of the liver. 3.2 cm lesion seen in the inferior right hepatic lobe   Greater than 10 lesions detected. No hydronephrosis.       Retrocrural adenopathy is seen. Reference 1.4 retroperitoneal adenopathy seen in the para-aortic region also metastatic appearing vascular calcification. Gallstones. No biliary duct dilatation. Moderate retained stool in the colon. There is left upper quadrant 2.5 cm soft tissue lesion adjacent to the splenic flexure. Wall thickening is seen of the colon.. Features suspicious for underlying colonic neoplasm.   Prostate gland is enlarged. It deforms the bladder base. Left hip arthroplasty changes. Bilateral fat fat and bowel containing inguinal hernias which are fairly large. No evidence of obstruction. Right inguinal hernia measures 8.4 cm in left measures 8 8.7 cm in width.       Multilevel degenerative disc disease is seen.       Findings compatible with widespread metastatic disease including in the lungs   with numerous hepatic metastatic disease also retroperitoneal and retrocrural adenopathy is seen. Left upper quadrant soft tissue mass seen between the stomach in the liver. Findings favor metastatic colon carcinoma given the appearance. Correlation with any known colonoscopy results advised. Robust vascular calcification.   Large bilateral fat and bowel containing inguinal hernias   MACRO: None   Signed by: Oleg Pineda 3/3/2024 7:22 PM Dictation workstation:   [id number]    XR chest 2 views    Result Date: 3/3/2024  Interpreted By:  Kaleb Wen, STUDY: XR CHEST 2 VIEWS;  3/3/2024 6:01 pm   INDICATION: Signs/Symptoms:CP.   COMPARISON: Radiographs of the chest dated 06/20/2023;   ACCESSION NUMBER(S): [id number]   ORDERING CLINICIAN: LILIANA SIEGEL   FINDINGS: PA and lateral radiographs of the chest were provided.   Single left lead pacemaker/AICD is similar in appearance to prior exam.   CARDIOMEDIASTINAL SILHOUETTE: Cardiomediastinal silhouette is mildly enlarged, similar in appearance to prior exam.   LUNGS: Somewhat low lung volumes are present in the lungs bilaterally with bibasilar atelectasis. There is suggestion of new nodular lesion is seen in the right lung compared to prior examination on 06/20/2023, incompletely characterized. No sizable pleural effusion or consolidation is evident.   ABDOMEN: No remarkable upper abdominal findings.   BONES: No acute osseous changes.       Somewhat low lung volumes in the lungs bilaterally with bibasilar atelectasis, without evidence of new consolidation or sizable pleural effusion, although there are new nodular opacities is present in the right lung, incompletely characterized on current exam. Consider dedicated CT of the chest for further evaluation.   MACRO: None   Signed by: Kaleb Wen 3/3/2024 6:31 PM Dictation workstation:   [id number]    * Cannot find OR log *  Last relevant procedure:                          Assessment/Plan   Principal  Problem:    Metastatic malignant neoplasm, unspecified site (CMS/HCC)  He had gone back-and-forth with this patient regarding which we are to proceed and evaluating him.  He is CT scans clearly show what appears to be like metastatic lesions in addition to a lesion in the area of the splenic flexure.  However the patient really came with complaints of pain in both lower quadrants which he still has.  Initially we thought a liver biopsy would be easier to get a diagnosis however since he has a lot of symptomatic colonic complaints I believe it would be helpful to also have an EGD to rule out any concomitant lesions in the colon.  This is discussed with Dr. Alexandra the oncologist.  Therefore we have proceeded to do if colonoscopy and since he would be under sedation will also do an EGD on the patient.  The patient has a reduced ejection fraction of 25% on the echocardiogram.  Potassium is normal.  He gives a history that he has had a previous colonoscopy done 10 years ago some polyps were noted the examination was uncomfortable for him because of poor sedation.  Lab values now show that he is significantly iron deficient.  We have started him on IV venofer 200 mg IV and will give 4 doses.  An informed consent discussion with the patient and and is agreeable to have it done.  This note was created using Dragon dictation technology and unintended errors of omission commission may be present in spite of proofreading.            I spent  minutes in the professional and overall care of this patient.      Paras García MD

## 2024-03-05 NOTE — PROGRESS NOTES
History Of Present Illness:      The patient continues to have brief episodes of chest pain which she describes as a pressure sensation over the left anterior chest.  He is able to reproduce the pain with palpation over the left breast region.  No pain at the ICD site.    Past medical history:    Cardiac amyloidosis  HFrEF  Hypertension  ICD insertion  Atrial fibrillation  Ventricular tachycardia    Review of Systems  Other review of systems negative     Last Recorded Vitals:      3/4/2024     5:46 PM 3/4/2024     9:59 PM 3/5/2024    12:33 AM 3/5/2024    12:40 AM 3/5/2024     5:09 AM 3/5/2024     8:55 AM 3/5/2024    12:04 PM   Vitals   Systolic 112 96 103  85 84 94   Diastolic 50 52 56  52 50 52   Heart Rate 101 85 88  82 70 87   Temp 37.3 °C (99.1 °F) 36.9 °C (98.4 °F) 37.8 °C (100 °F) 38.1 °C (100.6 °F) 36.7 °C (98.1 °F)  36.2 °C (97.2 °F)   Resp     18 18           Allergies:  Patient has no known allergies.    Outpatient Medications:  Current Outpatient Medications   Medication Instructions    acetaminophen (TYLENOL) 650 mg, oral, Every 4 hours PRN, For pain    amiodarone (Pacerone) 400 mg tablet Take 0.5 tablets (200 mg) by mouth once daily.    apixaban (ELIQUIS) 5 mg, oral, 2 times daily    Entresto 24-26 mg tablet 0.5 tablets, oral, Daily    Farxiga 10 mg, oral, Daily    magnesium oxide (Mag-Ox) 400 mg (241.3 mg magnesium) tablet 1 tablet, oral, Daily    metoprolol succinate XL (Toprol-XL) 50 mg 24 hr tablet 1 tablet, oral, Daily    nitroglycerin (NITROSTAT) 0.4 mg, sublingual, Every 5 min PRN    oxybutynin XL (Ditropan-XL) 10 mg 24 hr tablet 1 tablet, oral, Daily    polyethylene glycol (GLYCOLAX, MIRALAX) 17 g, oral, Daily PRN    torsemide (DEMADEX) 20 mg, oral, Daily    Vyndamax 61 mg capsule 1 tablet, oral, Daily       Physical Exam:    General Appearance:  Alert, oriented, no distress  Skin:  Warm and dry  Head and Neck:  No elevation of JVP, no carotid bruits  Cardiac Exam:  Rhythm is regular, S1 and S2  are normal, no murmur S3 or S4  Lungs:  Clear to auscultation  Extremities:  no edema  Neurologic:  No focal deficits  Psychiatric:  Appropriate mood and behavior         Cardiology Tests:  I have personally review the diagnostic cardiac testing and my interpretation is as follows:      Echocardiogram, ejection fraction 25%.    Assessment/Plan     1.  Atrial fibrillation: Anticoagulation is currently on hold and the patient is undergoing a GI evaluation of the anemia.    2.  HFrEF: Ejection fraction in the range of 25%.  He has been hypotensive at times and Entresto was held today.    James C Ramicone, DO

## 2024-03-05 NOTE — CONSULTS
Hematology/Oncology Initial Consult Note    Patient Name: Oleg Yang   YOB: 1945    Subjective:  Oleg Yang is a 78 y.o. male with past medical history of ASHD, HTN, HFrEF (with ICD; EF 20-25%), Afib (on eliquis) who presented to [facility] ED on 3/3/24 with chest and abdominal pain. On 3/02/24 morning, patient mentions waking up around 1am to a sensation of being shocked. His legs jolted up and then he had some chest pain. He also endorses severe abdominal pain which he describes as being 10/10 and diffuse.    In the ED, vitals were stable. Work-up significant for , trop 62. CTAP showed widespread metastatic disease to lungs, liver, and retroperitoneal and retrocrural adenopathy, suspicious for metastatic colon cancer. Heme/Onc was consulted for evaluation of suspected metastatic colon cancer.    On evaluation today, vitals significant for SBP in the 80s. Interval labs notable for Cr 1.42 (from 1.32; baseline around ~1.2), Hgb 8.0 (baseline ~13), RDW elevated to 16.3. Tumor markers significant for CA 19-9 elevated to 23,616, .5, and AFP <4. CTAP read by Radiologist as showing findings compatible with widespread metastatic disease including in the lungs  with numerous hepatic metastatic disease also retroperitoneal and retrocrural adenopathy is seen. Left upper quadrant soft tissue mass seen between the stomach in the liver. Findings favor metastatic colon carcinoma given the appearance. GI has been consulted as well, and advised IR biopsy of hepatic lesion. Patient mentions that he has no family history of cancer. He mentions that he has never smoked as well. He endorses some weight loss of around 10 pounds over the last year; however, he mentions that his weight fluctuates a lot due to his heart failure. He mentions that his symptoms began around Friday (03/02/24) with generalized vague abdominal pain and nausea. He has not been constipated and mentions that his most recent bowel movement had  "no signs of blood and was brown.    A 10 point ROS was completed and is negative expect as stated in HPI.     Past Medical History: As stated above    Past Surgical History: As stated above; knee surgery (06/2021), total hip replacement (06/2021)    Social History: No tobacco use, social drinker (1 drink/week), no illicit drug use,     Family History: Mother and Father - Heart Disease;     Objective:    BP 85/52 (Patient Position: Sitting)   Pulse 82   Temp 36.7 °C (98.1 °F)   Resp 18   Ht 1.702 m (5' 7\")   Wt 82.1 kg (181 lb)   SpO2 96%   BMI 28.35 kg/m²     Physical Exam:  GEN: conversant, NAD  HEENT: PERRL, EOMI, MMM OP clear, TMs clear bilaterally  NECK: supple, no cervical LAD, no carotid bruits  CV: S1, S2, regular, no murmur  PULM: CTAB  ABD: soft, NT, ND, BS+, mild tenderness to palpation in lower quadrants.  EXT: no LE edema  NEURO: no gross focal deficits  PSYCH: appropriate affect     Assessment/Plan:  Oleg Yang is a 78 y.o. male with past medical history of ASHD, HTN, HFrEF (with ICD; EF 20-25%), Afib (on eliquis) [facility] with chest and abdominal pain. Heme/Onc was consulted for evaluation of suspected metastatic colon cancer.    Suspected Metastatic Cancer suspect colon vs pancreatic   Normocytic Anemia in setting of above, suspect ABHINAV    Agree with prompt biopsy of liver lesion. Tumor markers suggestive of pancreatic vs colonic primary site, biopsy of metastatic site would be ideal. Last dose of Eliquis was on 03/03/24. Patient will need outpatient follow-up with Heme/Onc.  Normocytic anemia likely secondary to ABHINAV from cancer and slow bleed. Will get iron studies.    I have reviewed and interpreted all lab test imaging studies and documentations from other healthcare providers.    This is a preliminary note, please await attending attestation for a finalized plan.    Kd Adorno MD  Hematology/Oncology    "

## 2024-03-05 NOTE — PROGRESS NOTES
Oleg Yang is a 78 y.o. male on day 2 of admission presenting with Metastatic malignant neoplasm, unspecified site (CMS/HCC).      Subjective   Patient seen and examined at bedside.  Patient feels okay.  No acute issues overnight.  Sitting in bed.       Objective     Last Recorded Vitals  BP 94/52   Pulse 87   Temp 36.2 °C (97.2 °F)   Resp 18   Wt 82.1 kg (181 lb)   SpO2 97%   Intake/Output last 3 Shifts:    Intake/Output Summary (Last 24 hours) at 3/5/2024 1433  Last data filed at 3/5/2024 0514  Gross per 24 hour   Intake --   Output 500 ml   Net -500 ml         Admission Weight  Weight: 82.1 kg (181 lb) (03/03/24 1647)    Daily Weight  03/03/24 : 82.1 kg (181 lb)    Image Results  ECG 12 lead  Sinus rhythm with AV dissociation and Accelerated Junctional rhythm with Premature supraventricular complexes and with occasional Premature  ventricular complexes  Left axis deviation  Cannot rule out Inferior infarct (cited on or before 21-JUN-2023)  Prolonged QT  Abnormal ECG  When compared with ECG of 21-JUN-2023 13:18,  Junctional rhythm has replaced Sinus rhythm  Vent. rate has increased BY  38 BPM  Incomplete right bundle branch block is no longer Present  Criteria for Septal infarct are no longer Present      Physical Exam  Physical Exam:  General:  Pleasant and cooperative. No apparent distress.  HEENT:  Normocephalic, atraumatic, mucus membranes moist.   Neck:  Trachea midline.  No JVD.    Chest:  Clear to auscultation bilaterally. No wheezes, rales, or rhonchi.  CV:  Regular rate and rhythm.  Positive S1/S2.  Left-sided implant.  Abdomen: Bowel sounds present in all four quadrants,.  Generalized abdominal tenderness  Extremities:  No lower extremity edema or cyanosis.   Neurological:  AAOx3. No focal deficits.  Skin:  Warm and dry.   Relevant Results  Results for orders placed or performed during the hospital encounter of 03/03/24 (from the past 24 hour(s))   CBC   Result Value Ref Range    WBC 8.1 4.4 - 11.3  x10*3/uL    nRBC 0.0 0.0 - 0.0 /100 WBCs    RBC 3.26 (L) 4.50 - 5.90 x10*6/uL    Hemoglobin 8.0 (L) 13.5 - 17.5 g/dL    Hematocrit 27.3 (L) 41.0 - 52.0 %    MCV 84 80 - 100 fL    MCH 24.5 (L) 26.0 - 34.0 pg    MCHC 29.3 (L) 32.0 - 36.0 g/dL    RDW 16.3 (H) 11.5 - 14.5 %    Platelets 254 150 - 450 x10*3/uL   Renal Function Panel   Result Value Ref Range    Glucose 94 74 - 99 mg/dL    Sodium 137 136 - 145 mmol/L    Potassium 4.2 3.5 - 5.3 mmol/L    Chloride 105 98 - 107 mmol/L    Bicarbonate 25 21 - 32 mmol/L    Anion Gap 11 10 - 20 mmol/L    Urea Nitrogen 29 (H) 6 - 23 mg/dL    Creatinine 1.42 (H) 0.50 - 1.30 mg/dL    eGFR 50 (L) >60 mL/min/1.73m*2    Calcium 8.2 (L) 8.6 - 10.3 mg/dL    Phosphorus 2.9 2.5 - 4.9 mg/dL    Albumin 3.0 (L) 3.4 - 5.0 g/dL   Iron and TIBC   Result Value Ref Range    Iron 25 (L) 35 - 150 ug/dL    UIBC 370 110 - 370 ug/dL    TIBC 395 240 - 445 ug/dL    % Saturation 6 (L) 25 - 45 %   Ferritin   Result Value Ref Range    Ferritin      ECG 12 lead    Result Date: 3/4/2024  Sinus rhythm with AV dissociation and Accelerated Junctional rhythm with Premature supraventricular complexes and with occasional Premature ventricular complexes Left axis deviation Cannot rule out Inferior infarct (cited on or before 21-JUN-2023) Prolonged QT Abnormal ECG When compared with ECG of 21-JUN-2023 13:18, Junctional rhythm has replaced Sinus rhythm Vent. rate has increased BY  38 BPM Incomplete right bundle branch block is no longer Present Criteria for Septal infarct are no longer Present    US scrotum w doppler    Result Date: 3/3/2024  Interpreted By:  Oleg Pineda, STUDY: US SCROTUM WITH DOPPLER;  3/3/2024 6:28 pm   INDICATION: Signs/Symptoms:b/l scrotal swelling and pain.   COMPARISON: None.   ACCESSION NUMBER(S): [id number]   ORDERING CLINICIAN: LILIANA SIEGEL   TECHNIQUE: Multiple ultrasonographic images of scrotum and tested were obtained.   FINDINGS: RIGHT HEMISCROTUM:   RIGHT TESTICLE: The right  testicle measures 3.17 x 2.04x 3.68. The right testicle demonstrates a homogeneous echotexture and normal contour. Normal vascularity and Doppler waveforms are observed in the right testicle. 7 mm right testicular cyst   RIGHT EPIDIDYMIS: The right epididymal head measures .89 x 1.21 x 1.06 and is within normal limits. Mildly heterogeneous   LEFT HEMISCROTUM:   LEFT TESTICLE: The left testicle measures 2.63 x 2.33 x 3.70. The left testicle demonstrates a homogeneous echotexture and normal contour. Normal vascularity and Doppler waveforms are observed in the left testicle.   LEFT EPIDIDYMIS: The left epididymal head measures 1.12 x 1.47 x 1.08 and is within normal limits.  Mildly heterogeneous   Large bilateral inguinal hernias. Small bilateral hydroceles.       Large bilateral inguinal hernias containing fat and bowel.   No evidence of torsion or inflammation. Mild bilateral heterogeneity of the epididymal structures   MACRO: None   Signed by: Oleg Pineda 3/3/2024 7:26 PM Dictation workstation:   [id number]    CT abdomen pelvis w IV contrast    Result Date: 3/3/2024  Interpreted By:  Oleg Pineda, STUDY: CT ABDOMEN PELVIS W IV CONTRAST;  3/3/2024 6:56 pm   INDICATION: Signs/Symptoms:RLQ pain, b/l inguinal hernias w/ increased swelling and pain.   COMPARISON: None.   ACCESSION NUMBER(S): [id number]   ORDERING CLINICIAN: LILIANA SIEGEL   TECHNIQUE: CT of the abdomen and pelvis was performed.  Standard contiguous axial images were obtained at 3 mm slice thickness through the abdomen and pelvis. Coronal and sagittal reconstructions at 3 mm slice thickness were performed.     FINDINGS: Heart size is enlarged.   Interrogation of the lung base shows multiple pulmonary nodules including a right lower lobe nodule measuring 1.6 cm. Findings compatible pulmonary metastasis. Nodules seen in the right middle lobe as well as left lower lobe.   Multiple hepatic metastasis are seen. Reference 5 cm lesion at the dome of the  liver. 3.2 cm lesion seen in the inferior right hepatic lobe   Greater than 10 lesions detected. No hydronephrosis.       Retrocrural adenopathy is seen. Reference 1.4 retroperitoneal adenopathy seen in the para-aortic region also metastatic appearing vascular calcification. Gallstones. No biliary duct dilatation. Moderate retained stool in the colon. There is left upper quadrant 2.5 cm soft tissue lesion adjacent to the splenic flexure. Wall thickening is seen of the colon.. Features suspicious for underlying colonic neoplasm.   Prostate gland is enlarged. It deforms the bladder base. Left hip arthroplasty changes. Bilateral fat fat and bowel containing inguinal hernias which are fairly large. No evidence of obstruction. Right inguinal hernia measures 8.4 cm in left measures 8 8.7 cm in width.       Multilevel degenerative disc disease is seen.       Findings compatible with widespread metastatic disease including in the lungs  with numerous hepatic metastatic disease also retroperitoneal and retrocrural adenopathy is seen. Left upper quadrant soft tissue mass seen between the stomach in the liver. Findings favor metastatic colon carcinoma given the appearance. Correlation with any known colonoscopy results advised. Robust vascular calcification.   Large bilateral fat and bowel containing inguinal hernias   MACRO: None   Signed by: Oleg Pineda 3/3/2024 7:22 PM Dictation workstation:   [id number]    XR chest 2 views    Result Date: 3/3/2024  Interpreted By:  Kaleb Wen, STUDY: XR CHEST 2 VIEWS;  3/3/2024 6:01 pm   INDICATION: Signs/Symptoms:CP.   COMPARISON: Radiographs of the chest dated 06/20/2023;   ACCESSION NUMBER(S): [id number]   ORDERING CLINICIAN: LILIANA SIEGEL   FINDINGS: PA and lateral radiographs of the chest were provided.   Single left lead pacemaker/AICD is similar in appearance to prior exam.   CARDIOMEDIASTINAL SILHOUETTE: Cardiomediastinal silhouette is mildly enlarged, similar in  appearance to prior exam.   LUNGS: Somewhat low lung volumes are present in the lungs bilaterally with bibasilar atelectasis. There is suggestion of new nodular lesion is seen in the right lung compared to prior examination on 06/20/2023, incompletely characterized. No sizable pleural effusion or consolidation is evident.   ABDOMEN: No remarkable upper abdominal findings.   BONES: No acute osseous changes.       Somewhat low lung volumes in the lungs bilaterally with bibasilar atelectasis, without evidence of new consolidation or sizable pleural effusion, although there are new nodular opacities is present in the right lung, incompletely characterized on current exam. Consider dedicated CT of the chest for further evaluation.   MACRO: None   Signed by: Kaleb Wen 3/3/2024 6:31 PM Dictation workstation:   [id number]  Scheduled medications  amiodarone, 200 mg, oral, Daily  [Held by provider] apixaban, 5 mg, oral, BID  dapagliflozin propanediol, 10 mg, oral, Daily  lactulose, 60 g, oral, Daily  magnesium oxide, 400 mg, oral, Daily  melatonin, 3 mg, oral, Daily  metoprolol succinate XL, 50 mg, oral, Daily  oxybutynin, 5 mg, oral, BID  pantoprazole, 40 mg, oral, Daily before breakfast   Or  pantoprazole, 40 mg, intravenous, Daily before breakfast  polyethylene glycol, 17 g, oral, Daily  [Held by provider] sacubitriL-valsartan, 1 tablet, oral, BID  tafamidis, 61 mg, oral, Daily      Continuous medications     PRN medications  PRN medications: acetaminophen, nitroglycerin, ondansetron, oxyCODONE, oxyCODONE-acetaminophen, polyethylene glycol        Assessment/Plan   Oleg Yang is a 78-year-old male with a past medical history of SSS s/p ICD, HTN, HFrEF 2025%, cardiac amyloidosis, A-fib-on amiodarone and Eliquis, presenting to the hospital with severe abdominal pain.  CT abdomen pelvis shows widespread metastatic disease to the lungs, liver, retroperitoneal and retrocrural adenopathy suspicious for metastatic  colon disease.  He is being admitted to medicine for management of suspected metastatic colon cancer workup.    # Suspected metastatic malignancy to the lung and liver with primary source from:  # Anemia  # Hypotension  # Elevated Cr  -IR consulted.  They will schedule outpatient liver biopsy on Thursday at 1 PM.  - GI consult: Will perform colonoscopy tomorrow.  Prep has been given.  - Heme-onc consult: Agree with prior biopsy of liver lesion.  Last dose of Eliquis was given on 3/3/2024.  Continue to hold Eliquis before biopsy on Thursday.  -No further drop in hemoglobin from yesterday.  It is decreased from his baseline.  There is no evidence of any bleeding from stool or urine.  Anemia is normocytic likely secondary to ABHINAV from colon cancer and slow bleed.  Will follow-up iron studies.  -CEA elevated.  Is indicative of a possible colon cancer as primary source.  - Patients BP remains soft.  We will continue to hold his torsemide and Entresto. Monitor Cr, avoid nephrotoxic meds.    # HTN  # HFrEF EF 25%  # Cardiac amyloidosis  # A-fib-on amiodarone and Eliquis  # SSS s/p ICD  - Continue amiodarone, metoprolol, Farxiga, Tafamidis  - Hold Entresto and torsemide                  Jayjay Storey MD  PGY1 internal medicine

## 2024-03-06 ENCOUNTER — ANESTHESIA EVENT (OUTPATIENT)
Dept: GASTROENTEROLOGY | Facility: HOSPITAL | Age: 79
DRG: 375 | End: 2024-03-06
Payer: MEDICARE

## 2024-03-06 ENCOUNTER — APPOINTMENT (OUTPATIENT)
Dept: GASTROENTEROLOGY | Facility: HOSPITAL | Age: 79
DRG: 375 | End: 2024-03-06
Payer: MEDICARE

## 2024-03-06 ENCOUNTER — ANESTHESIA (OUTPATIENT)
Dept: GASTROENTEROLOGY | Facility: HOSPITAL | Age: 79
DRG: 375 | End: 2024-03-06
Payer: MEDICARE

## 2024-03-06 LAB
ALBUMIN SERPL BCP-MCNC: 3.1 G/DL (ref 3.4–5)
ANION GAP SERPL CALC-SCNC: 12 MMOL/L (ref 10–20)
BUN SERPL-MCNC: 21 MG/DL (ref 6–23)
CALCIUM SERPL-MCNC: 8.2 MG/DL (ref 8.6–10.3)
CHLORIDE SERPL-SCNC: 105 MMOL/L (ref 98–107)
CO2 SERPL-SCNC: 24 MMOL/L (ref 21–32)
CREAT SERPL-MCNC: 1.26 MG/DL (ref 0.5–1.3)
EGFRCR SERPLBLD CKD-EPI 2021: 58 ML/MIN/1.73M*2
ERYTHROCYTE [DISTWIDTH] IN BLOOD BY AUTOMATED COUNT: 16.1 % (ref 11.5–14.5)
GLUCOSE SERPL-MCNC: 83 MG/DL (ref 74–99)
HCT VFR BLD AUTO: 27.7 % (ref 41–52)
HGB BLD-MCNC: 8 G/DL (ref 13.5–17.5)
MCH RBC QN AUTO: 24.5 PG (ref 26–34)
MCHC RBC AUTO-ENTMCNC: 28.9 G/DL (ref 32–36)
MCV RBC AUTO: 85 FL (ref 80–100)
NRBC BLD-RTO: 0 /100 WBCS (ref 0–0)
PHOSPHATE SERPL-MCNC: 2.4 MG/DL (ref 2.5–4.9)
PLATELET # BLD AUTO: 259 X10*3/UL (ref 150–450)
POCT INTERNATIONAL NORMALIZATION RATIO: 1.3
POCT PROTHROMBIN TIME: 15.4 SECONDS
POTASSIUM SERPL-SCNC: 4.1 MMOL/L (ref 3.5–5.3)
RBC # BLD AUTO: 3.27 X10*6/UL (ref 4.5–5.9)
SODIUM SERPL-SCNC: 137 MMOL/L (ref 136–145)
WBC # BLD AUTO: 7.3 X10*3/UL (ref 4.4–11.3)

## 2024-03-06 PROCEDURE — 99233 SBSQ HOSP IP/OBS HIGH 50: CPT

## 2024-03-06 PROCEDURE — G0452 MOLECULAR PATHOLOGY INTERPR: HCPCS | Performed by: INTERNAL MEDICINE

## 2024-03-06 PROCEDURE — 1200000002 HC GENERAL ROOM WITH TELEMETRY DAILY

## 2024-03-06 PROCEDURE — A45378 PR COLONOSCOPY,DIAGNOSTIC: Performed by: NURSE ANESTHETIST, CERTIFIED REGISTERED

## 2024-03-06 PROCEDURE — 2500000001 HC RX 250 WO HCPCS SELF ADMINISTERED DRUGS (ALT 637 FOR MEDICARE OP): Performed by: INTERNAL MEDICINE

## 2024-03-06 PROCEDURE — A45378 PR COLONOSCOPY,DIAGNOSTIC: Performed by: ANESTHESIOLOGY

## 2024-03-06 PROCEDURE — 45380 COLONOSCOPY AND BIOPSY: CPT | Performed by: INTERNAL MEDICINE

## 2024-03-06 PROCEDURE — 99232 SBSQ HOSP IP/OBS MODERATE 35: CPT

## 2024-03-06 PROCEDURE — 88342 IMHCHEM/IMCYTCHM 1ST ANTB: CPT | Performed by: PATHOLOGY

## 2024-03-06 PROCEDURE — 2500000004 HC RX 250 GENERAL PHARMACY W/ HCPCS (ALT 636 FOR OP/ED): Performed by: NURSE ANESTHETIST, CERTIFIED REGISTERED

## 2024-03-06 PROCEDURE — 85027 COMPLETE CBC AUTOMATED: CPT

## 2024-03-06 PROCEDURE — 43239 EGD BIOPSY SINGLE/MULTIPLE: CPT | Performed by: INTERNAL MEDICINE

## 2024-03-06 PROCEDURE — 36415 COLL VENOUS BLD VENIPUNCTURE: CPT

## 2024-03-06 PROCEDURE — 81458 SO GSAP DNA CPY NMBR&MCRSTL: CPT | Performed by: INTERNAL MEDICINE

## 2024-03-06 PROCEDURE — 88305 TISSUE EXAM BY PATHOLOGIST: CPT | Performed by: PATHOLOGY

## 2024-03-06 PROCEDURE — 2500000005 HC RX 250 GENERAL PHARMACY W/O HCPCS: Performed by: NURSE ANESTHETIST, CERTIFIED REGISTERED

## 2024-03-06 PROCEDURE — 88342 IMHCHEM/IMCYTCHM 1ST ANTB: CPT | Mod: TC,PARLAB | Performed by: INTERNAL MEDICINE

## 2024-03-06 PROCEDURE — 88341 IMHCHEM/IMCYTCHM EA ADD ANTB: CPT | Mod: TC,PARLAB | Performed by: INTERNAL MEDICINE

## 2024-03-06 PROCEDURE — 80069 RENAL FUNCTION PANEL: CPT

## 2024-03-06 PROCEDURE — 7100000002 HC RECOVERY ROOM TIME - EACH INCREMENTAL 1 MINUTE

## 2024-03-06 PROCEDURE — 2500000002 HC RX 250 W HCPCS SELF ADMINISTERED DRUGS (ALT 637 FOR MEDICARE OP, ALT 636 FOR OP/ED): Mod: MUE | Performed by: INTERNAL MEDICINE

## 2024-03-06 PROCEDURE — 3700000002 HC GENERAL ANESTHESIA TIME - EACH INCREMENTAL 1 MINUTE

## 2024-03-06 PROCEDURE — 88305 TISSUE EXAM BY PATHOLOGIST: CPT | Mod: TC,PARLAB | Performed by: INTERNAL MEDICINE

## 2024-03-06 PROCEDURE — 0DBK8ZX EXCISION OF ASCENDING COLON, VIA NATURAL OR ARTIFICIAL OPENING ENDOSCOPIC, DIAGNOSTIC: ICD-10-PCS | Performed by: INTERNAL MEDICINE

## 2024-03-06 PROCEDURE — 2500000004 HC RX 250 GENERAL PHARMACY W/ HCPCS (ALT 636 FOR OP/ED): Performed by: INTERNAL MEDICINE

## 2024-03-06 PROCEDURE — 7100000001 HC RECOVERY ROOM TIME - INITIAL BASE CHARGE

## 2024-03-06 PROCEDURE — 88341 IMHCHEM/IMCYTCHM EA ADD ANTB: CPT | Performed by: PATHOLOGY

## 2024-03-06 PROCEDURE — 0DB48ZX EXCISION OF ESOPHAGOGASTRIC JUNCTION, VIA NATURAL OR ARTIFICIAL OPENING ENDOSCOPIC, DIAGNOSTIC: ICD-10-PCS | Performed by: INTERNAL MEDICINE

## 2024-03-06 PROCEDURE — 3700000001 HC GENERAL ANESTHESIA TIME - INITIAL BASE CHARGE

## 2024-03-06 PROCEDURE — 99100 ANES PT EXTEME AGE<1 YR&>70: CPT | Performed by: ANESTHESIOLOGY

## 2024-03-06 RX ORDER — PHENYLEPHRINE HCL IN 0.9% NACL 1 MG/10 ML
SYRINGE (ML) INTRAVENOUS AS NEEDED
Status: DISCONTINUED | OUTPATIENT
Start: 2024-03-06 | End: 2024-03-06

## 2024-03-06 RX ORDER — KETAMINE HCL IN NACL, ISO-OSM 100MG/10ML
SYRINGE (ML) INJECTION AS NEEDED
Status: DISCONTINUED | OUTPATIENT
Start: 2024-03-06 | End: 2024-03-06

## 2024-03-06 RX ORDER — LIDOCAINE HCL/PF 100 MG/5ML
SYRINGE (ML) INTRAVENOUS AS NEEDED
Status: DISCONTINUED | OUTPATIENT
Start: 2024-03-06 | End: 2024-03-06

## 2024-03-06 RX ORDER — PROPOFOL 10 MG/ML
INJECTION, EMULSION INTRAVENOUS AS NEEDED
Status: DISCONTINUED | OUTPATIENT
Start: 2024-03-06 | End: 2024-03-06

## 2024-03-06 RX ORDER — PROPOFOL 10 MG/ML
INJECTION, EMULSION INTRAVENOUS CONTINUOUS PRN
Status: DISCONTINUED | OUTPATIENT
Start: 2024-03-06 | End: 2024-03-06

## 2024-03-06 RX ADMIN — OXYBUTYNIN CHLORIDE 5 MG: 5 TABLET ORAL at 08:36

## 2024-03-06 RX ADMIN — LACTULOSE 60 G: 20 SOLUTION ORAL at 08:39

## 2024-03-06 RX ADMIN — DAPAGLIFLOZIN 10 MG: 10 TABLET, FILM COATED ORAL at 08:34

## 2024-03-06 RX ADMIN — Medication 100 MCG: at 14:22

## 2024-03-06 RX ADMIN — SODIUM CHLORIDE, POTASSIUM CHLORIDE, SODIUM LACTATE AND CALCIUM CHLORIDE: 600; 310; 30; 20 INJECTION, SOLUTION INTRAVENOUS at 13:41

## 2024-03-06 RX ADMIN — PROPOFOL 10 MG: 10 INJECTION, EMULSION INTRAVENOUS at 14:30

## 2024-03-06 RX ADMIN — PROPOFOL 10 MG: 10 INJECTION, EMULSION INTRAVENOUS at 14:34

## 2024-03-06 RX ADMIN — PROPOFOL 20 MG: 10 INJECTION, EMULSION INTRAVENOUS at 13:47

## 2024-03-06 RX ADMIN — MAGNESIUM OXIDE TAB 400 MG (241.3 MG ELEMENTAL MG) 400 MG: 400 (241.3 MG) TAB at 08:29

## 2024-03-06 RX ADMIN — LIDOCAINE HYDROCHLORIDE 40 MG: 20 INJECTION, SOLUTION INTRAVENOUS at 13:47

## 2024-03-06 RX ADMIN — PROPOFOL 100 MCG/KG/MIN: 10 INJECTION, EMULSION INTRAVENOUS at 13:47

## 2024-03-06 RX ADMIN — PROPOFOL 10 MG: 10 INJECTION, EMULSION INTRAVENOUS at 13:49

## 2024-03-06 RX ADMIN — Medication 100 MCG: at 14:14

## 2024-03-06 RX ADMIN — Medication 30 MG: at 13:47

## 2024-03-06 RX ADMIN — PROPOFOL 10 MG: 10 INJECTION, EMULSION INTRAVENOUS at 13:57

## 2024-03-06 RX ADMIN — OXYBUTYNIN CHLORIDE 5 MG: 5 TABLET ORAL at 20:44

## 2024-03-06 SDOH — HEALTH STABILITY: MENTAL HEALTH: CURRENT SMOKER: 0

## 2024-03-06 ASSESSMENT — COGNITIVE AND FUNCTIONAL STATUS - GENERAL
WALKING IN HOSPITAL ROOM: A LITTLE
MOBILITY SCORE: 22
DAILY ACTIVITIY SCORE: 24
CLIMB 3 TO 5 STEPS WITH RAILING: A LITTLE

## 2024-03-06 ASSESSMENT — PAIN - FUNCTIONAL ASSESSMENT
PAIN_FUNCTIONAL_ASSESSMENT: 0-10

## 2024-03-06 ASSESSMENT — PAIN SCALES - GENERAL
PAINLEVEL_OUTOF10: 0 - NO PAIN
PAINLEVEL_OUTOF10: 6
PAINLEVEL_OUTOF10: 0 - NO PAIN
PAINLEVEL_OUTOF10: 0 - NO PAIN

## 2024-03-06 NOTE — CARE PLAN
The patient's goals for the shift include no pain    The clinical goals for the shift include no pain throught shift

## 2024-03-06 NOTE — PROGRESS NOTES
03/06/24 1259   Discharge Planning   Living Arrangements Spouse/significant other   Support Systems Spouse/significant other;Family members;Friends/neighbors   Type of Residence Private residence     I attempted to meet with patient at his bedside but he was leaving the unit for EGD & colonoscopy.  Patient from home with his wife, ambulates with a cane and is independent at baseline.  This TCC to meet with patient when he returns from procedures.  Joann GUNTER TCC

## 2024-03-06 NOTE — PROGRESS NOTES
"Hematology/Oncology Progress Note    Patient Name: Oleg Yang   YOB: 1945    Subjective:  Unable to complete as patient is out of room for procedures.    Objective:    /60 (BP Location: Left arm, Patient Position: Sitting)   Pulse 85   Temp 36.9 °C (98.4 °F) (Temporal)   Resp 16   Ht 1.702 m (5' 7\")   Wt 82.1 kg (181 lb)   SpO2 99%   BMI 28.35 kg/m²     Physical Exam:  Unable to complete as patient is out of room for procedures.    EGD    Result Date: 3/6/2024  Table formatting from the original result was not included. Impression Essentially negative examination..  The purpose of the EGD was to look for any primary to explain the patient's metastatic disease.  Very minimal irregularity just the GE junction was noticed this was biopsied.  Minimal bleeding which subsided spontaneously.  No varices noted.  Particular attention was given to the stomach and the anterior area were the CT scanhad raised  some question about a lesion in the colon and again no abnormalities were noted. The cricopharynx, upper third of the esophagus, middle third of the esophagus and lower third of the esophagus appeared normal. Mild friable and nodular mucosa; performed cold forceps biopsy The cardia, fundus of the stomach, body of the stomach, greater curve of the stomach, lesser curve of the stomach, antrum, prepyloric region and pylorus appeared normal. The duodenal bulb, 1st part of the duodenum and 2nd part of the duodenum appeared normal. Findings The cricopharynx, upper third of the esophagus, middle third of the esophagus and lower third of the esophagus appeared normal. Mild, localized friable and nodular mucosa; no bleeding was identified; performed cold forceps biopsy; The cardia, fundus of the stomach, body of the stomach, greater curve of the stomach, lesser curve of the stomach, antrum, prepyloric region and pylorus appeared normal. The duodenal bulb, 1st part of the duodenum and 2nd part of the " duodenum appeared normal. Recommendation  Follow up with PCP Indication Metastatic malignant neoplasm, unspecified site (CMS/HCC) Staff Staff Role Paras García MD Proceduralist Medications See Anesthesia Record. Preprocedure A history and physical has been performed, and patient medication allergies have been reviewed. The patient's tolerance of previous anesthesia has been reviewed. The risks and benefits of the procedure and the sedation options and risks were discussed with the patient. All questions were answered and informed consent obtained. Details of the Procedure The patient underwent monitored anesthesia care, which was administered by an anesthesia professional. The patient's blood pressure, ECG, ETCO2, heart rate, level of consciousness, oxygen and respirations were monitored throughout the procedure. The scope was introduced through the mouth and advanced to the second part of the duodenum. Retroflexion was performed in the cardia. Prior to the procedure, the patient's H. Pylori status was unknown. The patient experienced no blood loss. The procedure was not difficult. The patient tolerated the procedure well. Events Procedure Events Event Event Time ENDO SCOPE IN TIME 3/6/2024  1:50 PM ENDO SCOPE OUT TIME 3/6/2024  1:57 PM Specimens ID Type Source Tests Collected by Time 1 : G-E junction bx's, cold bx Tissue ESOPHAGOGASTRIC JUNCTION BIOPSY SURGICAL PATHOLOGY EXAM Paras García MD 3/6/2024 1355 Procedure Location [place] [id number] [facility] [id number] [phone] Referring Provider No referring provider defined for this encounter. Procedure Provider No name on file     ECG 12 lead    Result Date: 3/4/2024  Sinus rhythm with AV dissociation and Accelerated Junctional rhythm with Premature supraventricular complexes and with occasional Premature ventricular complexes Left axis deviation Cannot rule out Inferior infarct (cited on or before 21-JUN-2023) Prolonged  QT Abnormal ECG When compared with ECG of 21-JUN-2023 13:18, Junctional rhythm has replaced Sinus rhythm Vent. rate has increased BY  38 BPM Incomplete right bundle branch block is no longer Present Criteria for Septal infarct are no longer Present    US scrotum w doppler    Result Date: 3/3/2024  Interpreted By:  Oleg Pineda, STUDY: US SCROTUM WITH DOPPLER;  3/3/2024 6:28 pm   INDICATION: Signs/Symptoms:b/l scrotal swelling and pain.   COMPARISON: None.   ACCESSION NUMBER(S): [id number]   ORDERING CLINICIAN: LILIANA SIEGEL   TECHNIQUE: Multiple ultrasonographic images of scrotum and tested were obtained.   FINDINGS: RIGHT HEMISCROTUM:   RIGHT TESTICLE: The right testicle measures 3.17 x 2.04x 3.68. The right testicle demonstrates a homogeneous echotexture and normal contour. Normal vascularity and Doppler waveforms are observed in the right testicle. 7 mm right testicular cyst   RIGHT EPIDIDYMIS: The right epididymal head measures .89 x 1.21 x 1.06 and is within normal limits. Mildly heterogeneous   LEFT HEMISCROTUM:   LEFT TESTICLE: The left testicle measures 2.63 x 2.33 x 3.70. The left testicle demonstrates a homogeneous echotexture and normal contour. Normal vascularity and Doppler waveforms are observed in the left testicle.   LEFT EPIDIDYMIS: The left epididymal head measures 1.12 x 1.47 x 1.08 and is within normal limits.  Mildly heterogeneous   Large bilateral inguinal hernias. Small bilateral hydroceles.       Large bilateral inguinal hernias containing fat and bowel.   No evidence of torsion or inflammation. Mild bilateral heterogeneity of the epididymal structures   MACRO: None   Signed by: Oleg Pineda 3/3/2024 7:26 PM Dictation workstation:   [id number]    CT abdomen pelvis w IV contrast    Result Date: 3/3/2024  Interpreted By:  Oleg Pineda, STUDY: CT ABDOMEN PELVIS W IV CONTRAST;  3/3/2024 6:56 pm   INDICATION: Signs/Symptoms:RLQ pain, b/l inguinal hernias w/ increased swelling and pain.    COMPARISON: None.   ACCESSION NUMBER(S): [id number]   ORDERING CLINICIAN: LILIANA SIEGEL   TECHNIQUE: CT of the abdomen and pelvis was performed.  Standard contiguous axial images were obtained at 3 mm slice thickness through the abdomen and pelvis. Coronal and sagittal reconstructions at 3 mm slice thickness were performed.     FINDINGS: Heart size is enlarged.   Interrogation of the lung base shows multiple pulmonary nodules including a right lower lobe nodule measuring 1.6 cm. Findings compatible pulmonary metastasis. Nodules seen in the right middle lobe as well as left lower lobe.   Multiple hepatic metastasis are seen. Reference 5 cm lesion at the dome of the liver. 3.2 cm lesion seen in the inferior right hepatic lobe   Greater than 10 lesions detected. No hydronephrosis.       Retrocrural adenopathy is seen. Reference 1.4 retroperitoneal adenopathy seen in the para-aortic region also metastatic appearing vascular calcification. Gallstones. No biliary duct dilatation. Moderate retained stool in the colon. There is left upper quadrant 2.5 cm soft tissue lesion adjacent to the splenic flexure. Wall thickening is seen of the colon.. Features suspicious for underlying colonic neoplasm.   Prostate gland is enlarged. It deforms the bladder base. Left hip arthroplasty changes. Bilateral fat fat and bowel containing inguinal hernias which are fairly large. No evidence of obstruction. Right inguinal hernia measures 8.4 cm in left measures 8 8.7 cm in width.       Multilevel degenerative disc disease is seen.       Findings compatible with widespread metastatic disease including in the lungs  with numerous hepatic metastatic disease also retroperitoneal and retrocrural adenopathy is seen. Left upper quadrant soft tissue mass seen between the stomach in the liver. Findings favor metastatic colon carcinoma given the appearance. Correlation with any known colonoscopy results advised. Robust vascular calcification.   Large  bilateral fat and bowel containing inguinal hernias   MACRO: None   Signed by: Oleg Pineda 3/3/2024 7:22 PM Dictation workstation:   [id number]    XR chest 2 views    Result Date: 3/3/2024  Interpreted By:  Kaleb Wen, STUDY: XR CHEST 2 VIEWS;  3/3/2024 6:01 pm   INDICATION: Signs/Symptoms:CP.   COMPARISON: Radiographs of the chest dated 06/20/2023;   ACCESSION NUMBER(S): [id number]   ORDERING CLINICIAN: LILIANA SIEGEL   FINDINGS: PA and lateral radiographs of the chest were provided.   Single left lead pacemaker/AICD is similar in appearance to prior exam.   CARDIOMEDIASTINAL SILHOUETTE: Cardiomediastinal silhouette is mildly enlarged, similar in appearance to prior exam.   LUNGS: Somewhat low lung volumes are present in the lungs bilaterally with bibasilar atelectasis. There is suggestion of new nodular lesion is seen in the right lung compared to prior examination on 06/20/2023, incompletely characterized. No sizable pleural effusion or consolidation is evident.   ABDOMEN: No remarkable upper abdominal findings.   BONES: No acute osseous changes.       Somewhat low lung volumes in the lungs bilaterally with bibasilar atelectasis, without evidence of new consolidation or sizable pleural effusion, although there are new nodular opacities is present in the right lung, incompletely characterized on current exam. Consider dedicated CT of the chest for further evaluation.   MACRO: None   Signed by: Kaleb Wen 3/3/2024 6:31 PM Dictation workstation:   [id number]       Assessment/Plan:  Oleg Yang is a 78 y.o. male with past medical history of ASHD, HTN, HFrEF (with ICD; EF 20-25%), Afib (on eliquis) who presented to [facility] ED on 3/3/24 with chest and abdominal pain. Heme/Onc was consulted for evaluation of suspected metastatic colon cancer.    Suspected Metastatic Cancer suspect colon vs pancreatic   ABHINAV    Agree with EGD and Colonoscopy and plan for biopsy of hepatic lesion. Last dose of  Eliquis was on 03/03/24. Patient will need outpatient follow-up with Heme/Onc.  Iron studies consistent with ABHINAV, agree with venofer.     I have reviewed and interpreted all lab test imaging studies and documentations from other healthcare providers.    This is a preliminary note, please await attending attestation for a finalized plan.    Kd Adorno MD  Hematology/Oncology

## 2024-03-06 NOTE — PROGRESS NOTES
Oleg Yang is a 78 y.o. male on day 3 of admission presenting with Metastatic malignant neoplasm, unspecified site (CMS/HCC).      Subjective   Patient seen and examined at bedside.  Patient sitting up.  Feels well.  About to undergo colonoscopy and EGD today.       Objective     Last Recorded Vitals  /69   Pulse 76   Temp 37 °C (98.6 °F) (Tympanic)   Resp 24   Wt 82.1 kg (181 lb)   SpO2 99%   Intake/Output last 3 Shifts:    Intake/Output Summary (Last 24 hours) at 3/6/2024 1713  Last data filed at 3/6/2024 1522  Gross per 24 hour   Intake 447.13 ml   Output --   Net 447.13 ml         Admission Weight  Weight: 82.1 kg (181 lb) (03/03/24 1647)    Daily Weight  03/06/24 : 82.1 kg (181 lb)    Image Results  EGD  Table formatting from the original result was not included.  Impression  Essentially negative examination..  The purpose of the EGD was to look for   any primary to explain the patient's metastatic disease.  Very minimal   irregularity just the GE junction was noticed this was biopsied.  Minimal   bleeding which subsided spontaneously.  No varices noted.  Particular   attention was given to the stomach and the anterior area were the CT   scanhad raised  some question about a lesion in the colon and again no   abnormalities were noted.  The cricopharynx, upper third of the esophagus, middle third of the   esophagus and lower third of the esophagus appeared normal.  Mild friable and nodular mucosa; performed cold forceps biopsy  The cardia, fundus of the stomach, body of the stomach, greater curve of   the stomach, lesser curve of the stomach, antrum, prepyloric region and   pylorus appeared normal.  The duodenal bulb, 1st part of the duodenum and 2nd part of the duodenum   appeared normal.    Findings  The cricopharynx, upper third of the esophagus, middle third of the   esophagus and lower third of the esophagus appeared normal.  Mild, localized friable and nodular mucosa; no bleeding was identified;    performed cold forceps biopsy;  The cardia, fundus of the stomach, body of the stomach, greater curve of   the stomach, lesser curve of the stomach, antrum, prepyloric region and   pylorus appeared normal.  The duodenal bulb, 1st part of the duodenum and 2nd part of the duodenum   appeared normal.    Recommendation   Follow up with PCP     Indication  Metastatic malignant neoplasm, unspecified site (CMS/HCC)    Staff  Staff Role   Paras García MD Proceduralist     Medications  See Anesthesia Record.     Preprocedure  A history and physical has been performed, and patient medication   allergies have been reviewed. The patient's tolerance of previous   anesthesia has been reviewed. The risks and benefits of the procedure and   the sedation options and risks were discussed with the patient. All   questions were answered and informed consent obtained.    Details of the Procedure  The patient underwent monitored anesthesia care, which was administered by   an anesthesia professional. The patient's blood pressure, ECG, ETCO2,   heart rate, level of consciousness, oxygen and respirations were monitored   throughout the procedure. The scope was introduced through the mouth and   advanced to the second part of the duodenum. Retroflexion was performed in   the cardia. Prior to the procedure, the patient's H. Pylori status was   unknown. The patient experienced no blood loss. The procedure was not   difficult. The patient tolerated the procedure well.     Events  Procedure Events   Event Event Time   ENDO SCOPE IN TIME 3/6/2024  1:50 PM   ENDO SCOPE OUT TIME 3/6/2024  1:57 PM     Specimens  ID Type Source Tests Collected by Time   1 : G-E junction bx's, cold bx Tissue ESOPHAGOGASTRIC JUNCTION BIOPSY   SURGICAL PATHOLOGY EXAM Paras García MD 3/6/2024 1355     Procedure Location  [place] [address]  [address] [facility] [address] [phone]    Referring Provider  No referring provider  defined for this encounter.    Procedure Provider  No name on file  Colonoscopy Diagnostic  Table formatting from the original result was not included.  Impression  Large ulcerated lesion the hepatic flexure/ascending colon.  Multiple   biopsies taken could not advance beyond this because the length of the   scope vanished  Medium, protruding hemorrhoids  The transverse colon, splenic flexure, descending colon, sigmoid colon,   rectosigmoid and rectum appeared normal.  Fungating, invasive, malignant-appearing, polypoid and ulcerated mass in   the ascending colon; performed cold forceps biopsy  One 10 mm polyp in the transverse colon.  This was approximately 5 cm away   distally from the lesion seen on the right side and hence not removed.  The exam was very difficult for the patient bilateral inguinal hernias the   left inguinal hernia was completely reduced and and RN apply pressure on   the inguinal ring with this in place we were able to pass the scope into   the descending colon.  There was nothing in the splenic flexure area as   indicated on the CT scan.  Eventually I reached the junction between the   hepatic from the transverse colon getting beyond that was very difficult   because of multiple loops involved patient.  The patient had to be   repositioned back and forth between his back and the left lateral position   and eventually with persistent I was able to just get into the ascending   colon with at which time I could see what looked like an annual elevation   with a dirty base.  Multiple biopsies were taken.  This should be   considered carcinoma under poor otherwise.  I could not write wants any   further because a full length of the scope ran out    Findings  Internal medium, protruding hemorrhoids  The transverse colon, splenic flexure, descending colon, sigmoid colon,   rectosigmoid and rectum appeared normal.;  Fungating, invasive, malignant-appearing, polypoid and ulcerated mass in   the  ascending colon; performed cold forceps biopsy  One 10 mm sessile, adenomatous-appearing polyp in the transverse colon; no   bleeding was identified       Recommendation   Follow up with PCP    Follow up with PCP     Indication  Abnormal CT scan, colon    Staff  Staff Role   Paras García MD Proceduralist     Medications  See Anesthesia Record.     Preprocedure  A history and physical has been performed, and patient medication   allergies have been reviewed. The patient's tolerance of previous   anesthesia has been reviewed. The risks and benefits of the procedure and   the sedation options and risks were discussed with the patient. All   questions were answered and informed consent obtained.    Details of the Procedure  The patient underwent monitored anesthesia care, which was administered by   an anesthesia professional. The patient's blood pressure, ECG, ETCO2,   heart rate, level of consciousness, oxygen and respirations were monitored   throughout the procedure. A digital rectal exam was performed. The scope   was introduced through the anus and advanced to the terminal ileum.   Retroflexion was performed in the rectum. The quality of bowel preparation   was evaluated using the Winn Bowel Preparation Scale with scores of:   right colon = 2, transverse colon = 2, left colon = 2. The total BBPS   score was 6. Bowel prep was adequate. The procedure was difficult due to   angulation, loops in the digestive tract and patient physique. In response   to procedure difficulty, counter pressure was applied, the observation   site was lavaged and the patient was repositioned. The patient tolerated   the procedure well. There were no apparent adverse events.     Events  Procedure Events   Event Event Time   ENDO SCOPE IN TIME 3/6/2024  1:50 PM   ENDO SCOPE OUT TIME 3/6/2024  1:57 PM   ENDO SCOPE IN TIME 3/6/2024  2:07 PM   ENDO SCOPE OUT TIME 3/6/2024  3:12 PM     Specimens  ID Type Source Tests Collected by Time   1 :  G-E junction bx's, cold bx Tissue ESOPHAGOGASTRIC JUNCTION BIOPSY   SURGICAL PATHOLOGY EXAM Paras García MD 3/6/2024 1355   2 : COLD BX, RIGHT COLON BX'S Tissue COLON  - RANDOM BIOPSY SURGICAL   PATHOLOGY EXAM Paras García MD 3/6/2024 1504     Procedure Location  [facility] [address]  [address]  [facility] [id number] [phone]    Referring Provider  No referring provider defined for this encounter.    Procedure Provider  No name on file      Physical Exam  Physical Exam:  General:  Pleasant and cooperative. No apparent distress.  HEENT:  Normocephalic, atraumatic, mucus membranes moist.   Neck:  Trachea midline.  No JVD.    Chest:  Clear to auscultation bilaterally. No wheezes, rales, or rhonchi.  CV:  Regular rate and rhythm.  Positive S1/S2.  Left-sided implant.  Abdomen: Bowel sounds present in all four quadrants,.  Generalized abdominal tenderness  Extremities:  No lower extremity edema or cyanosis.   Neurological:  AAOx3. No focal deficits.  Skin:  Warm and dry.   Relevant Results  Results for orders placed or performed during the hospital encounter of 03/03/24 (from the past 24 hour(s))   CBC   Result Value Ref Range    WBC 7.3 4.4 - 11.3 x10*3/uL    nRBC 0.0 0.0 - 0.0 /100 WBCs    RBC 3.27 (L) 4.50 - 5.90 x10*6/uL    Hemoglobin 8.0 (L) 13.5 - 17.5 g/dL    Hematocrit 27.7 (L) 41.0 - 52.0 %    MCV 85 80 - 100 fL    MCH 24.5 (L) 26.0 - 34.0 pg    MCHC 28.9 (L) 32.0 - 36.0 g/dL    RDW 16.1 (H) 11.5 - 14.5 %    Platelets 259 150 - 450 x10*3/uL   Renal Function Panel   Result Value Ref Range    Glucose 83 74 - 99 mg/dL    Sodium 137 136 - 145 mmol/L    Potassium 4.1 3.5 - 5.3 mmol/L    Chloride 105 98 - 107 mmol/L    Bicarbonate 24 21 - 32 mmol/L    Anion Gap 12 10 - 20 mmol/L    Urea Nitrogen 21 6 - 23 mg/dL    Creatinine 1.26 0.50 - 1.30 mg/dL    eGFR 58 (L) >60 mL/min/1.73m*2    Calcium 8.2 (L) 8.6 - 10.3 mg/dL    Phosphorus 2.4 (L) 2.5 - 4.9 mg/dL    Albumin 3.1 (L) 3.4 - 5.0  g/dL   POCT PT/INR   Result Value Ref Range    POCT Prothrombin time 15.4 seconds    POCT INR 1.3     ECG 12 lead    Result Date: 3/4/2024  Sinus rhythm with AV dissociation and Accelerated Junctional rhythm with Premature supraventricular complexes and with occasional Premature ventricular complexes Left axis deviation Cannot rule out Inferior infarct (cited on or before 21-JUN-2023) Prolonged QT Abnormal ECG When compared with ECG of 21-JUN-2023 13:18, Junctional rhythm has replaced Sinus rhythm Vent. rate has increased BY  38 BPM Incomplete right bundle branch block is no longer Present Criteria for Septal infarct are no longer Present    US scrotum w doppler    Result Date: 3/3/2024  Interpreted By:  Oleg Pineda, STUDY: US SCROTUM WITH DOPPLER;  3/3/2024 6:28 pm   INDICATION: Signs/Symptoms:b/l scrotal swelling and pain.   COMPARISON: None.   ACCESSION NUMBER(S): [id number]   ORDERING CLINICIAN: LILIANA SIEGEL   TECHNIQUE: Multiple ultrasonographic images of scrotum and tested were obtained.   FINDINGS: RIGHT HEMISCROTUM:   RIGHT TESTICLE: The right testicle measures 3.17 x 2.04x 3.68. The right testicle demonstrates a homogeneous echotexture and normal contour. Normal vascularity and Doppler waveforms are observed in the right testicle. 7 mm right testicular cyst   RIGHT EPIDIDYMIS: The right epididymal head measures .89 x 1.21 x 1.06 and is within normal limits. Mildly heterogeneous   LEFT HEMISCROTUM:   LEFT TESTICLE: The left testicle measures 2.63 x 2.33 x 3.70. The left testicle demonstrates a homogeneous echotexture and normal contour. Normal vascularity and Doppler waveforms are observed in the left testicle.   LEFT EPIDIDYMIS: The left epididymal head measures 1.12 x 1.47 x 1.08 and is within normal limits.  Mildly heterogeneous   Large bilateral inguinal hernias. Small bilateral hydroceles.       Large bilateral inguinal hernias containing fat and bowel.   No evidence of torsion or inflammation. Mild  bilateral heterogeneity of the epididymal structures   MACRO: None   Signed by: Oleg Pineda 3/3/2024 7:26 PM Dictation workstation:   [id number]    CT abdomen pelvis w IV contrast    Result Date: 3/3/2024  Interpreted By:  Oleg Pineda, STUDY: CT ABDOMEN PELVIS W IV CONTRAST;  3/3/2024 6:56 pm   INDICATION: Signs/Symptoms:RLQ pain, b/l inguinal hernias w/ increased swelling and pain.   COMPARISON: None.   ACCESSION NUMBER(S): [id number]   ORDERING CLINICIAN: LILIANA SIEGEL   TECHNIQUE: CT of the abdomen and pelvis was performed.  Standard contiguous axial images were obtained at 3 mm slice thickness through the abdomen and pelvis. Coronal and sagittal reconstructions at 3 mm slice thickness were performed.     FINDINGS: Heart size is enlarged.   Interrogation of the lung base shows multiple pulmonary nodules including a right lower lobe nodule measuring 1.6 cm. Findings compatible pulmonary metastasis. Nodules seen in the right middle lobe as well as left lower lobe.   Multiple hepatic metastasis are seen. Reference 5 cm lesion at the dome of the liver. 3.2 cm lesion seen in the inferior right hepatic lobe   Greater than 10 lesions detected. No hydronephrosis.       Retrocrural adenopathy is seen. Reference 1.4 retroperitoneal adenopathy seen in the para-aortic region also metastatic appearing vascular calcification. Gallstones. No biliary duct dilatation. Moderate retained stool in the colon. There is left upper quadrant 2.5 cm soft tissue lesion adjacent to the splenic flexure. Wall thickening is seen of the colon.. Features suspicious for underlying colonic neoplasm.   Prostate gland is enlarged. It deforms the bladder base. Left hip arthroplasty changes. Bilateral fat fat and bowel containing inguinal hernias which are fairly large. No evidence of obstruction. Right inguinal hernia measures 8.4 cm in left measures 8 8.7 cm in width.       Multilevel degenerative disc disease is seen.       Findings  compatible with widespread metastatic disease including in the lungs  with numerous hepatic metastatic disease also retroperitoneal and retrocrural adenopathy is seen. Left upper quadrant soft tissue mass seen between the stomach in the liver. Findings favor metastatic colon carcinoma given the appearance. Correlation with any known colonoscopy results advised. Robust vascular calcification.   Large bilateral fat and bowel containing inguinal hernias   MACRO: None   Signed by: Oleg Pineda 3/3/2024 7:22 PM Dictation workstation:   [id number]    XR chest 2 views    Result Date: 3/3/2024  Interpreted By:  Kaleb Wen, STUDY: XR CHEST 2 VIEWS;  3/3/2024 6:01 pm   INDICATION: Signs/Symptoms:CP.   COMPARISON: Radiographs of the chest dated 06/20/2023;   ACCESSION NUMBER(S): [id number]   ORDERING CLINICIAN: LILIANA SIEGEL   FINDINGS: PA and lateral radiographs of the chest were provided.   Single left lead pacemaker/AICD is similar in appearance to prior exam.   CARDIOMEDIASTINAL SILHOUETTE: Cardiomediastinal silhouette is mildly enlarged, similar in appearance to prior exam.   LUNGS: Somewhat low lung volumes are present in the lungs bilaterally with bibasilar atelectasis. There is suggestion of new nodular lesion is seen in the right lung compared to prior examination on 06/20/2023, incompletely characterized. No sizable pleural effusion or consolidation is evident.   ABDOMEN: No remarkable upper abdominal findings.   BONES: No acute osseous changes.       Somewhat low lung volumes in the lungs bilaterally with bibasilar atelectasis, without evidence of new consolidation or sizable pleural effusion, although there are new nodular opacities is present in the right lung, incompletely characterized on current exam. Consider dedicated CT of the chest for further evaluation.   MACRO: None   Signed by: Kaleb Wen 3/3/2024 6:31 PM Dictation workstation:   [id number]  Scheduled  medications  amiodarone, 200 mg, oral, Daily  [Held by provider] apixaban, 5 mg, oral, BID  dapagliflozin propanediol, 10 mg, oral, Daily  iron sucrose, 200 mg, intravenous, Daily  lactulose, 60 g, oral, Daily  magnesium oxide, 400 mg, oral, Daily  metoprolol succinate XL, 50 mg, oral, Daily  oxybutynin, 5 mg, oral, BID  pantoprazole, 40 mg, oral, Daily before breakfast   Or  pantoprazole, 40 mg, intravenous, Daily before breakfast  [Held by provider] sacubitriL-valsartan, 1 tablet, oral, BID  tafamidis, 61 mg, oral, Daily      Continuous medications     PRN medications  PRN medications: acetaminophen, melatonin, nitroglycerin, ondansetron, oxyCODONE, oxyCODONE-acetaminophen, polyethylene glycol        Assessment/Plan   Oleg Yang is a 78-year-old male with a past medical history of SSS s/p ICD, HTN, HFrEF 2025%, cardiac amyloidosis, A-fib-on amiodarone and Eliquis, presenting to the hospital with severe abdominal pain.  CT abdomen pelvis shows widespread metastatic disease to the lungs, liver, retroperitoneal and retrocrural adenopathy suspicious for metastatic colon disease.  He is being admitted to medicine for management of suspected metastatic colon cancer workup.    # Suspected metastatic malignancy to the lung and liver with primary source  # Anemia  # Hypotension  - Patient to undergo EGD and colonoscopy today.  GI have found presence of tumor in the cecum.  It was send biopsies.  Will follow-up colonoscopy report and EGD report.  -IR consulted.  They will schedule outpatient liver biopsy on Thursday at 1 PM as outpatient.  Plan is to discharge patient tomorrow morning and they can go for biopsy tomorrow.  Continue to hold Eliquis.  - Heme-onc consult: Team on board.  They will wait for show imaging colonoscopy results.  -Hemoglobin remained stable.  This is iron deficiency anemia.  We will continue to give Venofer due to low iron.  -.  CA 19-9 is elevated.  - Creatinine is improved.  Back to baseline.    #  HTN  # HFrEF EF 25%  # Cardiac amyloidosis  # A-fib-on amiodarone and Eliquis  # SSS s/p ICD  - Continue amiodarone, metoprolol, Farxiga, Tafamidis  - Hold Entresto and torsemide                  Jayjay Storey MD  PGY1 internal medicine

## 2024-03-06 NOTE — ANESTHESIA PREPROCEDURE EVALUATION
Patient: Oleg Yang    Procedure Information       Date/Time: 03/06/24 1255    Scheduled providers: Paras García MD; Vipul Lu MD    Procedures:       COLONOSCOPY      EGD    Location: [facility]            Relevant Problems   Anesthesia (within normal limits)      Cardiovascular   (+) Athscl heart disease of native coronary artery w/o ang pctrs   (+) Benign essential HTN   (+) Chronic systolic (congestive) heart failure (CMS/HCC)   (+) Hypercholesterolemia   (+) Paroxysmal atrial fibrillation (CMS/HCC)   (+) Peripheral arterial occlusive disease (CMS/HCC)   (+) Thoracic aortic aneurysm (CMS/HCC)   (+) Ventricular tachycardia (CMS/HCC)      Endocrine   (+) Class 1 obesity due to excess calories with body mass index (BMI) of 32.0 to 32.9 in adult      Infectious Disease   (+) Onychomycosis      Other   (+) Arthritis       Clinical information reviewed:    Allergies  Meds               NPO Detail:  NPO/Void Status  Date of Last Liquid: 03/05/24  Time of Last Liquid: 2350  Date of Last Solid: 03/02/24         Physical Exam    Airway  Mallampati: I  TM distance: >3 FB  Neck ROM: full     Cardiovascular - normal exam     Dental - normal exam     Pulmonary - normal exam     Abdominal - normal exam             Anesthesia Plan    History of general anesthesia?: yes  History of complications of general anesthesia?: no    ASA 3     MAC     The patient is not a current smoker.  Patient was previously instructed to abstain from smoking on day of procedure.  Patient did not smoke on day of procedure.    intravenous induction   Anesthetic plan and risks discussed with patient.  Use of blood products discussed with patient who consented to blood products.    Plan discussed with CRNA.

## 2024-03-06 NOTE — PERIOPERATIVE NURSING NOTE
Dr. García spoke with patient and daughter on findings and specific diet. Metamucil daily. Prevent constipation. Can have a regular diet.

## 2024-03-07 ENCOUNTER — HOSPITAL ENCOUNTER (OUTPATIENT)
Dept: RADIOLOGY | Facility: HOSPITAL | Age: 79
Discharge: HOME | DRG: 375 | End: 2024-03-07
Payer: MEDICARE

## 2024-03-07 ENCOUNTER — APPOINTMENT (OUTPATIENT)
Dept: RADIOLOGY | Facility: HOSPITAL | Age: 79
DRG: 375 | End: 2024-03-07
Payer: MEDICARE

## 2024-03-07 VITALS
TEMPERATURE: 98.4 F | HEART RATE: 91 BPM | RESPIRATION RATE: 24 BRPM | OXYGEN SATURATION: 93 % | DIASTOLIC BLOOD PRESSURE: 56 MMHG | WEIGHT: 181 LBS | BODY MASS INDEX: 28.41 KG/M2 | SYSTOLIC BLOOD PRESSURE: 103 MMHG | HEIGHT: 67 IN

## 2024-03-07 VITALS
DIASTOLIC BLOOD PRESSURE: 66 MMHG | SYSTOLIC BLOOD PRESSURE: 103 MMHG | TEMPERATURE: 98.6 F | OXYGEN SATURATION: 97 % | RESPIRATION RATE: 18 BRPM | HEART RATE: 81 BPM

## 2024-03-07 DIAGNOSIS — C79.9 METASTATIC MALIGNANT NEOPLASM, UNSPECIFIED SITE (MULTI): ICD-10-CM

## 2024-03-07 LAB
ALBUMIN SERPL BCP-MCNC: 3.4 G/DL (ref 3.4–5)
ANION GAP SERPL CALC-SCNC: 14 MMOL/L (ref 10–20)
BUN SERPL-MCNC: 14 MG/DL (ref 6–23)
CALCIUM SERPL-MCNC: 8.8 MG/DL (ref 8.6–10.3)
CHLORIDE SERPL-SCNC: 104 MMOL/L (ref 98–107)
CO2 SERPL-SCNC: 24 MMOL/L (ref 21–32)
CREAT SERPL-MCNC: 1.09 MG/DL (ref 0.5–1.3)
EGFRCR SERPLBLD CKD-EPI 2021: 69 ML/MIN/1.73M*2
ERYTHROCYTE [DISTWIDTH] IN BLOOD BY AUTOMATED COUNT: 16.1 % (ref 11.5–14.5)
GLUCOSE SERPL-MCNC: 85 MG/DL (ref 74–99)
HCT VFR BLD AUTO: 29.7 % (ref 41–52)
HGB BLD-MCNC: 8.6 G/DL (ref 13.5–17.5)
INR PPP: 1.4 (ref 0.9–1.1)
MCH RBC QN AUTO: 24.4 PG (ref 26–34)
MCHC RBC AUTO-ENTMCNC: 29 G/DL (ref 32–36)
MCV RBC AUTO: 84 FL (ref 80–100)
NRBC BLD-RTO: 0 /100 WBCS (ref 0–0)
PHOSPHATE SERPL-MCNC: 2.6 MG/DL (ref 2.5–4.9)
PLATELET # BLD AUTO: 323 X10*3/UL (ref 150–450)
POTASSIUM SERPL-SCNC: 3.9 MMOL/L (ref 3.5–5.3)
PROTHROMBIN TIME: 15.8 SECONDS (ref 9.8–12.8)
RBC # BLD AUTO: 3.52 X10*6/UL (ref 4.5–5.9)
SODIUM SERPL-SCNC: 138 MMOL/L (ref 136–145)
WBC # BLD AUTO: 7.7 X10*3/UL (ref 4.4–11.3)

## 2024-03-07 PROCEDURE — 88341 IMHCHEM/IMCYTCHM EA ADD ANTB: CPT

## 2024-03-07 PROCEDURE — 80069 RENAL FUNCTION PANEL: CPT

## 2024-03-07 PROCEDURE — 36415 COLL VENOUS BLD VENIPUNCTURE: CPT | Performed by: INTERNAL MEDICINE

## 2024-03-07 PROCEDURE — 2500000004 HC RX 250 GENERAL PHARMACY W/ HCPCS (ALT 636 FOR OP/ED): Performed by: RADIOLOGY

## 2024-03-07 PROCEDURE — 2500000001 HC RX 250 WO HCPCS SELF ADMINISTERED DRUGS (ALT 637 FOR MEDICARE OP): Performed by: INTERNAL MEDICINE

## 2024-03-07 PROCEDURE — 88342 IMHCHEM/IMCYTCHM 1ST ANTB: CPT

## 2024-03-07 PROCEDURE — 99239 HOSP IP/OBS DSCHRG MGMT >30: CPT

## 2024-03-07 PROCEDURE — 76942 ECHO GUIDE FOR BIOPSY: CPT

## 2024-03-07 PROCEDURE — 99152 MOD SED SAME PHYS/QHP 5/>YRS: CPT | Performed by: RADIOLOGY

## 2024-03-07 PROCEDURE — 99152 MOD SED SAME PHYS/QHP 5/>YRS: CPT

## 2024-03-07 PROCEDURE — 2500000002 HC RX 250 W HCPCS SELF ADMINISTERED DRUGS (ALT 637 FOR MEDICARE OP, ALT 636 FOR OP/ED): Mod: MUE | Performed by: INTERNAL MEDICINE

## 2024-03-07 PROCEDURE — 47000 NEEDLE BIOPSY OF LIVER PERQ: CPT | Performed by: RADIOLOGY

## 2024-03-07 PROCEDURE — 76942 ECHO GUIDE FOR BIOPSY: CPT | Performed by: RADIOLOGY

## 2024-03-07 PROCEDURE — 88307 TISSUE EXAM BY PATHOLOGIST: CPT

## 2024-03-07 PROCEDURE — 85027 COMPLETE CBC AUTOMATED: CPT | Performed by: INTERNAL MEDICINE

## 2024-03-07 PROCEDURE — 2500000004 HC RX 250 GENERAL PHARMACY W/ HCPCS (ALT 636 FOR OP/ED): Performed by: INTERNAL MEDICINE

## 2024-03-07 PROCEDURE — 85610 PROTHROMBIN TIME: CPT | Performed by: INTERNAL MEDICINE

## 2024-03-07 PROCEDURE — 88341 IMHCHEM/IMCYTCHM EA ADD ANTB: CPT | Mod: TC,PARLAB | Performed by: INTERNAL MEDICINE

## 2024-03-07 PROCEDURE — 2720000007 HC OR 272 NO HCPCS

## 2024-03-07 RX ORDER — SODIUM CHLORIDE 9 MG/ML
INJECTION, SOLUTION INTRAVENOUS CONTINUOUS PRN
Status: COMPLETED | OUTPATIENT
Start: 2024-03-07 | End: 2024-03-07

## 2024-03-07 RX ORDER — FENTANYL CITRATE 50 UG/ML
INJECTION, SOLUTION INTRAMUSCULAR; INTRAVENOUS
Status: COMPLETED | OUTPATIENT
Start: 2024-03-07 | End: 2024-03-07

## 2024-03-07 RX ORDER — PANTOPRAZOLE SODIUM 40 MG/1
40 TABLET, DELAYED RELEASE ORAL DAILY
Qty: 30 TABLET | Refills: 0 | Status: SHIPPED | OUTPATIENT
Start: 2024-03-07 | End: 2024-05-13 | Stop reason: SDUPTHER

## 2024-03-07 RX ORDER — LACTULOSE 10 G/15ML
20 SOLUTION ORAL DAILY PRN
Qty: 1 ML | Refills: 0 | Status: SHIPPED | OUTPATIENT
Start: 2024-03-07 | End: 2024-04-06

## 2024-03-07 RX ORDER — OXYCODONE AND ACETAMINOPHEN 5; 325 MG/1; MG/1
2 TABLET ORAL EVERY 6 HOURS PRN
Qty: 5 TABLET | Refills: 0 | Status: SHIPPED | OUTPATIENT
Start: 2024-03-07 | End: 2024-03-10

## 2024-03-07 RX ADMIN — OXYBUTYNIN CHLORIDE 5 MG: 5 TABLET ORAL at 09:16

## 2024-03-07 RX ADMIN — AMIODARONE HYDROCHLORIDE 200 MG: 200 TABLET ORAL at 09:16

## 2024-03-07 RX ADMIN — SODIUM CHLORIDE 50 ML/HR: 9 INJECTION, SOLUTION INTRAVENOUS at 14:10

## 2024-03-07 RX ADMIN — MAGNESIUM OXIDE TAB 400 MG (241.3 MG ELEMENTAL MG) 400 MG: 400 (241.3 MG) TAB at 09:16

## 2024-03-07 RX ADMIN — IRON SUCROSE 200 MG: 20 INJECTION, SOLUTION INTRAVENOUS at 09:20

## 2024-03-07 RX ADMIN — FENTANYL CITRATE 50 MCG: 50 INJECTION, SOLUTION INTRAMUSCULAR; INTRAVENOUS at 14:15

## 2024-03-07 RX ADMIN — METOPROLOL SUCCINATE 50 MG: 50 TABLET, EXTENDED RELEASE ORAL at 09:16

## 2024-03-07 RX ADMIN — DAPAGLIFLOZIN 10 MG: 10 TABLET, FILM COATED ORAL at 09:16

## 2024-03-07 ASSESSMENT — PAIN SCALES - WONG BAKER: WONGBAKER_NUMERICALRESPONSE: NO HURT

## 2024-03-07 ASSESSMENT — PAIN SCALES - GENERAL
PAINLEVEL_OUTOF10: 3
PAINLEVEL_OUTOF10: 0 - NO PAIN
PAINLEVEL_OUTOF10: 3
PAINLEVEL_OUTOF10: 0 - NO PAIN
PAINLEVEL_OUTOF10: 3
PAINLEVEL_OUTOF10: 3
PAINLEVEL_OUTOF10: 7
PAINLEVEL_OUTOF10: 0 - NO PAIN
PAINLEVEL_OUTOF10: 3
PAINLEVEL_OUTOF10: 5 - MODERATE PAIN
PAINLEVEL_OUTOF10: 3
PAINLEVEL_OUTOF10: 5 - MODERATE PAIN
PAINLEVEL_OUTOF10: 7
PAINLEVEL_OUTOF10: 3

## 2024-03-07 NOTE — PROGRESS NOTES
03/07/24 1247   Discharge Planning   Living Arrangements Spouse/significant other   Support Systems Spouse/significant other   Type of Residence Private residence   Patient expects to be discharged to: home   Does the patient need discharge transport arranged? No     Patient seen ambulating in his hospital room and in hallways with a steady gait.  Anticipate patient to return with outpatient follow up appointments.  Care Coordination team following for assistance with discharge planning as needed.  Joann GUNTER TCC

## 2024-03-07 NOTE — CARE PLAN
The patient's goals for the shift include no pain    The clinical goals for the shift include IR than discharge      Problem: Pain  Goal: My pain/discomfort is manageable  Outcome: Met     Problem: Safety  Goal: Patient will be injury free during hospitalization  Outcome: Met  Goal: I will remain free of falls  Outcome: Met     Problem: Daily Care  Goal: Daily care needs are met  Outcome: Met     Problem: Psychosocial Needs  Goal: Demonstrates ability to cope with hospitalization/illness  Outcome: Met  Goal: Collaborate with me, my family, and caregiver to identify my specific goals  Outcome: Met     Problem: Discharge Barriers  Goal: My discharge needs are met  Outcome: Met     Problem: Pain  Goal: Takes deep breaths with improved pain control throughout the shift  Outcome: Met  Goal: Turns in bed with improved pain control throughout the shift  Outcome: Met  Goal: Walks with improved pain control throughout the shift  Outcome: Met  Goal: Performs ADL's with improved pain control throughout shift  Outcome: Met  Goal: Participates in PT with improved pain control throughout the shift  Outcome: Met  Goal: Free from opioid side effects throughout the shift  Outcome: Met  Goal: Free from acute confusion related to pain meds throughout the shift  Outcome: Met     Problem: Pain - Adult  Goal: Verbalizes/displays adequate comfort level or baseline comfort level  Outcome: Met     Problem: Safety - Adult  Goal: Free from fall injury  Outcome: Met     Problem: Discharge Planning  Goal: Discharge to home or other facility with appropriate resources  Outcome: Met     Problem: Chronic Conditions and Co-morbidities  Goal: Patient's chronic conditions and co-morbidity symptoms are monitored and maintained or improved  Outcome: Met

## 2024-03-07 NOTE — POST-PROCEDURE NOTE
Interventional Radiology Brief Postprocedure Note    Attending: Oliver Daniels MD    Assistant:   Staff Role   No Staff Documented       Diagnosis:   1. Metastatic malignant neoplasm, unspecified site (CMS/HCC)  US guided needle liver biopsy    US guided needle liver biopsy          Description of procedure: US guided biopsy of a right segment 6 mass. Three 18G, 2.3 cm core samples obtained.     Timeout:  Yes    Procedure Area: Procedure Area     Anesthesia:   Local/Topical    Complications: None    Estimated Blood Loss: minimal    Medications (Filter: Administrations occurring from 1404 to 1431 on 03/07/24) As of 03/07/24 1431      fentaNYL PF (Sublimaze) injection (mcg) Total dose:  50 mcg      Date/Time Rate/Dose/Volume Action       03/07/24  1415 50 mcg Given               sodium chloride 0.9% infusion (mL/hr) Total volume:  Not documented*   *Total volume has not been documented. View each administration to see the amount administered.     Date/Time Rate/Dose/Volume Action       03/07/24  1410 50 mL/hr New Bag                       See detailed result report with images in PACS.    The patient tolerated the procedure well without incident or complication and is in stable condition.

## 2024-03-07 NOTE — DISCHARGE INSTRUCTIONS
Patient and daughter at bedside educated on including but not limited to post moderate sedation care and restrictions, post liver biopsy care, restrictions and site care, signs and symptoms to watch for and when to call provider or 911

## 2024-03-07 NOTE — DISCHARGE SUMMARY
Discharge Diagnosis  Suspected metastatic malignancy to the lung and liver with primary source   Anemia  Hypotension  HTN  HFrEF EF 25%  Cardiac amyloidosis  A-fib-on amiodarone Eliquis  SSS s/p ICD    Issues Requiring Follow-Up  Suspected metastatic malignancy to the lung and liver with primary source   Anemia  Hypotension    Follow-up liver biopsy results  Follow-up with hematology/oncology  Follow-up with gastroenterology    Discharge Meds     Your medication list        START taking these medications        Instructions Last Dose Given Next Dose Due   oxyCODONE-acetaminophen 5-325 mg tablet  Commonly known as: Percocet      Take 2 tablets by mouth every 6 hours if needed for severe pain (7 - 10) for up to 3 days.       pantoprazole 40 mg EC tablet  Commonly known as: ProtoNix      Take 1 tablet (40 mg) by mouth once daily. Do not crush, chew, or split.       psyllium 3.4 gram packet  Commonly known as: Metamucil      Take 1 packet by mouth 3 times a day. Mix and drink with at least 8 ounces of water or juice.              CHANGE how you take these medications        Instructions Last Dose Given Next Dose Due   apixaban 5 mg tablet  Commonly known as: Eliquis  Start taking on: March 10, 2024  What changed: These instructions start on March 10, 2024. If you are unsure what to do until then, ask your doctor or other care provider.      Take 1 tablet (5 mg) by mouth 2 times a day. Do not start before March 10, 2024.              CONTINUE taking these medications        Instructions Last Dose Given Next Dose Due   acetaminophen 325 mg tablet  Commonly known as: Tylenol           amiodarone 400 mg tablet  Commonly known as: Pacerone           Farxiga 10 mg  Generic drug: dapagliflozin propanediol      Take 1 tablet (10 mg) by mouth once daily.       magnesium oxide 400 mg (241.3 mg magnesium) tablet  Commonly known as: Mag-Ox           metoprolol succinate XL 50 mg 24 hr tablet  Commonly known as: Toprol-XL            nitroglycerin 0.4 mg SL tablet  Commonly known as: Nitrostat           oxybutynin XL 10 mg 24 hr tablet  Commonly known as: Ditropan-XL           polyethylene glycol 17 gram/dose powder  Commonly known as: Glycolax, Miralax           Vyndamax 61 mg capsule  Generic drug: tafamidis                  STOP taking these medications      Entresto 24-26 mg tablet  Generic drug: sacubitriL-valsartan        torsemide 20 mg tablet  Commonly known as: Demadex                  Where to Get Your Medications        These medications were sent to [id number] - [facility] [address] [facility]  [address] [id number]      Phone: [phone]   apixaban 5 mg tablet  oxyCODONE-acetaminophen 5-325 mg tablet  pantoprazole 40 mg EC tablet  psyllium 3.4 gram packet         Test Results Pending At Discharge  Pending Labs       No current pending labs.            Hospital Course  Oleg Yang is a 78-year-old male with past with history of SSS s/p ICD, HTN, HFrEF 2025%, cardiac amyloidosis, A-fib-on amiodarone Eliquis, presented to the hospital severe abdominal pain that was located in lower abdomen.  CTAP showed new widely spread metastatic disease to lungs liver retroperitoneal retrocrural adenopathy suspicious for metastatic colon disease.  Gastroenterology team reviewed him and he underwent an EGD and colonoscopy.  Colonoscopy showed fungating, invasive, malignant appearing polypoid ulcerated mass in the ascending colon.  Biopsy was performed.  EGD showed mild localized friable nodular mucosa.  No bleeding seen.  Biopsy was also performed.  We also schedule him for outpatient liver biopsy.  Hematologist oncology will follow-up with him in outpatient for plan going forward.  CA 19-9 and CEA was elevated.  Patient had low BP while he was inpatient. He also had low iron causing anemia where we replaced his iron with venofer.  We held his home torsemide and Entresto.  Will continue to hold his torsemide and  Entresto on discharge until he sees his PCP again and then can be reviewed.  We held his Eliquis for 48 hours and schedule him for an outpatient liver biopsy on 3/7/2024.  He may restart his Eliquis on 3/10/2024.  He feels well and is now being discharged to attend his biopsy with IR.     Pertinent Physical Exam At Time of Discharge  Physical Exam  General:  Pleasant and cooperative. No apparent distress.  HEENT:  Normocephalic, atraumatic, mucus membranes moist.   Neck:  Trachea midline.  No JVD.    Chest:  Clear to auscultation bilaterally. No wheezes, rales, or rhonchi.  CV:  Regular rate and rhythm.  Positive S1/S2.  Left-sided implant.  Abdomen: Bowel sounds present in all four quadrants,.  Generalized abdominal tenderness  Extremities:  No lower extremity edema or cyanosis.   Neurological:  AAOx3. No focal deficits.  Skin:  Warm and dry.   Outpatient Follow-Up  Future Appointments   Date Time Provider Department Center   3/15/2024 10:00 AM Rell Nava MD [id number] [facility]   4/11/2024  7:00 AM [facility] 3 DEVICE REMOTE [id number] [facility]   5/13/2024  1:00 PM Mina Melara MD [id number] [place]   7/1/2024  8:00 AM [facility] 3 ECHO ROOM 1 [id number] [facility]   7/8/2024  8:20 AM Jose L Gonzalez MD [id number] [place]   10/9/2024  9:45 AM James C Ramicone, DO [id number] [place]         Jayjay Storey MD  PGY1 internal medicine

## 2024-03-07 NOTE — PRE-PROCEDURE NOTE
Interventional Radiology Preprocedure Note    Indication for procedure: The encounter diagnosis was Metastatic malignant neoplasm, unspecified site (CMS/HCC).    Relevant review of systems: NA    Relevant Labs:   Lab Results   Component Value Date    CREATININE 1.09 03/07/2024    EGFR 69 03/07/2024    INR 1.4 (H) 03/07/2024    PROTIME 15.8 (H) 03/07/2024       Planned Sedation/Anesthesia: Moderate    Airway assessment: normal    Directed physical examination:    A-fib, lungs CTA-B    Mallampati: III (soft and hard palate and base of uvula visible)    ASA Score: ASA 2 - Patient with mild systemic disease with no functional limitations    Benefits, risks and alternatives of procedure and planned sedation have been discussed with the patient and/or their representative. All questions answered and they agree to proceed.

## 2024-03-07 NOTE — DISCHARGE INSTRUCTIONS
Thank you for attending [facility]    Please do not take your Entresto and Torsemide until you follow up with your primary physician in 1 week. Your primary care physician will decide on restarting these medications. These medications were held in the hospital due to low blood pressure.  Please keegan restart your Eliquis on 3/10/2024.  After you obtain your liver biopsy, the hematology/oncology team will follow up with you on your results  Please follow up with you primary care physician in 1 week.

## 2024-03-08 ENCOUNTER — DOCUMENTATION (OUTPATIENT)
Dept: PRIMARY CARE | Facility: CLINIC | Age: 79
End: 2024-03-08
Payer: MEDICARE

## 2024-03-08 ENCOUNTER — PATIENT OUTREACH (OUTPATIENT)
Dept: PRIMARY CARE | Facility: CLINIC | Age: 79
End: 2024-03-08
Payer: MEDICARE

## 2024-03-08 NOTE — PROGRESS NOTES
Discharge facility: [facility]  Discharge diagnosis:  Metastatic malignant neoplasm, unspecified site (CMS/HCC)  Anemia  Hypotension  HTN  HFrEF EF 25%  Cardiac amyloidosis  A-fib-on amiodarone Eliquis  SSS s/p ICD    Admission date: 3-4-24  Discharge date: 3-7-24    PCP Appointment Date: no available post hospital appts within 14 days, message to office  Specialist Appointment Date: 3-15-24, hem/onc,4-11-24 cardiology.5-13-24 GI, 5-9-24 ortho  Hospital Encounter and Summary: Linked    See Discharge assessment below for further details    Engagement  Call Start Time: 0930 (3/8/2024  9:36 AM)    Medications  Medications reviewed with patient/caregiver?: Yes (3/8/2024  9:36 AM)  Is the patient having any side effects they believe may be caused by any medication additions or changes?: No (3/8/2024  9:36 AM)  Does the patient have all medications ordered at discharge?: Yes (3/8/2024  9:36 AM)  Care Management Interventions: Provided patient education (3/8/2024  9:36 AM)  Is the patient taking all medications as directed (includes completed medication regime)?: Yes (3/8/2024  9:36 AM)  Care Management Interventions: Provided patient education (3/8/2024  9:36 AM)  Medication Comments: Instructe on new/changed/discontinued  medications of START taking:  oxyCODONE-acetaminophen (Percocet)  pantoprazole (ProtoNix)  psyllium (Metamucil)  CHANGE how you take:  apixaban (Eliquis)  STOP taking:  Entresto 24-26 mg tablet (sacubitriL-valsartan)  torsemide 20 mg tablet (Demadex) (3/8/2024  9:36 AM)  Follow Up Tasks: -- (n/a) (3/8/2024  9:36 AM)    Appointments  Does the patient have a primary care provider?: Yes (3/8/2024  9:36 AM)  Care Management Interventions: Advised patient to make appointment; Educated patient on importance of making appointment (3/8/2024  9:36 AM)  Has the patient kept scheduled appointments due by today?: Yes (3/8/2024  9:36 AM)  Care Management Interventions: -- (Encouraged to schedule appt  within 1 week per discharge instructions) (3/8/2024  9:36 AM)  Follow Up Tasks: -- (n/a) (3/8/2024  9:36 AM)    Self Management  What is the home health agency?: n/a (3/8/2024  9:36 AM)  Has home health visited the patient within 72 hours of discharge?: Not applicable (3/8/2024  9:36 AM)  Home Health Interventions: -- (n/a) (3/8/2024  9:36 AM)  What Durable Medical Equipment (DME) was ordered?: n/a (3/8/2024  9:36 AM)  Has all Durable Medical Equipment (DME) been delivered?: -- (n/a) (3/8/2024  9:36 AM)  DME Interventions: -- (n/a) (3/8/2024  9:36 AM)  Care Management Interventions: Notified PCP/provider (3/8/2024  9:36 AM)  Follow Up Tasks: -- (n/a) (3/8/2024  9:36 AM)    Patient Teaching  Does the patient have access to their discharge instructions?: Yes (3/8/2024  9:36 AM)  Care Management Interventions: Reviewed instructions with patient (3/8/2024  9:36 AM)  What is the patient's perception of their health status since discharge?: Improving (3/8/2024  9:36 AM)  Is the patient/caregiver able to teach back the hierarchy of who to call/visit for symptoms/problems? PCP, Specialist, Home Health nurse, Urgent Care, ED, 911: Yes (3/8/2024  9:36 AM)  If the patient is a current smoker, are they able to teach back resources for cessation?: -- (n/a) (3/8/2024  9:36 AM)  Patient/Caregiver Education Comments: Instructed on hospital discharge instructions. Instructed on new and changed medications. Instructed if increased shortness of breath or chest pains to call 911. Provided my contact information and encouraged to call with any questions. (3/8/2024  9:36 AM)    Wrap Up  Is the patient/caregiver familiar with Advance Care Planning?: Yes (3/8/2024  9:36 AM)  Would the patient like more information on Advance Care Planning?: No (3/8/2024  9:36 AM)  Call End Time: 0940 (3/8/2024  9:36 AM)

## 2024-03-11 ENCOUNTER — APPOINTMENT (OUTPATIENT)
Dept: RADIOLOGY | Facility: HOSPITAL | Age: 79
End: 2024-03-11
Payer: MEDICARE

## 2024-03-12 LAB
LAB AP ASR DISCLAIMER: NORMAL
LAB AP ASR DISCLAIMER: NORMAL
LABORATORY COMMENT REPORT: NORMAL
LABORATORY COMMENT REPORT: NORMAL
PATH REPORT.ADDENDUM SPEC: NORMAL
PATH REPORT.ADDENDUM SPEC: NORMAL
PATH REPORT.COMMENTS IMP SPEC: NORMAL
PATH REPORT.FINAL DX SPEC: NORMAL
PATH REPORT.FINAL DX SPEC: NORMAL
PATH REPORT.GROSS SPEC: NORMAL
PATH REPORT.GROSS SPEC: NORMAL
PATH REPORT.RELEVANT HX SPEC: NORMAL
PATH REPORT.RELEVANT HX SPEC: NORMAL
PATH REPORT.TOTAL CANCER: NORMAL
PATH REPORT.TOTAL CANCER: NORMAL

## 2024-03-15 ENCOUNTER — OFFICE VISIT (OUTPATIENT)
Dept: HEMATOLOGY/ONCOLOGY | Facility: CLINIC | Age: 79
End: 2024-03-15
Payer: MEDICARE

## 2024-03-15 VITALS
WEIGHT: 181.66 LBS | TEMPERATURE: 97.3 F | RESPIRATION RATE: 18 BRPM | SYSTOLIC BLOOD PRESSURE: 114 MMHG | OXYGEN SATURATION: 98 % | DIASTOLIC BLOOD PRESSURE: 69 MMHG | HEART RATE: 88 BPM | BODY MASS INDEX: 28.45 KG/M2

## 2024-03-15 DIAGNOSIS — C18.3 MALIGNANT NEOPLASM OF HEPATIC FLEXURE (MULTI): Primary | ICD-10-CM

## 2024-03-15 DIAGNOSIS — R93.3 ABNORMAL CT SCAN, COLON: ICD-10-CM

## 2024-03-15 DIAGNOSIS — I50.22 CHRONIC SYSTOLIC (CONGESTIVE) HEART FAILURE (MULTI): Primary | ICD-10-CM

## 2024-03-15 DIAGNOSIS — C79.9 METASTATIC MALIGNANT NEOPLASM, UNSPECIFIED SITE (MULTI): ICD-10-CM

## 2024-03-15 LAB — TEST COMMENT - SURGICAL SENDOUT REQUEST: NORMAL

## 2024-03-15 PROCEDURE — 1125F AMNT PAIN NOTED PAIN PRSNT: CPT | Performed by: INTERNAL MEDICINE

## 2024-03-15 PROCEDURE — 1036F TOBACCO NON-USER: CPT | Performed by: INTERNAL MEDICINE

## 2024-03-15 PROCEDURE — 1159F MED LIST DOCD IN RCRD: CPT | Performed by: INTERNAL MEDICINE

## 2024-03-15 PROCEDURE — 3074F SYST BP LT 130 MM HG: CPT | Performed by: INTERNAL MEDICINE

## 2024-03-15 PROCEDURE — 1111F DSCHRG MED/CURRENT MED MERGE: CPT | Performed by: INTERNAL MEDICINE

## 2024-03-15 PROCEDURE — 3078F DIAST BP <80 MM HG: CPT | Performed by: INTERNAL MEDICINE

## 2024-03-15 PROCEDURE — 99215 OFFICE O/P EST HI 40 MIN: CPT | Performed by: INTERNAL MEDICINE

## 2024-03-15 PROCEDURE — 1160F RVW MEDS BY RX/DR IN RCRD: CPT | Performed by: INTERNAL MEDICINE

## 2024-03-15 RX ORDER — HEPARIN 100 UNIT/ML
500 SYRINGE INTRAVENOUS AS NEEDED
Status: CANCELLED | OUTPATIENT
Start: 2024-03-15

## 2024-03-15 RX ORDER — FAMOTIDINE 10 MG/ML
20 INJECTION INTRAVENOUS ONCE AS NEEDED
Status: CANCELLED | OUTPATIENT
Start: 2024-03-15

## 2024-03-15 RX ORDER — ALBUTEROL SULFATE 0.83 MG/ML
3 SOLUTION RESPIRATORY (INHALATION) AS NEEDED
Status: CANCELLED | OUTPATIENT
Start: 2024-03-15

## 2024-03-15 RX ORDER — DIPHENHYDRAMINE HYDROCHLORIDE 50 MG/ML
50 INJECTION INTRAMUSCULAR; INTRAVENOUS AS NEEDED
Status: CANCELLED | OUTPATIENT
Start: 2024-03-15

## 2024-03-15 RX ORDER — HEPARIN SODIUM,PORCINE/PF 10 UNIT/ML
50 SYRINGE (ML) INTRAVENOUS AS NEEDED
Status: CANCELLED | OUTPATIENT
Start: 2024-03-15

## 2024-03-15 RX ORDER — EPINEPHRINE 0.3 MG/.3ML
0.3 INJECTION SUBCUTANEOUS EVERY 5 MIN PRN
Status: CANCELLED | OUTPATIENT
Start: 2024-03-15

## 2024-03-15 ASSESSMENT — PATIENT HEALTH QUESTIONNAIRE - PHQ9
2. FEELING DOWN, DEPRESSED OR HOPELESS: MORE THAN HALF THE DAYS
SUM OF ALL RESPONSES TO PHQ QUESTIONS 1-9: 10
10. IF YOU CHECKED OFF ANY PROBLEMS, HOW DIFFICULT HAVE THESE PROBLEMS MADE IT FOR YOU TO DO YOUR WORK, TAKE CARE OF THINGS AT HOME, OR GET ALONG WITH OTHER PEOPLE: SOMEWHAT DIFFICULT
6. FEELING BAD ABOUT YOURSELF - OR THAT YOU ARE A FAILURE OR HAVE LET YOURSELF OR YOUR FAMILY DOWN: NOT AT ALL
5. POOR APPETITE OR OVEREATING: NEARLY EVERY DAY
4. FEELING TIRED OR HAVING LITTLE ENERGY: NEARLY EVERY DAY
3. TROUBLE FALLING OR STAYING ASLEEP OR SLEEPING TOO MUCH: SEVERAL DAYS
SUM OF ALL RESPONSES TO PHQ9 QUESTIONS 1 AND 2: 3
7. TROUBLE CONCENTRATING ON THINGS, SUCH AS READING THE NEWSPAPER OR WATCHING TELEVISION: NOT AT ALL
9. THOUGHTS THAT YOU WOULD BE BETTER OFF DEAD, OR OF HURTING YOURSELF: NOT AT ALL
8. MOVING OR SPEAKING SO SLOWLY THAT OTHER PEOPLE COULD HAVE NOTICED. OR THE OPPOSITE, BEING SO FIGETY OR RESTLESS THAT YOU HAVE BEEN MOVING AROUND A LOT MORE THAN USUAL: NOT AT ALL
1. LITTLE INTEREST OR PLEASURE IN DOING THINGS: SEVERAL DAYS

## 2024-03-15 ASSESSMENT — PAIN SCALES - GENERAL: PAINLEVEL: 6

## 2024-03-15 NOTE — PROGRESS NOTES
Patient presented with daughter, Marcela, to [place] due to scheduling conflict at [facility] to see Dr. Nava.   Patient and daughter wish for all follow up, treatment, testing etc to be done at [facility] or [facility].  Dr. Nava business cards to patient and daughter.  For Port placement- will d/w Dr. Matias nurse partner at [facility] for their process. PI sheet given and instructed.  For Feraheme- PI sheet given and med reviewed.  For PET- instructed.  Follow up in 3 weeks. Patient and daughter verbalized understanding.  Referral to Dr. Ross for port placement sent. NGS testing request sent to  pathology per LANEY Juan RN.

## 2024-03-15 NOTE — PROGRESS NOTES
A colonoscopy on March 6, 2024 revealed a large ulcerating mass and hepatic flexure.  Biopsy consistent with adenocarcinoma.  Liver, right lobe liver biopsy on March 7, 2024 revealed moderately differentiated adenocarcinoma involving the liver compatible with colorectal origin.  Protein:  Result     MLH-1:  Expression Present                                    PMS-2: Expression Present                                    MSH-2: Expression Present                                    MSH-6: Expression Present     .  .7 on March 3, 2024.  CA 19-9 23,616 also on March 3, 2024  Oleg Yang is a 78 y.o. male with past medical history of ASHD, HTN, HFrEF (with ICD; EF 20-25%), Afib (on eliquis) who presented to [facility] ED on 3/3/24 with chest and abdominal pain. On 3/02/24 morning, patient mentions waking up around 1am to a sensation of being shocked. His legs jolted up and then he had some chest pain. He also endorses severe abdominal pain which he describes as being 10/10 and diffuse.     In the ED, vitals were stable. Work-up significant for , trop 62. CTAP showed widespread metastatic disease to lungs, liver, and retroperitoneal and retrocrural adenopathy, suspicious for metastatic colon cancer. Heme/Onc was consulted for evaluation of suspected metastatic colon cancer.     On evaluation today, vitals significant for SBP in the 80s. Interval labs notable for Cr 1.42 (from 1.32; baseline around ~1.2), Hgb 8.0 (baseline ~13), RDW elevated to 16.3. Tumor markers significant for CA 19-9 elevated to 23,616, .5, and AFP <4. CTAP read by Radiologist as showing findings compatible with widespread metastatic disease including in the lungs  with numerous hepatic metastatic disease also retroperitoneal and retrocrural adenopathy is seen. Left upper quadrant soft tissue mass seen between the stomach in the liver. Findings favor metastatic colon carcinoma given the appearance. GI has been consulted as well, and  "advised IR biopsy of hepatic lesion. Patient mentions that he has no family history of cancer. He mentions that he has never smoked as well. He endorses some weight loss of around 10 pounds over the last year; however, he mentions that his weight fluctuates a lot due to his heart failure. He mentions that his symptoms began around Friday (03/02/24) with generalized vague abdominal pain and nausea. He has not been constipated and mentions that his most recent bowel movement had no signs of blood and was brown.    The patient and family had come for follow-up on March 14, 2024 regarding history of metastatic adenocarcinoma of colon with liver and lung mets.  The patient complains of easy fatigability requesting assistance.     A 10 point ROS was completed and is negative expect as stated in HPI.      Past Medical History: As stated above     Past Surgical History: As stated above; knee surgery (06/2021), total hip replacement (06/2021)     Social History: No tobacco use, social drinker (1 drink/week), no illicit drug use,      Family History: Mother and Father - Heart Disease;      Objective:     BP 85/52 (Patient Position: Sitting)   Pulse 82   Temp 36.7 °C (98.1 °F)   Resp 18   Ht 1.702 m (5' 7\")   Wt 82.1 kg (181 lb)   SpO2 96%   BMI 28.35 kg/m²      Physical Exam:  GEN: conversant, NAD  HEENT: PERRL, EOMI, MMM OP clear, TMs clear bilaterally  NECK: supple, no cervical LAD, no carotid bruits  CV: S1, S2, regular, no murmur  PULM: CTAB  ABD: soft, NT, ND, BS+, mild tenderness to palpation in lower quadrants.  EXT: no LE edema  NEURO: no gross focal deficits  PSYCH: appropriate affect      Assessment/Plan:  Oleg Yang is a 78 y.o. male with past medical history of ASHD, HTN, HFrEF (with ICD; EF 20-25%), Afib (on eliquis) who presented to [facility] ED on 3/3/24 with chest and abdominal pain. Heme/Onc was consulted for evaluation of suspected metastatic colon cancer.    Metastatic adenocarcinoma of colon, .7 " on March 3, 2024.    Recommend PET scan, molecular studies on the biopsy specimen, consider port placement if agreeable for chemotherapy.    Severe iron deficiency anemia:    Recommend Feraheme 510 mg weekly x 2 weeks.  Effect side effects explained.  Return in 3 weeks.

## 2024-03-16 LAB
ATRIAL RATE: 89 BPM
P AXIS: 46 DEGREES
Q ONSET: 205 MS
QRS COUNT: 15 BEATS
QRS DURATION: 108 MS
QT INTERVAL: 434 MS
QTC CALCULATION(BAZETT): 528 MS
QTC FREDERICIA: 494 MS
R AXIS: -87 DEGREES
T AXIS: 70 DEGREES
T OFFSET: 422 MS
VENTRICULAR RATE: 89 BPM

## 2024-03-19 ENCOUNTER — TELEPHONE (OUTPATIENT)
Dept: SURGERY | Facility: CLINIC | Age: 79
End: 2024-03-19
Payer: MEDICARE

## 2024-03-19 NOTE — DOCUMENTATION CLARIFICATION NOTE
"    PATIENT:               SAEID JORDAN  ACCT #:                  [id number]  MRN:                       [id number]  :                       1945  ADMIT DATE:       3/3/2024 4:32 PM  DISCH DATE:        3/7/2024 1:06 PM  RESPONDING PROVIDER #:        [id number]          PROVIDER RESPONSE TEXT:    I concur with the pathology report findings and they are clinically significant    CDI QUERY TEXT:    [facility]_Path Results Simple      Instruction:    Based on your assessment of the patient and the clinical information, please provide the requested documentation by clicking on the appropriate radio button and enter any additional information if prompted.    Question: Please document whether you concur or do not concur with the pathology report findings    When answering this query, please exercise your independent professional judgment. The fact that a question is being asked, does not imply that any particular answer is desired or expected.    The patient's clinical indicators include:  Clinical Information: 78 year old male presented with  severe lower abdominal pain.    Clinical Indicators and Pathology Findings:  EGD and Colonoscopy on 3/6/24    \"COLON, RIGHT, MASS, BIOPSY:  Invasive adenocarcinoma, moderately differentiated.\"    Treatment:  s/p Colonoscopy:  \"showed fungating, invasive, malignant appearing polypoid ulcerated mass in the ascending colon.  Biopsy was performed.\" per 3/7/24 Discharge Summary    Risk Factors:  none noted  Options provided:  -- I concur with the pathology report findings and they are clinically significant  -- I do not concur with the pathology report findings  -- Other - I will add my own diagnosis  -- Refer to Clinical Documentation Reviewer    Query created by: Tati Manley on 3/19/2024 2:40 PM      Electronically signed by:  PARKER LIVE DO 3/19/2024 7:23 PM          "

## 2024-03-19 NOTE — TELEPHONE ENCOUNTER
Spoke with relative for patient about schedule apt with Dr. Ross and was ask to call back later today when patient is home.

## 2024-03-20 DIAGNOSIS — C18.3 MALIGNANT NEOPLASM OF HEPATIC FLEXURE (MULTI): ICD-10-CM

## 2024-03-21 ENCOUNTER — OFFICE VISIT (OUTPATIENT)
Dept: PRIMARY CARE | Facility: CLINIC | Age: 79
End: 2024-03-21
Payer: MEDICARE

## 2024-03-21 ENCOUNTER — TELEPHONE (OUTPATIENT)
Dept: SURGERY | Facility: CLINIC | Age: 79
End: 2024-03-21

## 2024-03-21 VITALS
DIASTOLIC BLOOD PRESSURE: 65 MMHG | SYSTOLIC BLOOD PRESSURE: 110 MMHG | WEIGHT: 183 LBS | HEIGHT: 67 IN | HEART RATE: 86 BPM | BODY MASS INDEX: 28.72 KG/M2 | OXYGEN SATURATION: 95 %

## 2024-03-21 DIAGNOSIS — I50.9 CHRONIC CONGESTIVE HEART FAILURE, UNSPECIFIED HEART FAILURE TYPE (MULTI): Primary | ICD-10-CM

## 2024-03-21 DIAGNOSIS — C18.9 MALIGNANT NEOPLASM OF COLON, UNSPECIFIED PART OF COLON (MULTI): ICD-10-CM

## 2024-03-21 PROCEDURE — 1159F MED LIST DOCD IN RCRD: CPT | Performed by: FAMILY MEDICINE

## 2024-03-21 PROCEDURE — 1160F RVW MEDS BY RX/DR IN RCRD: CPT | Performed by: FAMILY MEDICINE

## 2024-03-21 PROCEDURE — 99495 TRANSJ CARE MGMT MOD F2F 14D: CPT | Performed by: FAMILY MEDICINE

## 2024-03-21 PROCEDURE — 3074F SYST BP LT 130 MM HG: CPT | Performed by: FAMILY MEDICINE

## 2024-03-21 PROCEDURE — 1111F DSCHRG MED/CURRENT MED MERGE: CPT | Performed by: FAMILY MEDICINE

## 2024-03-21 PROCEDURE — 3078F DIAST BP <80 MM HG: CPT | Performed by: FAMILY MEDICINE

## 2024-03-21 PROCEDURE — 1036F TOBACCO NON-USER: CPT | Performed by: FAMILY MEDICINE

## 2024-03-21 RX ORDER — TRAMADOL HYDROCHLORIDE 50 MG/1
50 TABLET ORAL EVERY 8 HOURS PRN
Qty: 90 TABLET | Refills: 0 | Status: SHIPPED | OUTPATIENT
Start: 2024-03-21 | End: 2024-04-20

## 2024-03-21 RX ORDER — TORSEMIDE 10 MG/1
10 TABLET ORAL DAILY
Qty: 30 TABLET | Refills: 11 | Status: SHIPPED | OUTPATIENT
Start: 2024-03-21 | End: 2024-05-19 | Stop reason: HOSPADM

## 2024-03-21 NOTE — TELEPHONE ENCOUNTER
Reached out to patient about visit at [facility] location which patient has no transportation there. Reached out to Dr. Ross on when to schedule patient in [place]

## 2024-03-21 NOTE — PROGRESS NOTES
Subjective   Patient ID: Oleg Yang is a 78 y.o. male who presents for Hospital Follow-up.  HPI  Patient in for follow-up in the hospital patient had extensive workup and what ended up is he has stage IV colon cancer patient had surgical intervention needs to start with chemotherapy patient is going to get a port put in in the next couple weeks and started on a chemotherapeutic regime.  We discussed alternatives patient wanted to talk about prognostic indicators.  At this point patient he is can go ahead with the chemotherapy and we will follow-up for palliative care with us  Review of Systems   Constitutional:  Positive for appetite change, fatigue and unexpected weight change.   HENT: Negative.     Respiratory: Negative.     Cardiovascular: Negative.    Gastrointestinal:  Positive for abdominal pain and nausea. Negative for blood in stool, constipation and vomiting.   Genitourinary: Negative.    Musculoskeletal: Negative.    Neurological: Negative.        Objective   Physical Exam  Constitutional:       Appearance: He is ill-appearing.   HENT:      Head: Normocephalic and atraumatic.      Nose: Nose normal.      Mouth/Throat:      Mouth: Mucous membranes are moist.   Eyes:      Extraocular Movements: Extraocular movements intact.      Pupils: Pupils are equal, round, and reactive to light.   Cardiovascular:      Rate and Rhythm: Normal rate and regular rhythm.      Pulses: Normal pulses.      Heart sounds: Normal heart sounds.   Pulmonary:      Effort: Pulmonary effort is normal.      Breath sounds: Normal breath sounds.   Musculoskeletal:      Cervical back: Normal range of motion.   Skin:     General: Skin is warm and dry.   Neurological:      Mental Status: He is alert.         Assessment/Plan   Problem List Items Addressed This Visit    None  Visit Diagnoses         Codes    Chronic congestive heart failure, unspecified heart failure type (CMS/HCC)    -  Primary I50.9    Relevant Medications    torsemide  (Demadex) 10 mg tablet    Malignant neoplasm of colon, unspecified part of colon (CMS/HCC)     C18.9    Relevant Medications    traMADol (Ultram) 50 mg tablet                 Vimal Salmeron DO 03/21/24 11:00 AM

## 2024-03-22 ENCOUNTER — PATIENT OUTREACH (OUTPATIENT)
Dept: PRIMARY CARE | Facility: CLINIC | Age: 79
End: 2024-03-22
Payer: MEDICARE

## 2024-03-22 ENCOUNTER — INFUSION (OUTPATIENT)
Dept: HEMATOLOGY/ONCOLOGY | Facility: CLINIC | Age: 79
End: 2024-03-22
Payer: MEDICARE

## 2024-03-22 VITALS
DIASTOLIC BLOOD PRESSURE: 55 MMHG | HEART RATE: 74 BPM | TEMPERATURE: 97.9 F | OXYGEN SATURATION: 97 % | RESPIRATION RATE: 18 BRPM | SYSTOLIC BLOOD PRESSURE: 95 MMHG

## 2024-03-22 DIAGNOSIS — C18.3 MALIGNANT NEOPLASM OF HEPATIC FLEXURE (MULTI): ICD-10-CM

## 2024-03-22 PROCEDURE — 96365 THER/PROPH/DIAG IV INF INIT: CPT | Mod: INF

## 2024-03-22 PROCEDURE — 2500000004 HC RX 250 GENERAL PHARMACY W/ HCPCS (ALT 636 FOR OP/ED): Performed by: INTERNAL MEDICINE

## 2024-03-22 RX ORDER — HEPARIN 100 UNIT/ML
500 SYRINGE INTRAVENOUS AS NEEDED
Status: DISCONTINUED | OUTPATIENT
Start: 2024-03-22 | End: 2024-03-22 | Stop reason: HOSPADM

## 2024-03-22 RX ORDER — HEPARIN 100 UNIT/ML
500 SYRINGE INTRAVENOUS AS NEEDED
Status: CANCELLED | OUTPATIENT
Start: 2024-03-22

## 2024-03-22 RX ORDER — HEPARIN SODIUM,PORCINE/PF 10 UNIT/ML
50 SYRINGE (ML) INTRAVENOUS AS NEEDED
Status: DISCONTINUED | OUTPATIENT
Start: 2024-03-22 | End: 2024-03-22 | Stop reason: HOSPADM

## 2024-03-22 RX ORDER — DIPHENHYDRAMINE HYDROCHLORIDE 50 MG/ML
50 INJECTION INTRAMUSCULAR; INTRAVENOUS AS NEEDED
Status: CANCELLED | OUTPATIENT
Start: 2024-03-29

## 2024-03-22 RX ORDER — HEPARIN SODIUM,PORCINE/PF 10 UNIT/ML
50 SYRINGE (ML) INTRAVENOUS AS NEEDED
Status: CANCELLED | OUTPATIENT
Start: 2024-03-22

## 2024-03-22 RX ORDER — FAMOTIDINE 10 MG/ML
20 INJECTION INTRAVENOUS ONCE AS NEEDED
Status: DISCONTINUED | OUTPATIENT
Start: 2024-03-22 | End: 2024-03-22 | Stop reason: HOSPADM

## 2024-03-22 RX ORDER — ALBUTEROL SULFATE 0.83 MG/ML
3 SOLUTION RESPIRATORY (INHALATION) AS NEEDED
Status: DISCONTINUED | OUTPATIENT
Start: 2024-03-22 | End: 2024-03-22 | Stop reason: HOSPADM

## 2024-03-22 RX ORDER — EPINEPHRINE 0.3 MG/.3ML
0.3 INJECTION SUBCUTANEOUS EVERY 5 MIN PRN
Status: DISCONTINUED | OUTPATIENT
Start: 2024-03-22 | End: 2024-03-22 | Stop reason: HOSPADM

## 2024-03-22 RX ORDER — ALBUTEROL SULFATE 0.83 MG/ML
3 SOLUTION RESPIRATORY (INHALATION) AS NEEDED
Status: CANCELLED | OUTPATIENT
Start: 2024-03-29

## 2024-03-22 RX ORDER — DIPHENHYDRAMINE HYDROCHLORIDE 50 MG/ML
50 INJECTION INTRAMUSCULAR; INTRAVENOUS AS NEEDED
Status: DISCONTINUED | OUTPATIENT
Start: 2024-03-22 | End: 2024-03-22 | Stop reason: HOSPADM

## 2024-03-22 RX ORDER — EPINEPHRINE 0.3 MG/.3ML
0.3 INJECTION SUBCUTANEOUS EVERY 5 MIN PRN
Status: CANCELLED | OUTPATIENT
Start: 2024-03-29

## 2024-03-22 RX ORDER — FAMOTIDINE 10 MG/ML
20 INJECTION INTRAVENOUS ONCE AS NEEDED
Status: CANCELLED | OUTPATIENT
Start: 2024-03-29

## 2024-03-22 RX ADMIN — FERUMOXYTOL 510 MG: 510 INJECTION INTRAVENOUS at 14:15

## 2024-03-22 ASSESSMENT — PAIN SCALES - GENERAL: PAINLEVEL: 6

## 2024-03-22 NOTE — PROGRESS NOTES
Call regarding appt. with PCP on 3-21-24 after hospitalization.  At time of outreach call the patient feels as if their condition has improved. He is going today for his first iron infusion . He is scheduled to go at the beginning of April to have port placed.  Reviewed the PCP appointment with the pt and addressed any questions or concerns.

## 2024-03-23 ASSESSMENT — ENCOUNTER SYMPTOMS
NAUSEA: 1
RESPIRATORY NEGATIVE: 1
CARDIOVASCULAR NEGATIVE: 1
UNEXPECTED WEIGHT CHANGE: 1
MUSCULOSKELETAL NEGATIVE: 1
CONSTIPATION: 0
FATIGUE: 1
VOMITING: 0
APPETITE CHANGE: 1
ABDOMINAL PAIN: 1
NEUROLOGICAL NEGATIVE: 1
BLOOD IN STOOL: 0

## 2024-03-25 ENCOUNTER — HOSPITAL ENCOUNTER (OUTPATIENT)
Dept: RADIOLOGY | Facility: CLINIC | Age: 79
Discharge: HOME | End: 2024-03-25
Payer: MEDICARE

## 2024-03-25 DIAGNOSIS — R93.3 ABNORMAL CT SCAN, COLON: ICD-10-CM

## 2024-03-25 DIAGNOSIS — C79.9 METASTATIC MALIGNANT NEOPLASM, UNSPECIFIED SITE (MULTI): ICD-10-CM

## 2024-03-25 DIAGNOSIS — C18.3 MALIGNANT NEOPLASM OF HEPATIC FLEXURE (MULTI): ICD-10-CM

## 2024-03-25 PROCEDURE — 78815 PET IMAGE W/CT SKULL-THIGH: CPT | Mod: PI

## 2024-03-25 PROCEDURE — 78815 PET IMAGE W/CT SKULL-THIGH: CPT | Mod: PET TUMOR INIT TX STRAT | Performed by: NUCLEAR MEDICINE

## 2024-03-25 RX ORDER — FLUDEOXYGLUCOSE F 18 200 MCI/ML
11.6 INJECTION, SOLUTION INTRAVENOUS
Status: COMPLETED | OUTPATIENT
Start: 2024-03-25 | End: 2024-03-25

## 2024-03-25 RX ADMIN — FLUDEOXYGLUCOSE F 18 11.6 MILLICURIE: 200 INJECTION, SOLUTION INTRAVENOUS at 11:12

## 2024-03-26 ENCOUNTER — APPOINTMENT (OUTPATIENT)
Dept: HEMATOLOGY/ONCOLOGY | Facility: CLINIC | Age: 79
End: 2024-03-26
Payer: MEDICARE

## 2024-03-29 ENCOUNTER — INFUSION (OUTPATIENT)
Dept: HEMATOLOGY/ONCOLOGY | Facility: CLINIC | Age: 79
End: 2024-03-29
Payer: MEDICARE

## 2024-03-29 VITALS
RESPIRATION RATE: 18 BRPM | DIASTOLIC BLOOD PRESSURE: 63 MMHG | TEMPERATURE: 97.9 F | HEART RATE: 74 BPM | OXYGEN SATURATION: 98 % | SYSTOLIC BLOOD PRESSURE: 110 MMHG

## 2024-03-29 DIAGNOSIS — C18.3 MALIGNANT NEOPLASM OF HEPATIC FLEXURE (MULTI): ICD-10-CM

## 2024-03-29 LAB
ELECTRONICALLY SIGNED BY: NORMAL
FOCUSED SOLID TUMOR DNA RESULTS: NORMAL

## 2024-03-29 PROCEDURE — 2500000004 HC RX 250 GENERAL PHARMACY W/ HCPCS (ALT 636 FOR OP/ED): Performed by: INTERNAL MEDICINE

## 2024-03-29 PROCEDURE — 96365 THER/PROPH/DIAG IV INF INIT: CPT | Mod: INF

## 2024-03-29 RX ORDER — HEPARIN SODIUM,PORCINE/PF 10 UNIT/ML
50 SYRINGE (ML) INTRAVENOUS AS NEEDED
Status: CANCELLED | OUTPATIENT
Start: 2024-03-29

## 2024-03-29 RX ORDER — EPINEPHRINE 0.3 MG/.3ML
0.3 INJECTION SUBCUTANEOUS EVERY 5 MIN PRN
OUTPATIENT
Start: 2024-03-29

## 2024-03-29 RX ORDER — ALBUTEROL SULFATE 0.83 MG/ML
3 SOLUTION RESPIRATORY (INHALATION) AS NEEDED
OUTPATIENT
Start: 2024-03-29

## 2024-03-29 RX ORDER — DIPHENHYDRAMINE HYDROCHLORIDE 50 MG/ML
50 INJECTION INTRAMUSCULAR; INTRAVENOUS AS NEEDED
OUTPATIENT
Start: 2024-03-29

## 2024-03-29 RX ORDER — HEPARIN 100 UNIT/ML
500 SYRINGE INTRAVENOUS AS NEEDED
Status: DISCONTINUED | OUTPATIENT
Start: 2024-03-29 | End: 2024-03-29 | Stop reason: HOSPADM

## 2024-03-29 RX ORDER — HEPARIN 100 UNIT/ML
500 SYRINGE INTRAVENOUS AS NEEDED
Status: CANCELLED | OUTPATIENT
Start: 2024-03-29

## 2024-03-29 RX ORDER — HEPARIN SODIUM,PORCINE/PF 10 UNIT/ML
50 SYRINGE (ML) INTRAVENOUS AS NEEDED
Status: DISCONTINUED | OUTPATIENT
Start: 2024-03-29 | End: 2024-03-29 | Stop reason: HOSPADM

## 2024-03-29 RX ORDER — FAMOTIDINE 10 MG/ML
20 INJECTION INTRAVENOUS ONCE AS NEEDED
OUTPATIENT
Start: 2024-03-29

## 2024-03-29 RX ADMIN — FERUMOXYTOL 510 MG: 510 INJECTION INTRAVENOUS at 14:18

## 2024-03-29 ASSESSMENT — PAIN SCALES - GENERAL
PAINLEVEL: 0-NO PAIN
PAINLEVEL_OUTOF10: 0-NO PAIN

## 2024-04-02 ENCOUNTER — OFFICE VISIT (OUTPATIENT)
Dept: HEMATOLOGY/ONCOLOGY | Facility: CLINIC | Age: 79
End: 2024-04-02
Payer: MEDICARE

## 2024-04-02 DIAGNOSIS — C79.9 METASTATIC MALIGNANT NEOPLASM, UNSPECIFIED SITE (MULTI): ICD-10-CM

## 2024-04-02 DIAGNOSIS — R93.3 ABNORMAL CT SCAN, COLON: ICD-10-CM

## 2024-04-02 DIAGNOSIS — C18.3 MALIGNANT NEOPLASM OF HEPATIC FLEXURE (MULTI): Primary | ICD-10-CM

## 2024-04-02 LAB — TEST COMMENT - SURGICAL SENDOUT REQUEST: NORMAL

## 2024-04-02 PROCEDURE — 1159F MED LIST DOCD IN RCRD: CPT | Performed by: INTERNAL MEDICINE

## 2024-04-02 PROCEDURE — 99215 OFFICE O/P EST HI 40 MIN: CPT | Performed by: INTERNAL MEDICINE

## 2024-04-02 PROCEDURE — 1111F DSCHRG MED/CURRENT MED MERGE: CPT | Performed by: INTERNAL MEDICINE

## 2024-04-02 PROCEDURE — 1160F RVW MEDS BY RX/DR IN RCRD: CPT | Performed by: INTERNAL MEDICINE

## 2024-04-02 PROCEDURE — 3078F DIAST BP <80 MM HG: CPT | Performed by: INTERNAL MEDICINE

## 2024-04-02 PROCEDURE — 1125F AMNT PAIN NOTED PAIN PRSNT: CPT | Performed by: INTERNAL MEDICINE

## 2024-04-02 PROCEDURE — 3074F SYST BP LT 130 MM HG: CPT | Performed by: INTERNAL MEDICINE

## 2024-04-02 PROCEDURE — 1036F TOBACCO NON-USER: CPT | Performed by: INTERNAL MEDICINE

## 2024-04-02 ASSESSMENT — PAIN SCALES - GENERAL: PAINLEVEL: 7

## 2024-04-02 NOTE — PROGRESS NOTES
A colonoscopy on March 6, 2024 revealed a large ulcerating mass and hepatic flexure.  Biopsy consistent with adenocarcinoma.  Liver, right lobe liver biopsy on March 7, 2024 revealed moderately differentiated adenocarcinoma involving the liver compatible with colorectal origin.  Protein:  Result     MLH-1:  Expression Present                                    PMS-2: Expression Present                                    MSH-2: Expression Present                                    MSH-6: Expression Present     .  .7 on March 3, 2024.  CA 19-9 23,616 also on March 3, 2024  Oleg Yang is a 78 y.o. male with past medical history of ASHD, HTN, HFrEF (with ICD; EF 20-25%), Afib (on eliquis) who presented to [facility] ED on 3/3/24 with chest and abdominal pain. On 3/02/24 morning, patient mentions waking up around 1am to a sensation of being shocked. His legs jolted up and then he had some chest pain. He also endorses severe abdominal pain which he describes as being 10/10 and diffuse.     In the ED, vitals were stable. Work-up significant for , trop 62. CTAP showed widespread metastatic disease to lungs, liver, and retroperitoneal and retrocrural adenopathy, suspicious for metastatic colon cancer. Heme/Onc was consulted for evaluation of suspected metastatic colon cancer.     On evaluation today, vitals significant for SBP in the 80s. Interval labs notable for Cr 1.42 (from 1.32; baseline around ~1.2), Hgb 8.0 (baseline ~13), RDW elevated to 16.3. Tumor markers significant for CA 19-9 elevated to 23,616, .5, and AFP <4. CTAP read by Radiologist as showing findings compatible with widespread metastatic disease including in the lungs  with numerous hepatic metastatic disease also retroperitoneal and retrocrural adenopathy is seen. Left upper quadrant soft tissue mass seen between the stomach in the liver. Findings favor metastatic colon carcinoma given the appearance. GI has been consulted as well, and  "advised IR biopsy of hepatic lesion. Patient mentions that he has no family history of cancer. He mentions that he has never smoked as well. He endorses some weight loss of around 10 pounds over the last year; however, he mentions that his weight fluctuates a lot due to his heart failure. He mentions that his symptoms began around Friday (03/02/24) with generalized vague abdominal pain and nausea. He has not been constipated and mentions that his most recent bowel movement had no signs of blood and was brown.    The patient and family had come for follow-up on April 2, 2024 regarding history of metastatic adenocarcinoma of colon with liver and lung mets.  The patient complains of easy fatigability requesting assistance.  Since last evaluation the patient did receive intravenous iron and is beginning to feel better.     A 10 point ROS was completed and is negative expect as stated in HPI.      Past Medical History: As stated above     Past Surgical History: As stated above; knee surgery (06/2021), total hip replacement (06/2021)     Social History: No tobacco use, social drinker (1 drink/week), no illicit drug use,      Family History: Mother and Father - Heart Disease;      Objective:     BP 85/52 (Patient Position: Sitting)   Pulse 82   Temp 36.7 °C (98.1 °F)   Resp 18   Ht 1.702 m (5' 7\")   Wt 82.1 kg (181 lb)   SpO2 96%   BMI 28.35 kg/m²      Physical Exam:  GEN: conversant, NAD  HEENT: PERRL, EOMI, MMM OP clear, TMs clear bilaterally  NECK: supple, no cervical LAD, no carotid bruits  CV: S1, S2, regular, no murmur  PULM: CTAB  ABD: soft, NT, ND, BS+, mild tenderness to palpation in lower quadrants.  EXT: no LE edema  NEURO: no gross focal deficits  PSYCH: appropriate affect      Assessment/Plan:  Oleg Yang is a 78 y.o. male with past medical history of ASHD, HTN, HFrEF (with ICD; EF 20-25%), Afib (on eliquis) [facility] and abdominal pain. Heme/Onc was consulted for " evaluation of suspected metastatic colon cancer.    Metastatic adenocarcinoma of colon, .7 on March 3, 2024.    Recommend PET scan, molecular studies on the biopsy specimen, consider port placement if agreeable for chemotherapy.    Severe iron deficiency anemia:    Recommend Feraheme 510 mg weekly x 2 weeks.  Effect side effects explained.  Return in 3 weeks.  The patient and family had come for follow-up on April 2, 2024 regarding history of widespread metastatic adenocarcinoma of colon with liver and lung mets.  The patient did have iron deficiency component for which she received intravenous Feraheme 510 mg/week x 2 weeks and is beginning to feel better.  A PET scan revealed ascending colon mass liver mets and lung mets.  I had a detailed discussion with the patient and family explained to them that options include do nothing, clinical trials, consider palliative chemotherapy with FOLFOX with Avastin.  I have recommended molecular studies as well.  After thinking through all the options the patient and family are agreeable to initiate chemotherapy with FOLFOX with Avastin at 25% dose reduction.  [id number] being placed on April 8, 2024.  Chemotherapy to begin after port placement and approval.  Return in 2 weeks.

## 2024-04-08 ENCOUNTER — HOSPITAL ENCOUNTER (OUTPATIENT)
Dept: RADIOLOGY | Facility: HOSPITAL | Age: 79
Discharge: HOME | End: 2024-04-08
Payer: MEDICARE

## 2024-04-08 VITALS
OXYGEN SATURATION: 98 % | TEMPERATURE: 97.9 F | HEART RATE: 78 BPM | DIASTOLIC BLOOD PRESSURE: 72 MMHG | SYSTOLIC BLOOD PRESSURE: 121 MMHG | RESPIRATION RATE: 18 BRPM

## 2024-04-08 DIAGNOSIS — C18.3 MALIGNANT NEOPLASM OF HEPATIC FLEXURE (MULTI): ICD-10-CM

## 2024-04-08 PROCEDURE — 2500000004 HC RX 250 GENERAL PHARMACY W/ HCPCS (ALT 636 FOR OP/ED): Performed by: RADIOLOGY

## 2024-04-08 PROCEDURE — 99153 MOD SED SAME PHYS/QHP EA: CPT | Performed by: RADIOLOGY

## 2024-04-08 PROCEDURE — C1788 PORT, INDWELLING, IMP: HCPCS

## 2024-04-08 PROCEDURE — 2500000005 HC RX 250 GENERAL PHARMACY W/O HCPCS: Performed by: RADIOLOGY

## 2024-04-08 PROCEDURE — 76937 US GUIDE VASCULAR ACCESS: CPT | Performed by: RADIOLOGY

## 2024-04-08 PROCEDURE — 99152 MOD SED SAME PHYS/QHP 5/>YRS: CPT

## 2024-04-08 PROCEDURE — 99153 MOD SED SAME PHYS/QHP EA: CPT

## 2024-04-08 PROCEDURE — 36561 INSERT TUNNELED CV CATH: CPT

## 2024-04-08 PROCEDURE — 2780000003 HC OR 278 NO HCPCS

## 2024-04-08 PROCEDURE — 77001 FLUOROGUIDE FOR VEIN DEVICE: CPT

## 2024-04-08 PROCEDURE — 77001 FLUOROGUIDE FOR VEIN DEVICE: CPT | Performed by: RADIOLOGY

## 2024-04-08 PROCEDURE — 36561 INSERT TUNNELED CV CATH: CPT | Performed by: RADIOLOGY

## 2024-04-08 PROCEDURE — 76937 US GUIDE VASCULAR ACCESS: CPT

## 2024-04-08 PROCEDURE — 99152 MOD SED SAME PHYS/QHP 5/>YRS: CPT | Performed by: RADIOLOGY

## 2024-04-08 RX ORDER — MIDAZOLAM HYDROCHLORIDE 1 MG/ML
INJECTION INTRAMUSCULAR; INTRAVENOUS
Status: COMPLETED | OUTPATIENT
Start: 2024-04-08 | End: 2024-04-08

## 2024-04-08 RX ORDER — SODIUM CHLORIDE 9 MG/ML
INJECTION, SOLUTION INTRAVENOUS CONTINUOUS PRN
Status: COMPLETED | OUTPATIENT
Start: 2024-04-08 | End: 2024-04-08

## 2024-04-08 RX ORDER — FENTANYL CITRATE 50 UG/ML
INJECTION, SOLUTION INTRAMUSCULAR; INTRAVENOUS
Status: COMPLETED | OUTPATIENT
Start: 2024-04-08 | End: 2024-04-08

## 2024-04-08 RX ADMIN — SODIUM CHLORIDE 50 ML/HR: 9 INJECTION, SOLUTION INTRAVENOUS at 08:35

## 2024-04-08 RX ADMIN — MIDAZOLAM HYDROCHLORIDE 1 MG: 1 INJECTION, SOLUTION INTRAMUSCULAR; INTRAVENOUS at 08:39

## 2024-04-08 RX ADMIN — Medication 3 L/MIN: at 08:30

## 2024-04-08 RX ADMIN — FENTANYL CITRATE 50 MCG: 50 INJECTION, SOLUTION INTRAMUSCULAR; INTRAVENOUS at 08:39

## 2024-04-08 ASSESSMENT — PAIN SCALES - GENERAL
PAINLEVEL_OUTOF10: 0 - NO PAIN

## 2024-04-08 ASSESSMENT — PAIN - FUNCTIONAL ASSESSMENT
PAIN_FUNCTIONAL_ASSESSMENT: 0-10

## 2024-04-08 NOTE — PROCEDURES
Interventional Radiology Brief Postprocedure Note    Attending: Mikie Velasquez MD    Assistant:   Staff Role   No Staff Documented       Diagnosis:   1. Malignant neoplasm of hepatic flexure (CMS/HCC)  IR CVC port placement    IR CVC port placement          Description of procedure: IR CVC port placement    Timeout:  Yes    Procedure Area: Procedure Area     Anesthesia:   Conscious Sedation    Complications: None    Estimated Blood Loss: none    Medications (Filter: Administrations occurring from 0834 to 0904 on 04/08/24) As of 04/08/24 0904      sodium chloride 0.9% infusion (mL/hr) Total volume:  Not documented*   *Total volume has not been documented. View each administration to see the amount administered.     Date/Time Rate/Dose/Volume Action       04/08/24  0835 50 mL/hr New Bag               fentaNYL PF (Sublimaze) injection (mcg) Total dose:  50 mcg      Date/Time Rate/Dose/Volume Action       04/08/24  0839 50 mcg Given               midazolam (Versed) injection (mg) Total dose:  1 mg      Date/Time Rate/Dose/Volume Action       04/08/24  0839 1 mg Given                   No specimens collected      See detailed result report with images in PACS.    The patient tolerated the procedure well without incident or complication and is in stable condition.

## 2024-04-08 NOTE — DISCHARGE INSTRUCTIONS
Home going paper instructions provided with verbalized understanding     - Moderate sedation'  - Port placement

## 2024-04-08 NOTE — PRE-PROCEDURE NOTE
Interventional Radiology Preprocedure Note    Indication for procedure: The encounter diagnosis was Malignant neoplasm of hepatic flexure (CMS/HCC).    Relevant review of systems: NA    Relevant Labs:   Lab Results   Component Value Date    CREATININE 1.09 03/07/2024    EGFR 69 03/07/2024    INR 1.4 (H) 03/07/2024    PROTIME 15.8 (H) 03/07/2024       Planned Sedation/Anesthesia: Minimal    Airway assessment: normal    Directed physical examination:    Aox3  No increased work of breathing.   No acute distress      Mallampati: II (hard and soft palate, upper portion of tonsils anduvula visible)    ASA Score: ASA 2 - Patient with mild systemic disease with no functional limitations    Benefits, risks and alternatives of procedure and planned sedation have been discussed with the patient and/or their representative. All questions answered and they agree to proceed.

## 2024-04-09 ENCOUNTER — TELEPHONE (OUTPATIENT)
Dept: SURGERY | Facility: CLINIC | Age: 79
End: 2024-04-09
Payer: MEDICARE

## 2024-04-09 NOTE — TELEPHONE ENCOUNTER
Spoke with patients wife and she confirmed patient already had port placement on 04/08/2024 by another provider and at this time appt with Dr. Ross can be canceled.

## 2024-04-10 ENCOUNTER — APPOINTMENT (OUTPATIENT)
Dept: SURGERY | Facility: CLINIC | Age: 79
End: 2024-04-10
Payer: MEDICARE

## 2024-04-10 DIAGNOSIS — C79.9 METASTATIC MALIGNANT NEOPLASM, UNSPECIFIED SITE (MULTI): ICD-10-CM

## 2024-04-10 DIAGNOSIS — C18.3 MALIGNANT NEOPLASM OF HEPATIC FLEXURE (MULTI): Primary | ICD-10-CM

## 2024-04-10 RX ORDER — LORAZEPAM 2 MG/ML
1 INJECTION INTRAMUSCULAR AS NEEDED
Status: CANCELLED | OUTPATIENT
Start: 2024-04-15

## 2024-04-10 RX ORDER — PALONOSETRON 0.05 MG/ML
0.25 INJECTION, SOLUTION INTRAVENOUS ONCE
Status: CANCELLED | OUTPATIENT
Start: 2024-04-29

## 2024-04-10 RX ORDER — EPINEPHRINE 0.3 MG/.3ML
0.3 INJECTION SUBCUTANEOUS EVERY 5 MIN PRN
Status: CANCELLED | OUTPATIENT
Start: 2024-04-15

## 2024-04-10 RX ORDER — FAMOTIDINE 10 MG/ML
20 INJECTION INTRAVENOUS ONCE AS NEEDED
Status: CANCELLED | OUTPATIENT
Start: 2024-04-15

## 2024-04-10 RX ORDER — ALBUTEROL SULFATE 0.83 MG/ML
3 SOLUTION RESPIRATORY (INHALATION) AS NEEDED
Status: CANCELLED | OUTPATIENT
Start: 2024-04-29

## 2024-04-10 RX ORDER — DIPHENHYDRAMINE HYDROCHLORIDE 50 MG/ML
50 INJECTION INTRAMUSCULAR; INTRAVENOUS AS NEEDED
Status: CANCELLED | OUTPATIENT
Start: 2024-04-29

## 2024-04-10 RX ORDER — PROCHLORPERAZINE EDISYLATE 5 MG/ML
10 INJECTION INTRAMUSCULAR; INTRAVENOUS EVERY 6 HOURS PRN
Status: CANCELLED | OUTPATIENT
Start: 2024-04-29

## 2024-04-10 RX ORDER — PALONOSETRON 0.05 MG/ML
0.25 INJECTION, SOLUTION INTRAVENOUS ONCE
Status: CANCELLED | OUTPATIENT
Start: 2024-04-15

## 2024-04-10 RX ORDER — FLUOROURACIL 50 MG/ML
300 INJECTION, SOLUTION INTRAVENOUS ONCE
Status: CANCELLED | OUTPATIENT
Start: 2024-04-29

## 2024-04-10 RX ORDER — PROCHLORPERAZINE MALEATE 5 MG
10 TABLET ORAL EVERY 6 HOURS PRN
Status: CANCELLED | OUTPATIENT
Start: 2024-04-15

## 2024-04-10 RX ORDER — LORAZEPAM 2 MG/ML
1 INJECTION INTRAMUSCULAR AS NEEDED
Status: CANCELLED | OUTPATIENT
Start: 2024-04-29

## 2024-04-10 RX ORDER — PROCHLORPERAZINE EDISYLATE 5 MG/ML
10 INJECTION INTRAMUSCULAR; INTRAVENOUS EVERY 6 HOURS PRN
Status: CANCELLED | OUTPATIENT
Start: 2024-04-15

## 2024-04-10 RX ORDER — PROCHLORPERAZINE MALEATE 5 MG
10 TABLET ORAL EVERY 6 HOURS PRN
Status: CANCELLED | OUTPATIENT
Start: 2024-04-29

## 2024-04-10 RX ORDER — FAMOTIDINE 10 MG/ML
20 INJECTION INTRAVENOUS ONCE AS NEEDED
Status: CANCELLED | OUTPATIENT
Start: 2024-04-29

## 2024-04-10 RX ORDER — ALBUTEROL SULFATE 0.83 MG/ML
3 SOLUTION RESPIRATORY (INHALATION) AS NEEDED
Status: CANCELLED | OUTPATIENT
Start: 2024-04-15

## 2024-04-10 RX ORDER — EPINEPHRINE 0.3 MG/.3ML
0.3 INJECTION SUBCUTANEOUS EVERY 5 MIN PRN
Status: CANCELLED | OUTPATIENT
Start: 2024-04-29

## 2024-04-10 RX ORDER — DIPHENHYDRAMINE HYDROCHLORIDE 50 MG/ML
50 INJECTION INTRAMUSCULAR; INTRAVENOUS AS NEEDED
Status: CANCELLED | OUTPATIENT
Start: 2024-04-15

## 2024-04-10 RX ORDER — FLUOROURACIL 50 MG/ML
300 INJECTION, SOLUTION INTRAVENOUS ONCE
Status: CANCELLED | OUTPATIENT
Start: 2024-04-15

## 2024-04-11 ENCOUNTER — HOSPITAL ENCOUNTER (OUTPATIENT)
Dept: CARDIOLOGY | Facility: CLINIC | Age: 79
Discharge: HOME | End: 2024-04-11
Payer: MEDICARE

## 2024-04-11 DIAGNOSIS — Z95.0 CARDIAC PACEMAKER IN SITU: ICD-10-CM

## 2024-04-11 DIAGNOSIS — I49.5 SICK SINUS SYNDROME (MULTI): ICD-10-CM

## 2024-04-11 PROCEDURE — 93296 REM INTERROG EVL PM/IDS: CPT

## 2024-04-11 PROCEDURE — 93295 DEV INTERROG REMOTE 1/2/MLT: CPT | Performed by: INTERNAL MEDICINE

## 2024-04-12 ENCOUNTER — INFUSION (OUTPATIENT)
Dept: HEMATOLOGY/ONCOLOGY | Facility: CLINIC | Age: 79
End: 2024-04-12
Payer: MEDICARE

## 2024-04-12 DIAGNOSIS — C18.3 MALIGNANT NEOPLASM OF HEPATIC FLEXURE (MULTI): ICD-10-CM

## 2024-04-12 DIAGNOSIS — C79.9 METASTATIC MALIGNANT NEOPLASM, UNSPECIFIED SITE (MULTI): ICD-10-CM

## 2024-04-12 LAB
ALBUMIN SERPL BCP-MCNC: 3.4 G/DL (ref 3.4–5)
ALP SERPL-CCNC: 306 U/L (ref 33–136)
ALT SERPL W P-5'-P-CCNC: 19 U/L (ref 10–52)
ANION GAP SERPL CALC-SCNC: 14 MMOL/L (ref 10–20)
APPEARANCE UR: ABNORMAL
AST SERPL W P-5'-P-CCNC: 38 U/L (ref 9–39)
BACTERIA #/AREA URNS AUTO: ABNORMAL /HPF
BASOPHILS # BLD AUTO: 0.07 X10*3/UL (ref 0–0.1)
BASOPHILS NFR BLD AUTO: 0.8 %
BILIRUB SERPL-MCNC: 1.8 MG/DL (ref 0–1.2)
BILIRUB UR STRIP.AUTO-MCNC: ABNORMAL MG/DL
BUN SERPL-MCNC: 15 MG/DL (ref 6–23)
CALCIUM SERPL-MCNC: 8.7 MG/DL (ref 8.6–10.3)
CAOX CRY #/AREA UR COMP ASSIST: ABNORMAL /HPF
CHLORIDE SERPL-SCNC: 101 MMOL/L (ref 98–107)
CO2 SERPL-SCNC: 25 MMOL/L (ref 21–32)
COLOR UR: ABNORMAL
CREAT SERPL-MCNC: 1.02 MG/DL (ref 0.5–1.3)
EGFRCR SERPLBLD CKD-EPI 2021: 75 ML/MIN/1.73M*2
EOSINOPHIL # BLD AUTO: 1.57 X10*3/UL (ref 0–0.4)
EOSINOPHIL NFR BLD AUTO: 18.2 %
ERYTHROCYTE [DISTWIDTH] IN BLOOD BY AUTOMATED COUNT: 21 % (ref 11.5–14.5)
GLUCOSE SERPL-MCNC: 90 MG/DL (ref 74–99)
GLUCOSE UR STRIP.AUTO-MCNC: NEGATIVE MG/DL
HCT VFR BLD AUTO: 37.3 % (ref 41–52)
HGB BLD-MCNC: 11.8 G/DL (ref 13.5–17.5)
HYALINE CASTS #/AREA URNS AUTO: ABNORMAL /LPF
IMM GRANULOCYTES # BLD AUTO: 0.02 X10*3/UL (ref 0–0.5)
IMM GRANULOCYTES NFR BLD AUTO: 0.2 % (ref 0–0.9)
KETONES UR STRIP.AUTO-MCNC: NEGATIVE MG/DL
LEUKOCYTE ESTERASE UR QL STRIP.AUTO: NEGATIVE
LYMPHOCYTES # BLD AUTO: 0.94 X10*3/UL (ref 0.8–3)
LYMPHOCYTES NFR BLD AUTO: 10.9 %
MCH RBC QN AUTO: 28 PG (ref 26–34)
MCHC RBC AUTO-ENTMCNC: 31.6 G/DL (ref 32–36)
MCV RBC AUTO: 89 FL (ref 80–100)
MONOCYTES # BLD AUTO: 0.56 X10*3/UL (ref 0.05–0.8)
MONOCYTES NFR BLD AUTO: 6.5 %
MUCOUS THREADS #/AREA URNS AUTO: ABNORMAL /LPF
NEUTROPHILS # BLD AUTO: 5.46 X10*3/UL (ref 1.6–5.5)
NEUTROPHILS NFR BLD AUTO: 63.4 %
NITRITE UR QL STRIP.AUTO: NEGATIVE
NRBC BLD-RTO: 0 /100 WBCS (ref 0–0)
OVALOCYTES BLD QL SMEAR: NORMAL
PH UR STRIP.AUTO: 5 [PH]
PLATELET # BLD AUTO: 274 X10*3/UL (ref 150–450)
POTASSIUM SERPL-SCNC: 3.6 MMOL/L (ref 3.5–5.3)
PROT SERPL-MCNC: 7 G/DL (ref 6.4–8.2)
PROT UR STRIP.AUTO-MCNC: ABNORMAL MG/DL
RBC # BLD AUTO: 4.21 X10*6/UL (ref 4.5–5.9)
RBC # UR STRIP.AUTO: NEGATIVE /UL
RBC #/AREA URNS AUTO: ABNORMAL /HPF
RBC MORPH BLD: NORMAL
SODIUM SERPL-SCNC: 136 MMOL/L (ref 136–145)
SP GR UR STRIP.AUTO: 1.02
SQUAMOUS #/AREA URNS AUTO: ABNORMAL /HPF
UROBILINOGEN UR STRIP.AUTO-MCNC: 4 MG/DL
WBC # BLD AUTO: 8.6 X10*3/UL (ref 4.4–11.3)
WBC #/AREA URNS AUTO: ABNORMAL /HPF

## 2024-04-12 PROCEDURE — 36591 DRAW BLOOD OFF VENOUS DEVICE: CPT

## 2024-04-12 PROCEDURE — 80053 COMPREHEN METABOLIC PANEL: CPT | Performed by: INTERNAL MEDICINE

## 2024-04-12 PROCEDURE — 85025 COMPLETE CBC W/AUTO DIFF WBC: CPT | Performed by: INTERNAL MEDICINE

## 2024-04-12 PROCEDURE — 96523 IRRIG DRUG DELIVERY DEVICE: CPT

## 2024-04-12 PROCEDURE — 81003 URINALYSIS AUTO W/O SCOPE: CPT | Performed by: INTERNAL MEDICINE

## 2024-04-12 PROCEDURE — 2500000004 HC RX 250 GENERAL PHARMACY W/ HCPCS (ALT 636 FOR OP/ED): Performed by: INTERNAL MEDICINE

## 2024-04-12 RX ORDER — HEPARIN SODIUM,PORCINE/PF 10 UNIT/ML
50 SYRINGE (ML) INTRAVENOUS AS NEEDED
Status: CANCELLED | OUTPATIENT
Start: 2024-04-12

## 2024-04-12 RX ORDER — HEPARIN 100 UNIT/ML
500 SYRINGE INTRAVENOUS AS NEEDED
Status: CANCELLED | OUTPATIENT
Start: 2024-04-12

## 2024-04-12 RX ORDER — HEPARIN 100 UNIT/ML
500 SYRINGE INTRAVENOUS AS NEEDED
Status: DISCONTINUED | OUTPATIENT
Start: 2024-04-12 | End: 2024-04-12 | Stop reason: HOSPADM

## 2024-04-12 RX ADMIN — HEPARIN 500 UNITS: 100 SYRINGE at 09:30

## 2024-04-15 ENCOUNTER — NUTRITION (OUTPATIENT)
Dept: RADIATION ONCOLOGY | Facility: CLINIC | Age: 79
End: 2024-04-15

## 2024-04-15 ENCOUNTER — SOCIAL WORK (OUTPATIENT)
Dept: HEMATOLOGY/ONCOLOGY | Facility: CLINIC | Age: 79
End: 2024-04-15
Payer: MEDICARE

## 2024-04-15 ENCOUNTER — TELEPHONE (OUTPATIENT)
Dept: CARDIOLOGY | Facility: CLINIC | Age: 79
End: 2024-04-15

## 2024-04-15 ENCOUNTER — INFUSION (OUTPATIENT)
Dept: HEMATOLOGY/ONCOLOGY | Facility: CLINIC | Age: 79
End: 2024-04-15
Payer: MEDICARE

## 2024-04-15 VITALS
DIASTOLIC BLOOD PRESSURE: 66 MMHG | BODY MASS INDEX: 26.58 KG/M2 | RESPIRATION RATE: 18 BRPM | OXYGEN SATURATION: 97 % | HEART RATE: 93 BPM | WEIGHT: 169.75 LBS | TEMPERATURE: 97.3 F | SYSTOLIC BLOOD PRESSURE: 113 MMHG

## 2024-04-15 DIAGNOSIS — C79.9 METASTATIC MALIGNANT NEOPLASM, UNSPECIFIED SITE (MULTI): Primary | ICD-10-CM

## 2024-04-15 DIAGNOSIS — C18.3 MALIGNANT NEOPLASM OF HEPATIC FLEXURE (MULTI): ICD-10-CM

## 2024-04-15 PROCEDURE — 2500000004 HC RX 250 GENERAL PHARMACY W/ HCPCS (ALT 636 FOR OP/ED): Performed by: INTERNAL MEDICINE

## 2024-04-15 PROCEDURE — 96417 CHEMO IV INFUS EACH ADDL SEQ: CPT

## 2024-04-15 PROCEDURE — 96416 CHEMO PROLONG INFUSE W/PUMP: CPT

## 2024-04-15 PROCEDURE — 96413 CHEMO IV INFUSION 1 HR: CPT

## 2024-04-15 PROCEDURE — 96375 TX/PRO/DX INJ NEW DRUG ADDON: CPT | Mod: INF

## 2024-04-15 PROCEDURE — 96415 CHEMO IV INFUSION ADDL HR: CPT

## 2024-04-15 RX ORDER — PALONOSETRON 0.05 MG/ML
0.25 INJECTION, SOLUTION INTRAVENOUS ONCE
Status: COMPLETED | OUTPATIENT
Start: 2024-04-15 | End: 2024-04-15

## 2024-04-15 RX ORDER — HEPARIN 100 UNIT/ML
500 SYRINGE INTRAVENOUS AS NEEDED
Status: CANCELLED | OUTPATIENT
Start: 2024-04-15

## 2024-04-15 RX ORDER — HEPARIN SODIUM,PORCINE/PF 10 UNIT/ML
50 SYRINGE (ML) INTRAVENOUS AS NEEDED
Status: CANCELLED | OUTPATIENT
Start: 2024-04-15

## 2024-04-15 RX ADMIN — FLUOROURACIL 3500 MG: 50 INJECTION, SOLUTION INTRAVENOUS at 14:03

## 2024-04-15 RX ADMIN — BEVACIZUMAB 400 MG: 400 INJECTION, SOLUTION INTRAVENOUS at 11:13

## 2024-04-15 RX ADMIN — PALONOSETRON 250 MCG: 0.05 INJECTION, SOLUTION INTRAVENOUS at 10:29

## 2024-04-15 RX ADMIN — DEXAMETHASONE SODIUM PHOSPHATE 12 MG: 10 INJECTION, SOLUTION INTRAMUSCULAR; INTRAVENOUS at 10:29

## 2024-04-15 RX ADMIN — OXALIPLATIN 125 MG: 5 INJECTION, SOLUTION INTRAVENOUS at 11:39

## 2024-04-15 ASSESSMENT — PAIN SCALES - GENERAL: PAINLEVEL: 0-NO PAIN

## 2024-04-15 NOTE — SIGNIFICANT EVENT
04/15/24 1007   Prechemo Checklist   Has the patient been in the hospital, ED, or urgent care since last date of service No   Chemo/Immuno Consent Signed Yes   Protocol/Indications Verified Yes   Confirmed to previous date/time of medication Yes   Compared to previous dose Yes   All medications are dated accurately Yes   Pregnancy Test Negative Not applicable   Parameters Met Yes   BSA/Weight-Height Verified Yes   Dose Calculations Verified (current, total, cumulative) Yes

## 2024-04-15 NOTE — PROGRESS NOTES
ONCOLOGY/HEMATOLOGY PSYCHOSOCIAL ASSESSMENT     Demographic Information  Oleg Yang  1945  [id number]  Assessment Type:    Date of assessment: 04/15/24  Person(s) present during assessment: patient, wife and dtr  Did patient participate in assessment: yes  If no, why not:  Primary language: english  Interpretive services used: no  Medical Oncologist: Elijah  Diagnosis:hepatic flexure colon carcinoma with mets to liver and lung                Marital Status / Family / Household  Status:    Family composition:  for 53 yrs and has a son in [facility]. A dtr in [place] and a dtr in [place]  Support systems: family  Primary caregiver:  self  Current employment status:   retired  15 yrs ago  Work history/type of work:    for post [employer]  Comments:     Environmental Supports  Current living situation:   house  Patient's home environment: ranch style home everything on one level     Functional Status   Functional status:  Independent   Other health issues that the patient is experiencing: none reported at visit  What supports are in place to assist the patient: has one level home, shower seat and bars to help pt safely bathe.  What community resources have been offered: TGP and wellbeing retreat programs  Transportation:  Pt and family to drive  Psychosocial risk factors impacting the patient:   Adjustment to dx and treatment plan     Assistive Devices  Patient has the following equipment: bath seat  Patient currently ambulates:  with no devices     Finances/Insurance  Primary insurance: Medicare A & B  Secondary insurance: [employer] supplement  Patient's current income source:  long-term  Does the patient have any financial concerns:  none mentioned during visit    Comments:      Advance Directives  None on file    Mental Health  Mental health history and status details:  none reported or on file  How does the patient describe their current health status: Pt is AxOx3 and  understands his dx and treatment plan.   What does the patient do for enjoyment: naps when needed, works on projects with his tools in the basement and spends time with family    Depression Screen        Social Determinants of Health     Tobacco Use: Low Risk  (4/8/2024)    Patient History     Smoking Tobacco Use: Never     Smokeless Tobacco Use: Never     Passive Exposure: Not on file   Alcohol Use: Not At Risk (3/4/2024)    AUDIT-C     Frequency of Alcohol Consumption: Never     Average Number of Drinks: Patient does not drink     Frequency of Binge Drinking: Never   Financial Resource Strain: Low Risk  (3/4/2024)    Overall Financial Resource Strain (CARDIA)     Difficulty of Paying Living Expenses: Not hard at all   Food Insecurity: Not on file   Transportation Needs: No Transportation Needs (3/4/2024)    PRAPARE - Transportation     Lack of Transportation (Medical): No     Lack of Transportation (Non-Medical): No   Physical Activity: Not on file   Stress: Not on file   Social Connections: Not on file   Intimate Partner Violence: Not on file   Depression: At risk (3/15/2024)    PHQ-2     PHQ-2 Score: 3   Housing Stability: Low Risk  (3/4/2024)    Housing Stability Vital Sign     Unable to Pay for Housing in the Last Year: No     Number of Places Lived in the Last Year: 1     Unstable Housing in the Last Year: No   Utilities: Not on file   Digital Equity: Not on file   Health Literacy: Not on file       Assessment/Plan    Pt is a 77 y/o male dx hepatic flexure colon cancer with mets to liver and lung. Pt starting avastin and folfox today. Pt here with his wife of 53 years and his youngest dtr who lives in [place]. Pt has another dtr in [place] and a son who resides in [facility]. Pt is a retired  from the post office. Pt spends time working on small projects in the basement where his tools are located. Pt lives in ranch style home and everything is on one level. Pt has a bath seat and  grab bars in shower area to help him if he is over tired. Pt does not have a very good appetite and was seen by the dietitian today. Pt is napping and sleeping when he needs it. At times pt is up at 3am for the day due to not needing any more sleep because of his napping. Pt seems to be well supported in place at home. Introduced Onc LISW-S supportive role and reviewed TGP and wellbeing retreat programs with pt. Let pt know that Onc LISW-S would be checking in on him regularly to ensure he is managing and coping well throughout care and tx. Left card for pt and family to call with any needs.

## 2024-04-16 VITALS — HEIGHT: 67 IN | BODY MASS INDEX: 26.64 KG/M2 | WEIGHT: 169.75 LBS

## 2024-04-16 NOTE — PROGRESS NOTES
"NUTRITION Assessment NOTE    Nutrition Assessment     Reason for Visit:  Oleg Yang is a 78 y.o. male who presents for metastatic adenocarcinoma of the colon. Treatment of FOLFOX and avastin at 25% dose reduction.     RD was able to meet with patient at  first treatment to introduce nutrition services. Patient is doing very well overall. Discussed nutrition for cancer care (liberal, high protein diet). No acute nutrition concerns at this time. Provided RD information for patient to reach out with any needs.     Lab Results   Component Value Date/Time    GLUCOSE 90 04/12/2024 0910     04/12/2024 0910    K 3.6 04/12/2024 0910     04/12/2024 0910    CO2 25 04/12/2024 0910    ANIONGAP 14 04/12/2024 0910    BUN 15 04/12/2024 0910    CREATININE 1.02 04/12/2024 0910    EGFR 75 04/12/2024 0910    CALCIUM 8.7 04/12/2024 0910    ALBUMIN 3.4 04/12/2024 0910    ALKPHOS 306 (H) 04/12/2024 0910    PROT 7.0 04/12/2024 0910    AST 38 04/12/2024 0910    BILITOT 1.8 (H) 04/12/2024 0910    ALT 19 04/12/2024 0910     Anthropometrics:  Anthropometrics  Height: 170.2 cm (5' 7.01\")  Weight: 77 kg (169 lb 12.1 oz)  BMI (Calculated): 26.58  Weight Change  Weight History / % Weight Change: Patient reports recent weight loss, no sure of the amount    Wt Readings from Last 10 Encounters:   04/16/24 77 kg (169 lb 12.1 oz)   04/15/24 77 kg (169 lb 12.1 oz)   04/02/24 80 kg (176 lb 5.9 oz)   03/21/24 83 kg (183 lb)   03/15/24 82.4 kg (181 lb 10.5 oz)   03/06/24 82.1 kg (181 lb)   01/08/24 91.8 kg (202 lb 6.4 oz)   12/04/23 89.8 kg (198 lb)   10/24/23 85.3 kg (188 lb)   08/25/23 78.9 kg (174 lb)     Time Spent  Prep time on day of patient encounter: 15 minutes  Time spent directly with patient, family or caregiver: 15 minutes  Documentation Time: 15 minutes      "

## 2024-04-17 ENCOUNTER — INFUSION (OUTPATIENT)
Dept: HEMATOLOGY/ONCOLOGY | Facility: CLINIC | Age: 79
End: 2024-04-17
Payer: MEDICARE

## 2024-04-17 ENCOUNTER — APPOINTMENT (OUTPATIENT)
Dept: CARDIOLOGY | Facility: CLINIC | Age: 79
End: 2024-04-17
Payer: MEDICARE

## 2024-04-17 VITALS
OXYGEN SATURATION: 98 % | DIASTOLIC BLOOD PRESSURE: 67 MMHG | HEART RATE: 94 BPM | TEMPERATURE: 97.7 F | RESPIRATION RATE: 18 BRPM | SYSTOLIC BLOOD PRESSURE: 119 MMHG

## 2024-04-17 DIAGNOSIS — C18.3 MALIGNANT NEOPLASM OF HEPATIC FLEXURE (MULTI): ICD-10-CM

## 2024-04-17 DIAGNOSIS — Z79.899 ON AMIODARONE THERAPY: ICD-10-CM

## 2024-04-17 DIAGNOSIS — C79.9 METASTATIC MALIGNANT NEOPLASM, UNSPECIFIED SITE (MULTI): ICD-10-CM

## 2024-04-17 DIAGNOSIS — C18.3 MALIGNANT NEOPLASM OF HEPATIC FLEXURE (MULTI): Primary | ICD-10-CM

## 2024-04-17 DIAGNOSIS — C79.9 METASTATIC MALIGNANT NEOPLASM, UNSPECIFIED SITE (MULTI): Primary | ICD-10-CM

## 2024-04-17 LAB — FOCUSED SOLID TUMOR DNA/RNA RESULTS: NORMAL

## 2024-04-17 PROCEDURE — 96523 IRRIG DRUG DELIVERY DEVICE: CPT

## 2024-04-17 PROCEDURE — 96372 THER/PROPH/DIAG INJ SC/IM: CPT

## 2024-04-17 PROCEDURE — 2500000004 HC RX 250 GENERAL PHARMACY W/ HCPCS (ALT 636 FOR OP/ED): Performed by: INTERNAL MEDICINE

## 2024-04-17 RX ORDER — LORAZEPAM 2 MG/ML
1 INJECTION INTRAMUSCULAR AS NEEDED
Status: CANCELLED | OUTPATIENT
Start: 2024-05-13

## 2024-04-17 RX ORDER — PALONOSETRON 0.05 MG/ML
0.25 INJECTION, SOLUTION INTRAVENOUS ONCE
Status: CANCELLED | OUTPATIENT
Start: 2024-05-13

## 2024-04-17 RX ORDER — ONDANSETRON HYDROCHLORIDE 8 MG/1
8 TABLET, FILM COATED ORAL EVERY 8 HOURS PRN
Qty: 30 TABLET | Refills: 2 | Status: SHIPPED | OUTPATIENT
Start: 2024-04-17 | End: 2024-05-19 | Stop reason: HOSPADM

## 2024-04-17 RX ORDER — PROCHLORPERAZINE MALEATE 10 MG
10 TABLET ORAL EVERY 6 HOURS PRN
Qty: 30 TABLET | Refills: 2 | Status: SHIPPED | OUTPATIENT
Start: 2024-04-17 | End: 2024-05-19 | Stop reason: HOSPADM

## 2024-04-17 RX ORDER — EPINEPHRINE 0.3 MG/.3ML
0.3 INJECTION SUBCUTANEOUS EVERY 5 MIN PRN
Status: CANCELLED | OUTPATIENT
Start: 2024-05-13

## 2024-04-17 RX ORDER — FAMOTIDINE 10 MG/ML
20 INJECTION INTRAVENOUS ONCE AS NEEDED
Status: CANCELLED | OUTPATIENT
Start: 2024-05-13

## 2024-04-17 RX ORDER — ALBUTEROL SULFATE 0.83 MG/ML
3 SOLUTION RESPIRATORY (INHALATION) AS NEEDED
Status: CANCELLED | OUTPATIENT
Start: 2024-05-13

## 2024-04-17 RX ORDER — HEPARIN 100 UNIT/ML
500 SYRINGE INTRAVENOUS AS NEEDED
Status: DISCONTINUED | OUTPATIENT
Start: 2024-04-17 | End: 2024-04-17 | Stop reason: HOSPADM

## 2024-04-17 RX ORDER — DIPHENHYDRAMINE HYDROCHLORIDE 50 MG/ML
50 INJECTION INTRAMUSCULAR; INTRAVENOUS AS NEEDED
Status: CANCELLED | OUTPATIENT
Start: 2024-05-13

## 2024-04-17 RX ORDER — PROCHLORPERAZINE MALEATE 5 MG
10 TABLET ORAL EVERY 6 HOURS PRN
Status: CANCELLED | OUTPATIENT
Start: 2024-05-13

## 2024-04-17 RX ORDER — FLUOROURACIL 50 MG/ML
300 INJECTION, SOLUTION INTRAVENOUS ONCE
Status: CANCELLED | OUTPATIENT
Start: 2024-05-13

## 2024-04-17 RX ORDER — HEPARIN 100 UNIT/ML
500 SYRINGE INTRAVENOUS AS NEEDED
Status: CANCELLED | OUTPATIENT
Start: 2024-04-17

## 2024-04-17 RX ORDER — PROCHLORPERAZINE EDISYLATE 5 MG/ML
10 INJECTION INTRAMUSCULAR; INTRAVENOUS EVERY 6 HOURS PRN
Status: CANCELLED | OUTPATIENT
Start: 2024-05-13

## 2024-04-17 RX ORDER — HEPARIN SODIUM,PORCINE/PF 10 UNIT/ML
50 SYRINGE (ML) INTRAVENOUS AS NEEDED
Status: CANCELLED | OUTPATIENT
Start: 2024-04-17

## 2024-04-17 RX ADMIN — HEPARIN 500 UNITS: 100 SYRINGE at 13:15

## 2024-04-17 RX ADMIN — PEGFILGRASTIM 6 MG: 6 INJECTION SUBCUTANEOUS at 13:14

## 2024-04-17 ASSESSMENT — PAIN SCALES - GENERAL: PAINLEVEL: 0-NO PAIN

## 2024-04-24 ENCOUNTER — PATIENT OUTREACH (OUTPATIENT)
Dept: PRIMARY CARE | Facility: CLINIC | Age: 79
End: 2024-04-24
Payer: MEDICARE

## 2024-04-24 NOTE — PROGRESS NOTES
Call placed regarding one month post discharge follow up call. At time of outreach call the patient feels as if their condition has improved.  Pt denies questions or concerns at this time. Pt aware of my availability for non-emergent concerns. Contact info provided to patient

## 2024-04-26 ENCOUNTER — INFUSION (OUTPATIENT)
Dept: HEMATOLOGY/ONCOLOGY | Facility: CLINIC | Age: 79
End: 2024-04-26
Payer: MEDICARE

## 2024-04-26 VITALS
OXYGEN SATURATION: 98 % | RESPIRATION RATE: 18 BRPM | DIASTOLIC BLOOD PRESSURE: 67 MMHG | TEMPERATURE: 97 F | WEIGHT: 176.37 LBS | SYSTOLIC BLOOD PRESSURE: 119 MMHG | BODY MASS INDEX: 27.68 KG/M2 | HEART RATE: 94 BPM | HEIGHT: 67 IN

## 2024-04-26 VITALS — WEIGHT: 162.92 LBS | BODY MASS INDEX: 25.51 KG/M2

## 2024-04-26 DIAGNOSIS — C79.9 METASTATIC MALIGNANT NEOPLASM, UNSPECIFIED SITE (MULTI): ICD-10-CM

## 2024-04-26 DIAGNOSIS — C18.3 MALIGNANT NEOPLASM OF HEPATIC FLEXURE (MULTI): ICD-10-CM

## 2024-04-26 LAB
ALBUMIN SERPL BCP-MCNC: 3.5 G/DL (ref 3.4–5)
ALP SERPL-CCNC: 360 U/L (ref 33–136)
ALT SERPL W P-5'-P-CCNC: 27 U/L (ref 10–52)
ANION GAP SERPL CALC-SCNC: 15 MMOL/L (ref 10–20)
APPEARANCE UR: ABNORMAL
AST SERPL W P-5'-P-CCNC: 51 U/L (ref 9–39)
BACTERIA #/AREA URNS AUTO: ABNORMAL /HPF
BASOPHILS # BLD AUTO: 0.08 X10*3/UL (ref 0–0.1)
BASOPHILS NFR BLD AUTO: 0.5 %
BILIRUB SERPL-MCNC: 1.2 MG/DL (ref 0–1.2)
BILIRUB UR STRIP.AUTO-MCNC: NEGATIVE MG/DL
BUN SERPL-MCNC: 16 MG/DL (ref 6–23)
CALCIUM SERPL-MCNC: 8.7 MG/DL (ref 8.6–10.3)
CHLORIDE SERPL-SCNC: 100 MMOL/L (ref 98–107)
CO2 SERPL-SCNC: 28 MMOL/L (ref 21–32)
COLOR UR: ABNORMAL
CREAT SERPL-MCNC: 1.3 MG/DL (ref 0.5–1.3)
EGFRCR SERPLBLD CKD-EPI 2021: 56 ML/MIN/1.73M*2
EOSINOPHIL # BLD AUTO: 1.22 X10*3/UL (ref 0–0.4)
EOSINOPHIL NFR BLD AUTO: 7.4 %
ERYTHROCYTE [DISTWIDTH] IN BLOOD BY AUTOMATED COUNT: 19.8 % (ref 11.5–14.5)
GLUCOSE SERPL-MCNC: 133 MG/DL (ref 74–99)
GLUCOSE UR STRIP.AUTO-MCNC: ABNORMAL MG/DL
HCT VFR BLD AUTO: 41.9 % (ref 41–52)
HGB BLD-MCNC: 13.2 G/DL (ref 13.5–17.5)
HYALINE CASTS #/AREA URNS AUTO: ABNORMAL /LPF
IMM GRANULOCYTES # BLD AUTO: 0.1 X10*3/UL (ref 0–0.5)
IMM GRANULOCYTES NFR BLD AUTO: 0.6 % (ref 0–0.9)
KETONES UR STRIP.AUTO-MCNC: NEGATIVE MG/DL
LEUKOCYTE ESTERASE UR QL STRIP.AUTO: NEGATIVE
LYMPHOCYTES # BLD AUTO: 2.05 X10*3/UL (ref 0.8–3)
LYMPHOCYTES NFR BLD AUTO: 12.4 %
MCH RBC QN AUTO: 28 PG (ref 26–34)
MCHC RBC AUTO-ENTMCNC: 31.5 G/DL (ref 32–36)
MCV RBC AUTO: 89 FL (ref 80–100)
MONOCYTES # BLD AUTO: 0.93 X10*3/UL (ref 0.05–0.8)
MONOCYTES NFR BLD AUTO: 5.6 %
MUCOUS THREADS #/AREA URNS AUTO: ABNORMAL /LPF
NEUTROPHILS # BLD AUTO: 12.17 X10*3/UL (ref 1.6–5.5)
NEUTROPHILS NFR BLD AUTO: 73.5 %
NITRITE UR QL STRIP.AUTO: NEGATIVE
NRBC BLD-RTO: 0 /100 WBCS (ref 0–0)
PH UR STRIP.AUTO: 5 [PH]
PLATELET # BLD AUTO: 263 X10*3/UL (ref 150–450)
POTASSIUM SERPL-SCNC: 3.1 MMOL/L (ref 3.5–5.3)
PROT SERPL-MCNC: 7.3 G/DL (ref 6.4–8.2)
PROT UR STRIP.AUTO-MCNC: ABNORMAL MG/DL
RBC # BLD AUTO: 4.71 X10*6/UL (ref 4.5–5.9)
RBC # UR STRIP.AUTO: NEGATIVE /UL
RBC #/AREA URNS AUTO: ABNORMAL /HPF
SODIUM SERPL-SCNC: 140 MMOL/L (ref 136–145)
SP GR UR STRIP.AUTO: 1.02
SQUAMOUS #/AREA URNS AUTO: ABNORMAL /HPF
UROBILINOGEN UR STRIP.AUTO-MCNC: 4 MG/DL
WBC # BLD AUTO: 16.6 X10*3/UL (ref 4.4–11.3)
WBC #/AREA URNS AUTO: ABNORMAL /HPF

## 2024-04-26 PROCEDURE — 96523 IRRIG DRUG DELIVERY DEVICE: CPT

## 2024-04-26 PROCEDURE — 80053 COMPREHEN METABOLIC PANEL: CPT | Performed by: INTERNAL MEDICINE

## 2024-04-26 PROCEDURE — 81001 URINALYSIS AUTO W/SCOPE: CPT | Performed by: INTERNAL MEDICINE

## 2024-04-26 PROCEDURE — 36591 DRAW BLOOD OFF VENOUS DEVICE: CPT

## 2024-04-26 PROCEDURE — 85025 COMPLETE CBC W/AUTO DIFF WBC: CPT | Performed by: INTERNAL MEDICINE

## 2024-04-26 PROCEDURE — 2500000004 HC RX 250 GENERAL PHARMACY W/ HCPCS (ALT 636 FOR OP/ED): Performed by: INTERNAL MEDICINE

## 2024-04-26 RX ORDER — HEPARIN SODIUM,PORCINE/PF 10 UNIT/ML
50 SYRINGE (ML) INTRAVENOUS AS NEEDED
Status: CANCELLED | OUTPATIENT
Start: 2024-04-26

## 2024-04-26 RX ORDER — HEPARIN 100 UNIT/ML
500 SYRINGE INTRAVENOUS AS NEEDED
Status: CANCELLED | OUTPATIENT
Start: 2024-04-26

## 2024-04-26 RX ORDER — HEPARIN 100 UNIT/ML
500 SYRINGE INTRAVENOUS AS NEEDED
Status: DISCONTINUED | OUTPATIENT
Start: 2024-04-26 | End: 2024-04-26 | Stop reason: HOSPADM

## 2024-04-26 RX ADMIN — HEPARIN 500 UNITS: 100 SYRINGE at 09:00

## 2024-04-29 ENCOUNTER — OFFICE VISIT (OUTPATIENT)
Dept: HEMATOLOGY/ONCOLOGY | Facility: CLINIC | Age: 79
End: 2024-04-29
Payer: MEDICARE

## 2024-04-29 ENCOUNTER — INFUSION (OUTPATIENT)
Dept: HEMATOLOGY/ONCOLOGY | Facility: CLINIC | Age: 79
End: 2024-04-29
Payer: MEDICARE

## 2024-04-29 VITALS
BODY MASS INDEX: 25.58 KG/M2 | TEMPERATURE: 97.5 F | SYSTOLIC BLOOD PRESSURE: 118 MMHG | OXYGEN SATURATION: 98 % | WEIGHT: 163.36 LBS | HEART RATE: 88 BPM | DIASTOLIC BLOOD PRESSURE: 64 MMHG | RESPIRATION RATE: 16 BRPM

## 2024-04-29 VITALS
TEMPERATURE: 98.6 F | SYSTOLIC BLOOD PRESSURE: 103 MMHG | OXYGEN SATURATION: 97 % | HEART RATE: 78 BPM | RESPIRATION RATE: 18 BRPM | DIASTOLIC BLOOD PRESSURE: 62 MMHG

## 2024-04-29 DIAGNOSIS — C79.9 METASTATIC MALIGNANT NEOPLASM, UNSPECIFIED SITE (MULTI): ICD-10-CM

## 2024-04-29 DIAGNOSIS — C18.3 MALIGNANT NEOPLASM OF HEPATIC FLEXURE (MULTI): ICD-10-CM

## 2024-04-29 PROCEDURE — 96375 TX/PRO/DX INJ NEW DRUG ADDON: CPT | Mod: INF

## 2024-04-29 PROCEDURE — 3078F DIAST BP <80 MM HG: CPT | Performed by: INTERNAL MEDICINE

## 2024-04-29 PROCEDURE — 1160F RVW MEDS BY RX/DR IN RCRD: CPT | Performed by: INTERNAL MEDICINE

## 2024-04-29 PROCEDURE — 96411 CHEMO IV PUSH ADDL DRUG: CPT

## 2024-04-29 PROCEDURE — 1126F AMNT PAIN NOTED NONE PRSNT: CPT | Performed by: INTERNAL MEDICINE

## 2024-04-29 PROCEDURE — 96415 CHEMO IV INFUSION ADDL HR: CPT

## 2024-04-29 PROCEDURE — 3074F SYST BP LT 130 MM HG: CPT | Performed by: INTERNAL MEDICINE

## 2024-04-29 PROCEDURE — 2500000004 HC RX 250 GENERAL PHARMACY W/ HCPCS (ALT 636 FOR OP/ED): Performed by: INTERNAL MEDICINE

## 2024-04-29 PROCEDURE — 99215 OFFICE O/P EST HI 40 MIN: CPT | Performed by: INTERNAL MEDICINE

## 2024-04-29 PROCEDURE — 1036F TOBACCO NON-USER: CPT | Performed by: INTERNAL MEDICINE

## 2024-04-29 PROCEDURE — 96413 CHEMO IV INFUSION 1 HR: CPT

## 2024-04-29 PROCEDURE — 1159F MED LIST DOCD IN RCRD: CPT | Performed by: INTERNAL MEDICINE

## 2024-04-29 RX ORDER — HEPARIN SODIUM,PORCINE/PF 10 UNIT/ML
50 SYRINGE (ML) INTRAVENOUS AS NEEDED
Status: DISCONTINUED | OUTPATIENT
Start: 2024-04-29 | End: 2024-04-29 | Stop reason: HOSPADM

## 2024-04-29 RX ORDER — ALBUTEROL SULFATE 0.83 MG/ML
3 SOLUTION RESPIRATORY (INHALATION) AS NEEDED
Status: DISCONTINUED | OUTPATIENT
Start: 2024-04-29 | End: 2024-04-29 | Stop reason: HOSPADM

## 2024-04-29 RX ORDER — HEPARIN 100 UNIT/ML
500 SYRINGE INTRAVENOUS AS NEEDED
Status: CANCELLED | OUTPATIENT
Start: 2024-04-29

## 2024-04-29 RX ORDER — PALONOSETRON 0.05 MG/ML
0.25 INJECTION, SOLUTION INTRAVENOUS ONCE
Status: COMPLETED | OUTPATIENT
Start: 2024-04-29 | End: 2024-04-29

## 2024-04-29 RX ORDER — HEPARIN SODIUM,PORCINE/PF 10 UNIT/ML
50 SYRINGE (ML) INTRAVENOUS AS NEEDED
Status: CANCELLED | OUTPATIENT
Start: 2024-04-29

## 2024-04-29 RX ORDER — POTASSIUM CHLORIDE 750 MG/1
10 CAPSULE, EXTENDED RELEASE ORAL 2 TIMES DAILY
Qty: 60 CAPSULE | Refills: 11 | Status: SHIPPED | OUTPATIENT
Start: 2024-04-29 | End: 2024-05-02

## 2024-04-29 RX ORDER — FAMOTIDINE 10 MG/ML
20 INJECTION INTRAVENOUS ONCE AS NEEDED
Status: DISCONTINUED | OUTPATIENT
Start: 2024-04-29 | End: 2024-04-29 | Stop reason: HOSPADM

## 2024-04-29 RX ORDER — HEPARIN 100 UNIT/ML
500 SYRINGE INTRAVENOUS AS NEEDED
Status: DISCONTINUED | OUTPATIENT
Start: 2024-04-29 | End: 2024-04-29 | Stop reason: HOSPADM

## 2024-04-29 RX ORDER — DIPHENHYDRAMINE HYDROCHLORIDE 50 MG/ML
50 INJECTION INTRAMUSCULAR; INTRAVENOUS AS NEEDED
Status: DISCONTINUED | OUTPATIENT
Start: 2024-04-29 | End: 2024-04-29 | Stop reason: HOSPADM

## 2024-04-29 RX ORDER — EPINEPHRINE 0.3 MG/.3ML
0.3 INJECTION SUBCUTANEOUS EVERY 5 MIN PRN
Status: DISCONTINUED | OUTPATIENT
Start: 2024-04-29 | End: 2024-04-29 | Stop reason: HOSPADM

## 2024-04-29 RX ADMIN — DEXAMETHASONE SODIUM PHOSPHATE 12 MG: 10 INJECTION, SOLUTION INTRAMUSCULAR; INTRAVENOUS at 10:27

## 2024-04-29 RX ADMIN — BEVACIZUMAB 400 MG: 400 INJECTION, SOLUTION INTRAVENOUS at 10:54

## 2024-04-29 RX ADMIN — FLUOROURACIL 3500 MG: 50 INJECTION, SOLUTION INTRAVENOUS at 13:49

## 2024-04-29 RX ADMIN — PALONOSETRON 250 MCG: 0.05 INJECTION, SOLUTION INTRAVENOUS at 10:27

## 2024-04-29 RX ADMIN — OXALIPLATIN 125 MG: 5 INJECTION, SOLUTION INTRAVENOUS at 11:23

## 2024-04-29 ASSESSMENT — PAIN SCALES - GENERAL
PAINLEVEL_OUTOF10: 0-NO PAIN
PAINLEVEL: 0-NO PAIN

## 2024-04-29 NOTE — SIGNIFICANT EVENT

## 2024-04-29 NOTE — PROGRESS NOTES
A colonoscopy on March 6, 2024 revealed a large ulcerating mass and hepatic flexure.  Biopsy consistent with adenocarcinoma.  Liver, right lobe liver biopsy on March 7, 2024 revealed moderately differentiated adenocarcinoma involving the liver compatible with colorectal origin.  Protein:  Result     MLH-1:  Expression Present                                    PMS-2: Expression Present                                    MSH-2: Expression Present                                    MSH-6: Expression Present     .  .7 on March 3, 2024.  CA 19-9 23,616 also on March 3, 2024  Oleg Yang is a 78 y.o. male with past medical history of ASHD, HTN, HFrEF (with ICD; EF 20-25%), Afib (on eliquis) who presented to [facility] ED on 3/3/24 with chest and abdominal pain. On 3/02/24 morning, patient mentions waking up around 1am to a sensation of being shocked. His legs jolted up and then he had some chest pain. He also endorses severe abdominal pain which he describes as being 10/10 and diffuse.     In the ED, vitals were stable. Work-up significant for , trop 62. CTAP showed widespread metastatic disease to lungs, liver, and retroperitoneal and retrocrural adenopathy, suspicious for metastatic colon cancer. Heme/Onc was consulted for evaluation of suspected metastatic colon cancer.     On evaluation today, vitals significant for SBP in the 80s. Interval labs notable for Cr 1.42 (from 1.32; baseline around ~1.2), Hgb 8.0 (baseline ~13), RDW elevated to 16.3. Tumor markers significant for CA 19-9 elevated to 23,616, .5, and AFP <4. CTAP read by Radiologist as showing findings compatible with widespread metastatic disease including in the lungs  with numerous hepatic metastatic disease also retroperitoneal and retrocrural adenopathy is seen. Left upper quadrant soft tissue mass seen between the stomach in the liver. Findings favor metastatic colon carcinoma given the appearance. GI has been consulted as well, and  "advised IR biopsy of hepatic lesion. Patient mentions that he has no family history of cancer. He mentions that he has never smoked as well. He endorses some weight loss of around 10 pounds over the last year; however, he mentions that his weight fluctuates a lot due to his heart failure. He mentions that his symptoms began around Friday (03/02/24) with generalized vague abdominal pain and nausea. He has not been constipated and mentions that his most recent bowel movement had no signs of blood and was brown.    The patient and family had come for follow-up on April 29, 2024 regarding history of metastatic adenocarcinoma of colon with liver and lung mets.  The patient complains of easy fatigability requesting assistance.  Since last evaluation the patient did receive intravenous iron and is beginning to feel better.Tolerated cycle 1 FOLFOX with Avastin.     A 10 point ROS was completed and is negative expect as stated in HPI.      Past Medical History: As stated above     Past Surgical History: As stated above; knee surgery (06/2021), total hip replacement (06/2021)     Social History: No tobacco use, social drinker (1 drink/week), no illicit drug use,      Family History: Mother and Father - Heart Disease;      Objective:     BP 85/52 (Patient Position: Sitting)   Pulse 82   Temp 36.7 °C (98.1 °F)   Resp 18   Ht 1.702 m (5' 7\")   Wt 82.1 kg (181 lb)   SpO2 96%   BMI 28.35 kg/m²      Physical Exam:  GEN: conversant, NAD  HEENT: PERRL, EOMI, MMM OP clear, TMs clear bilaterally  NECK: supple, no cervical LAD, no carotid bruits  CV: S1, S2, regular, no murmur  PULM: CTAB  ABD: soft, NT, ND, BS+, mild tenderness to palpation in lower quadrants.  EXT: no LE edema  NEURO: no gross focal deficits  PSYCH: appropriate affect      Assessment/Plan:  Oleg Yang is a 78 y.o. male with past medical history of ASHD, HTN, HFrEF (with ICD; EF 20-25%), Afib (on eliquis) who presented to [facility] ED on 3/3/24 with chest and " abdominal pain. Heme/Onc was consulted for evaluation of suspected metastatic colon cancer.    Metastatic adenocarcinoma of colon, .7 on March 3, 2024.    Recommend PET scan, molecular studies on the biopsy specimen, consider port placement if agreeable for chemotherapy.    Severe iron deficiency anemia:    Recommend Feraheme 510 mg weekly x 2 weeks.  Effect side effects explained.  Return in 3 weeks.  The patient and family had come for follow-up on April 2, 2024 regarding history of widespread metastatic adenocarcinoma of colon with liver and lung mets.  The patient did have iron deficiency component for which she received intravenous Feraheme 510 mg/week x 2 weeks and is beginning to feel better.  A PET scan revealed ascending colon mass liver mets and lung mets.  I had a detailed discussion with the patient and family explained to them that options include do nothing, clinical trials, consider palliative chemotherapy with FOLFOX with Avastin.  I have recommended molecular studies as well.  After thinking through all the options the patient and family are agreeable to initiate chemotherapy with FOLFOX with Avastin at 25% dose reduction.  [id number] being placed on April 8, 2024.  Chemotherapy to begin after port placement and approval.  Return in 2 weeks.  The patient and family had come for follow-up on April 29, 2024 regarding history of metastatic adenocarcinoma of colon with liver and lung mets.  The patient complains of easy fatigability requesting assistance.  Since last evaluation the patient did receive intravenous iron and is beginning to feel better.Tolerated cycle 1 FOLFOX with Avastin.Cycle 2 today

## 2024-05-01 ENCOUNTER — INFUSION (OUTPATIENT)
Dept: HEMATOLOGY/ONCOLOGY | Facility: CLINIC | Age: 79
End: 2024-05-01
Payer: MEDICARE

## 2024-05-01 VITALS
RESPIRATION RATE: 18 BRPM | SYSTOLIC BLOOD PRESSURE: 159 MMHG | TEMPERATURE: 98.6 F | HEART RATE: 98 BPM | OXYGEN SATURATION: 97 % | DIASTOLIC BLOOD PRESSURE: 82 MMHG

## 2024-05-01 DIAGNOSIS — C79.9 METASTATIC MALIGNANT NEOPLASM, UNSPECIFIED SITE (MULTI): ICD-10-CM

## 2024-05-01 DIAGNOSIS — C18.3 MALIGNANT NEOPLASM OF HEPATIC FLEXURE (MULTI): ICD-10-CM

## 2024-05-01 PROCEDURE — 2500000004 HC RX 250 GENERAL PHARMACY W/ HCPCS (ALT 636 FOR OP/ED): Performed by: INTERNAL MEDICINE

## 2024-05-01 PROCEDURE — 96523 IRRIG DRUG DELIVERY DEVICE: CPT

## 2024-05-01 PROCEDURE — 96372 THER/PROPH/DIAG INJ SC/IM: CPT

## 2024-05-01 RX ORDER — HEPARIN 100 UNIT/ML
500 SYRINGE INTRAVENOUS AS NEEDED
Status: CANCELLED | OUTPATIENT
Start: 2024-05-01

## 2024-05-01 RX ORDER — HEPARIN SODIUM,PORCINE/PF 10 UNIT/ML
50 SYRINGE (ML) INTRAVENOUS AS NEEDED
Status: CANCELLED | OUTPATIENT
Start: 2024-05-01

## 2024-05-01 RX ORDER — HEPARIN 100 UNIT/ML
500 SYRINGE INTRAVENOUS AS NEEDED
Status: DISCONTINUED | OUTPATIENT
Start: 2024-05-01 | End: 2024-05-01 | Stop reason: HOSPADM

## 2024-05-01 RX ADMIN — HEPARIN 500 UNITS: 100 SYRINGE at 13:23

## 2024-05-01 RX ADMIN — PEGFILGRASTIM 6 MG: 6 INJECTION SUBCUTANEOUS at 13:13

## 2024-05-01 ASSESSMENT — PAIN SCALES - GENERAL: PAINLEVEL: 0-NO PAIN

## 2024-05-02 DIAGNOSIS — C79.9 METASTATIC MALIGNANT NEOPLASM, UNSPECIFIED SITE (MULTI): Primary | ICD-10-CM

## 2024-05-02 DIAGNOSIS — C18.3 MALIGNANT NEOPLASM OF HEPATIC FLEXURE (MULTI): Primary | ICD-10-CM

## 2024-05-02 RX ORDER — POTASSIUM CHLORIDE 1.5 G/1.58G
20 POWDER, FOR SOLUTION ORAL DAILY
Qty: 30 PACKET | Refills: 11 | Status: SHIPPED | OUTPATIENT
Start: 2024-05-02 | End: 2024-05-19 | Stop reason: HOSPADM

## 2024-05-10 ENCOUNTER — INFUSION (OUTPATIENT)
Dept: HEMATOLOGY/ONCOLOGY | Facility: CLINIC | Age: 79
End: 2024-05-10
Payer: MEDICARE

## 2024-05-10 DIAGNOSIS — D69.6 THROMBOCYTOPENIA (CMS-HCC): ICD-10-CM

## 2024-05-10 DIAGNOSIS — C79.9 METASTATIC MALIGNANT NEOPLASM, UNSPECIFIED SITE (MULTI): ICD-10-CM

## 2024-05-10 DIAGNOSIS — C18.3 MALIGNANT NEOPLASM OF HEPATIC FLEXURE (MULTI): ICD-10-CM

## 2024-05-10 LAB
ALBUMIN SERPL BCP-MCNC: 3.4 G/DL (ref 3.4–5)
ALP SERPL-CCNC: 376 U/L (ref 33–136)
ALT SERPL W P-5'-P-CCNC: 38 U/L (ref 10–52)
ANION GAP SERPL CALC-SCNC: 12 MMOL/L (ref 10–20)
APPEARANCE UR: CLEAR
AST SERPL W P-5'-P-CCNC: 55 U/L (ref 9–39)
BASOPHILS # BLD AUTO: 0.08 X10*3/UL (ref 0–0.1)
BASOPHILS NFR BLD AUTO: 0.5 %
BILIRUB SERPL-MCNC: 1.2 MG/DL (ref 0–1.2)
BILIRUB UR STRIP.AUTO-MCNC: NEGATIVE MG/DL
BUN SERPL-MCNC: 21 MG/DL (ref 6–23)
CALCIUM SERPL-MCNC: 8.9 MG/DL (ref 8.6–10.3)
CHLORIDE SERPL-SCNC: 102 MMOL/L (ref 98–107)
CO2 SERPL-SCNC: 30 MMOL/L (ref 21–32)
COLOR UR: YELLOW
CREAT SERPL-MCNC: 1.18 MG/DL (ref 0.5–1.3)
EGFRCR SERPLBLD CKD-EPI 2021: 63 ML/MIN/1.73M*2
EOSINOPHIL # BLD AUTO: 0.21 X10*3/UL (ref 0–0.4)
EOSINOPHIL NFR BLD AUTO: 1.4 %
ERYTHROCYTE [DISTWIDTH] IN BLOOD BY AUTOMATED COUNT: 18.8 % (ref 11.5–14.5)
GLUCOSE SERPL-MCNC: 132 MG/DL (ref 74–99)
GLUCOSE UR STRIP.AUTO-MCNC: ABNORMAL MG/DL
HCT VFR BLD AUTO: 41.3 % (ref 41–52)
HGB BLD-MCNC: 13.4 G/DL (ref 13.5–17.5)
HOLD SPECIMEN: NORMAL
HYALINE CASTS #/AREA URNS AUTO: ABNORMAL /LPF
IMM GRANULOCYTES # BLD AUTO: 0.1 X10*3/UL (ref 0–0.5)
IMM GRANULOCYTES NFR BLD AUTO: 0.7 % (ref 0–0.9)
KETONES UR STRIP.AUTO-MCNC: NEGATIVE MG/DL
LEUKOCYTE ESTERASE UR QL STRIP.AUTO: NEGATIVE
LYMPHOCYTES # BLD AUTO: 1.06 X10*3/UL (ref 0.8–3)
LYMPHOCYTES NFR BLD AUTO: 7 %
MCH RBC QN AUTO: 29.2 PG (ref 26–34)
MCHC RBC AUTO-ENTMCNC: 32.4 G/DL (ref 32–36)
MCV RBC AUTO: 90 FL (ref 80–100)
MONOCYTES # BLD AUTO: 0.78 X10*3/UL (ref 0.05–0.8)
MONOCYTES NFR BLD AUTO: 5.1 %
MUCOUS THREADS #/AREA URNS AUTO: ABNORMAL /LPF
NEUTROPHILS # BLD AUTO: 12.95 X10*3/UL (ref 1.6–5.5)
NEUTROPHILS NFR BLD AUTO: 85.3 %
NITRITE UR QL STRIP.AUTO: NEGATIVE
NRBC BLD-RTO: 0 /100 WBCS (ref 0–0)
PH UR STRIP.AUTO: 6 [PH]
PLATELET # BLD AUTO: 258 X10*3/UL (ref 150–450)
POTASSIUM SERPL-SCNC: 3.7 MMOL/L (ref 3.5–5.3)
PROT SERPL-MCNC: 6.7 G/DL (ref 6.4–8.2)
PROT UR STRIP.AUTO-MCNC: ABNORMAL MG/DL
RBC # BLD AUTO: 4.59 X10*6/UL (ref 4.5–5.9)
RBC # UR STRIP.AUTO: ABNORMAL /UL
RBC #/AREA URNS AUTO: ABNORMAL /HPF
SODIUM SERPL-SCNC: 140 MMOL/L (ref 136–145)
SP GR UR STRIP.AUTO: 1.02
UROBILINOGEN UR STRIP.AUTO-MCNC: ABNORMAL MG/DL
WBC # BLD AUTO: 15.2 X10*3/UL (ref 4.4–11.3)
WBC #/AREA URNS AUTO: ABNORMAL /HPF

## 2024-05-10 PROCEDURE — 80053 COMPREHEN METABOLIC PANEL: CPT | Performed by: INTERNAL MEDICINE

## 2024-05-10 PROCEDURE — 36591 DRAW BLOOD OFF VENOUS DEVICE: CPT

## 2024-05-10 PROCEDURE — 81001 URINALYSIS AUTO W/SCOPE: CPT | Performed by: INTERNAL MEDICINE

## 2024-05-10 PROCEDURE — 96523 IRRIG DRUG DELIVERY DEVICE: CPT

## 2024-05-10 PROCEDURE — 85025 COMPLETE CBC W/AUTO DIFF WBC: CPT | Performed by: INTERNAL MEDICINE

## 2024-05-10 PROCEDURE — 2500000004 HC RX 250 GENERAL PHARMACY W/ HCPCS (ALT 636 FOR OP/ED): Performed by: INTERNAL MEDICINE

## 2024-05-10 RX ORDER — HEPARIN 100 UNIT/ML
500 SYRINGE INTRAVENOUS AS NEEDED
Status: CANCELLED | OUTPATIENT
Start: 2024-05-10

## 2024-05-10 RX ORDER — HEPARIN 100 UNIT/ML
500 SYRINGE INTRAVENOUS AS NEEDED
Status: DISCONTINUED | OUTPATIENT
Start: 2024-05-10 | End: 2024-05-10 | Stop reason: HOSPADM

## 2024-05-10 RX ORDER — HEPARIN SODIUM,PORCINE/PF 10 UNIT/ML
50 SYRINGE (ML) INTRAVENOUS AS NEEDED
Status: CANCELLED | OUTPATIENT
Start: 2024-05-10

## 2024-05-10 RX ADMIN — HEPARIN 500 UNITS: 100 SYRINGE at 08:53

## 2024-05-13 ENCOUNTER — INFUSION (OUTPATIENT)
Dept: HEMATOLOGY/ONCOLOGY | Facility: CLINIC | Age: 79
End: 2024-05-13
Payer: MEDICARE

## 2024-05-13 ENCOUNTER — SOCIAL WORK (OUTPATIENT)
Dept: HEMATOLOGY/ONCOLOGY | Facility: CLINIC | Age: 79
End: 2024-05-13

## 2024-05-13 ENCOUNTER — APPOINTMENT (OUTPATIENT)
Dept: GASTROENTEROLOGY | Facility: CLINIC | Age: 79
End: 2024-05-13
Payer: MEDICARE

## 2024-05-13 ENCOUNTER — NUTRITION (OUTPATIENT)
Dept: RADIATION ONCOLOGY | Facility: CLINIC | Age: 79
End: 2024-05-13

## 2024-05-13 ENCOUNTER — OFFICE VISIT (OUTPATIENT)
Dept: HEMATOLOGY/ONCOLOGY | Facility: CLINIC | Age: 79
End: 2024-05-13
Payer: MEDICARE

## 2024-05-13 VITALS
RESPIRATION RATE: 18 BRPM | OXYGEN SATURATION: 95 % | WEIGHT: 157.41 LBS | HEART RATE: 87 BPM | TEMPERATURE: 97.3 F | SYSTOLIC BLOOD PRESSURE: 108 MMHG | DIASTOLIC BLOOD PRESSURE: 63 MMHG | BODY MASS INDEX: 24.65 KG/M2

## 2024-05-13 DIAGNOSIS — C79.9 METASTATIC MALIGNANT NEOPLASM, UNSPECIFIED SITE (MULTI): ICD-10-CM

## 2024-05-13 DIAGNOSIS — C18.3 MALIGNANT NEOPLASM OF HEPATIC FLEXURE (MULTI): ICD-10-CM

## 2024-05-13 DIAGNOSIS — C18.3 MALIGNANT NEOPLASM OF HEPATIC FLEXURE (MULTI): Primary | ICD-10-CM

## 2024-05-13 PROCEDURE — 96411 CHEMO IV PUSH ADDL DRUG: CPT

## 2024-05-13 PROCEDURE — 2500000004 HC RX 250 GENERAL PHARMACY W/ HCPCS (ALT 636 FOR OP/ED): Performed by: INTERNAL MEDICINE

## 2024-05-13 PROCEDURE — 1159F MED LIST DOCD IN RCRD: CPT | Performed by: INTERNAL MEDICINE

## 2024-05-13 PROCEDURE — 96375 TX/PRO/DX INJ NEW DRUG ADDON: CPT | Mod: INF

## 2024-05-13 PROCEDURE — 1036F TOBACCO NON-USER: CPT | Performed by: INTERNAL MEDICINE

## 2024-05-13 PROCEDURE — 96415 CHEMO IV INFUSION ADDL HR: CPT

## 2024-05-13 PROCEDURE — 99215 OFFICE O/P EST HI 40 MIN: CPT | Performed by: INTERNAL MEDICINE

## 2024-05-13 PROCEDURE — 3078F DIAST BP <80 MM HG: CPT | Performed by: INTERNAL MEDICINE

## 2024-05-13 PROCEDURE — 3074F SYST BP LT 130 MM HG: CPT | Performed by: INTERNAL MEDICINE

## 2024-05-13 PROCEDURE — 96413 CHEMO IV INFUSION 1 HR: CPT

## 2024-05-13 PROCEDURE — 99215 OFFICE O/P EST HI 40 MIN: CPT | Mod: 25 | Performed by: INTERNAL MEDICINE

## 2024-05-13 PROCEDURE — 1160F RVW MEDS BY RX/DR IN RCRD: CPT | Performed by: INTERNAL MEDICINE

## 2024-05-13 RX ORDER — FAMOTIDINE 10 MG/ML
20 INJECTION INTRAVENOUS ONCE AS NEEDED
Status: DISCONTINUED | OUTPATIENT
Start: 2024-05-13 | End: 2024-05-13 | Stop reason: HOSPADM

## 2024-05-13 RX ORDER — DIPHENHYDRAMINE HYDROCHLORIDE 50 MG/ML
50 INJECTION INTRAMUSCULAR; INTRAVENOUS AS NEEDED
OUTPATIENT
Start: 2024-05-28

## 2024-05-13 RX ORDER — PROCHLORPERAZINE MALEATE 10 MG
10 TABLET ORAL EVERY 6 HOURS PRN
OUTPATIENT
Start: 2024-06-11

## 2024-05-13 RX ORDER — PALONOSETRON 0.05 MG/ML
0.25 INJECTION, SOLUTION INTRAVENOUS ONCE
OUTPATIENT
Start: 2024-06-11

## 2024-05-13 RX ORDER — FLUOROURACIL 50 MG/ML
300 INJECTION, SOLUTION INTRAVENOUS ONCE
OUTPATIENT
Start: 2024-06-11

## 2024-05-13 RX ORDER — PALONOSETRON 0.05 MG/ML
0.25 INJECTION, SOLUTION INTRAVENOUS ONCE
OUTPATIENT
Start: 2024-05-28

## 2024-05-13 RX ORDER — HEPARIN 100 UNIT/ML
500 SYRINGE INTRAVENOUS AS NEEDED
Status: CANCELLED | OUTPATIENT
Start: 2024-05-13

## 2024-05-13 RX ORDER — LORAZEPAM 2 MG/ML
1 INJECTION INTRAMUSCULAR AS NEEDED
OUTPATIENT
Start: 2024-06-11

## 2024-05-13 RX ORDER — DIPHENHYDRAMINE HYDROCHLORIDE 50 MG/ML
50 INJECTION INTRAMUSCULAR; INTRAVENOUS AS NEEDED
OUTPATIENT
Start: 2024-06-11

## 2024-05-13 RX ORDER — PALONOSETRON 0.05 MG/ML
0.25 INJECTION, SOLUTION INTRAVENOUS ONCE
Status: COMPLETED | OUTPATIENT
Start: 2024-05-13 | End: 2024-05-13

## 2024-05-13 RX ORDER — LORAZEPAM 2 MG/ML
1 INJECTION INTRAMUSCULAR AS NEEDED
Status: DISCONTINUED | OUTPATIENT
Start: 2024-05-13 | End: 2024-05-13 | Stop reason: HOSPADM

## 2024-05-13 RX ORDER — EPINEPHRINE 0.3 MG/.3ML
0.3 INJECTION SUBCUTANEOUS EVERY 5 MIN PRN
OUTPATIENT
Start: 2024-06-11

## 2024-05-13 RX ORDER — FAMOTIDINE 10 MG/ML
20 INJECTION INTRAVENOUS ONCE AS NEEDED
OUTPATIENT
Start: 2024-06-11

## 2024-05-13 RX ORDER — PROCHLORPERAZINE EDISYLATE 5 MG/ML
10 INJECTION INTRAMUSCULAR; INTRAVENOUS EVERY 6 HOURS PRN
OUTPATIENT
Start: 2024-06-11

## 2024-05-13 RX ORDER — ALBUTEROL SULFATE 0.83 MG/ML
3 SOLUTION RESPIRATORY (INHALATION) AS NEEDED
Status: DISCONTINUED | OUTPATIENT
Start: 2024-05-13 | End: 2024-05-13 | Stop reason: HOSPADM

## 2024-05-13 RX ORDER — ALBUTEROL SULFATE 0.83 MG/ML
3 SOLUTION RESPIRATORY (INHALATION) AS NEEDED
OUTPATIENT
Start: 2024-05-28

## 2024-05-13 RX ORDER — EPINEPHRINE 0.3 MG/.3ML
0.3 INJECTION SUBCUTANEOUS EVERY 5 MIN PRN
Status: DISCONTINUED | OUTPATIENT
Start: 2024-05-13 | End: 2024-05-13 | Stop reason: HOSPADM

## 2024-05-13 RX ORDER — PANTOPRAZOLE SODIUM 40 MG/1
40 TABLET, DELAYED RELEASE ORAL DAILY
Qty: 30 TABLET | Refills: 11 | Status: SHIPPED | OUTPATIENT
Start: 2024-05-13 | End: 2024-06-12

## 2024-05-13 RX ORDER — FLUOROURACIL 50 MG/ML
300 INJECTION, SOLUTION INTRAVENOUS ONCE
OUTPATIENT
Start: 2024-05-28

## 2024-05-13 RX ORDER — ALBUTEROL SULFATE 0.83 MG/ML
3 SOLUTION RESPIRATORY (INHALATION) AS NEEDED
OUTPATIENT
Start: 2024-06-11

## 2024-05-13 RX ORDER — FAMOTIDINE 10 MG/ML
20 INJECTION INTRAVENOUS ONCE AS NEEDED
OUTPATIENT
Start: 2024-05-28

## 2024-05-13 RX ORDER — LORAZEPAM 2 MG/ML
1 INJECTION INTRAMUSCULAR AS NEEDED
OUTPATIENT
Start: 2024-05-28

## 2024-05-13 RX ORDER — EPINEPHRINE 0.3 MG/.3ML
0.3 INJECTION SUBCUTANEOUS EVERY 5 MIN PRN
OUTPATIENT
Start: 2024-05-28

## 2024-05-13 RX ORDER — PROCHLORPERAZINE EDISYLATE 5 MG/ML
10 INJECTION INTRAMUSCULAR; INTRAVENOUS EVERY 6 HOURS PRN
OUTPATIENT
Start: 2024-05-28

## 2024-05-13 RX ORDER — PROCHLORPERAZINE MALEATE 10 MG
10 TABLET ORAL EVERY 6 HOURS PRN
OUTPATIENT
Start: 2024-05-28

## 2024-05-13 RX ORDER — HEPARIN SODIUM,PORCINE/PF 10 UNIT/ML
50 SYRINGE (ML) INTRAVENOUS AS NEEDED
Status: CANCELLED | OUTPATIENT
Start: 2024-05-13

## 2024-05-13 RX ORDER — DIPHENHYDRAMINE HYDROCHLORIDE 50 MG/ML
50 INJECTION INTRAMUSCULAR; INTRAVENOUS AS NEEDED
Status: DISCONTINUED | OUTPATIENT
Start: 2024-05-13 | End: 2024-05-13 | Stop reason: HOSPADM

## 2024-05-13 RX ADMIN — FLUOROURACIL 3500 MG: 50 INJECTION, SOLUTION INTRAVENOUS at 14:28

## 2024-05-13 RX ADMIN — BEVACIZUMAB 358.5 MG: 400 INJECTION, SOLUTION INTRAVENOUS at 11:36

## 2024-05-13 RX ADMIN — DEXAMETHASONE SODIUM PHOSPHATE 12 MG: 10 INJECTION, SOLUTION INTRAMUSCULAR; INTRAVENOUS at 11:01

## 2024-05-13 RX ADMIN — OXALIPLATIN 125 MG: 5 INJECTION, SOLUTION INTRAVENOUS at 11:59

## 2024-05-13 RX ADMIN — PALONOSETRON 250 MCG: 0.05 INJECTION, SOLUTION INTRAVENOUS at 11:00

## 2024-05-13 NOTE — PROGRESS NOTES
"NUTRITION Assessment NOTE    Nutrition Assessment     Reason for Visit:  Oleg Yang is a 78 y.o. male who presents for metastatic adenocarcinoma of the colon. Treatment of FOLFOX and avastin at 25% dose reduction.     RD met with patient to check in during infusion. Patient reports to doing very well, but does appear to have lost weight since last visit. States he is eating well at home, drinking 1-2 Ensure Plus per day.      Lab Results   Component Value Date/Time    GLUCOSE 122 (H) 05/17/2024 0933     05/17/2024 0933    K 4.1 05/17/2024 0933    CL 98 05/17/2024 0933    CO2 22 05/17/2024 0933    ANIONGAP 22 (H) 05/17/2024 0933    BUN 18 05/17/2024 0933    CREATININE 1.07 05/17/2024 0933    EGFR 71 05/17/2024 0933    CALCIUM 9.4 05/17/2024 0933    ALBUMIN 3.5 05/17/2024 0933    ALKPHOS 487 (H) 05/17/2024 0933    PROT 7.2 05/17/2024 0933    AST 53 (H) 05/17/2024 0933    BILITOT 2.8 (H) 05/17/2024 0933    ALT 56 (H) 05/17/2024 0933      Anthropometrics:  Anthropometrics  Height: 170.2 cm (5' 7.01\")  Weight: 77 kg (169 lb 12.1 oz)  BMI (Calculated): 26.58  Weight Change  Weight History / % Weight Change:   20.4kg (44.5#) weight loss x 5 months, 22% body weight   5.6kg (12.3#) x 1 month, 7.3% loss x 1 month  Significant weight loss  RD attempted to speak with patient regarding weight loss. He reports \"my doctor told me not to worry about it. Discussed weight loss in relation to malnutrition status and healing. Encouraged patient to liberalize diet with high calorie foods to maintain weight. He reports he will work on eating more frequently and add additional ONS if necessary.     Wt Readings from Last 20 Encounters:   05/13/24 71.4 kg (157 lb 6.5 oz)   04/29/24 74.1 kg (163 lb 5.8 oz)   04/26/24 73.9 kg (162 lb 14.7 oz)   04/16/24 77 kg (169 lb 12.1 oz)   04/15/24 77 kg (169 lb 12.1 oz)   04/02/24 80 kg (176 lb 5.9 oz)   03/21/24 83 kg (183 lb)   03/15/24 82.4 kg (181 lb 10.5 oz)   03/06/24 82.1 kg (181 lb) " "  01/08/24 91.8 kg (202 lb 6.4 oz)   12/04/23 89.8 kg (198 lb)   10/24/23 85.3 kg (188 lb)   08/25/23 78.9 kg (174 lb)   08/23/23 80.7 kg (178 lb)   08/11/23 77.6 kg (171 lb)   07/27/23 81.6 kg (180 lb)   07/03/23 78.6 kg (173 lb 4 oz)   06/29/23 80.3 kg (177 lb)   05/31/23 84.4 kg (186 lb)   05/15/23 88.9 kg (196 lb)     Food And Nutrient Intake:  Food and Nutrient History  Food and Nutrient History: Patient did not provide diet recall, but both he and daughter report good intake, along with ONS. Suspect patient is eating smaller portions that he realizes/skipping meals more than he realizes. Encouraged him to focus on high calorie foods and to eat ever 2 hours while awake.     Food Preparation  Cooking: Patient, Family  Grocery Shopping: Patient, Family  Dining Out: 1 to 3 times a week    Medication and Complementary/Alternative Medicine Use  Prescription Medication Use: farxiga, elliquis, apixaban    Nutrition Focused Physical Exam Findings:      Subcutaneous Fat Loss  Orbital Fat Pads: Mild-Moderate (slight dark circles and slight hollowing)  Buccal Fat Pads: Mild-Moderate (flat cheeks, minimal bounce)    Muscle Wasting  Temporalis: Mild-Moderate (slight depression)  Pectoralis (Clavicular Region): Mild-Moderate (some protrusion of clavicle)  Interosseous: Mild-Moderate (slightly depressed area between thumb and forefinger)  Quadriceps: Mild-Moderate (mild depression on inner and outer thigh)  Gastrocnemius: Mild-Moderate (not well developed muscle)    Physical Findings (Nutrition Deficiency/Toxicity)  Hair: Negative  Eyes: Negative  Mouth: Negative  Nails: Negative  Skin: Negative    Energy Needs  Calculated Energy Needs Using Equations  Height: 170 cm (5' 6.93\")  Weight Used for Equation Calculations: 76.6 kg (168 lb 14 oz)  [place] Equation (Overweight or Obese Patients): 1439    1900-2300kcals (25-30kcals/kg)  77-92g protein (1-1.2g/kg)  1900-2300mL fluid (25-30mL/kg)    Nutrition Diagnosis "   Malnutrition Diagnosis  Patient has Malnutrition Diagnosis: Yes  Diagnosis Status: New  Malnutrition Diagnosis: Moderate malnutrition related to chronic disease or condition  As Evidenced by: significant recent weight loss, moderate muscle and subcutaneous fat losses    Nutrition Diagnosis  Patient has Nutrition Diagnosis: Yes  Diagnosis Status (1): New  Nutrition Diagnosis 1: Increased nutrient needs  Related to (1): catabolic disease and treatment  As Evidenced by (1): bevacizumab and FOLFOX, metastatic colon    Nutrition Interventions/Recommendations   Nutrition Prescription  Individualized Nutrition Prescription Provided for : diet for cancer care    Food and Nutrition Delivery  Food and Nutrition Delivery  Meals & Snacks: Energy-modified diet, Protein-modified diet  Goals: Patient will focus on healthy/whole foods based diet, but ensure he is getting adequate protein and calories to meet nutrient needs.  Other:: Fluid  Goals: Patient will meet RD recommended fluid needs through fluids and high fluid foods.    Nutrition Education  Nutrition Education  Nutrition Education Content: Content related nutrition education  Goals: patient will understand the role diet plays in cancer care, recovery, and survivorship    Coordination of Care  Coordination of Nutrition Care by a Nutrition Professional  Collaboration and referral of nutrition care: Collaboration by nutrition professional with other providers  Goals: RD will continue to work with oncology team to ensure best outcomes possible    There are no Patient Instructions on file for this visit.    Nutrition Monitoring and Evaluation   Food/Nutrient Related History Monitoring  Monitoring and Evaluation Plan: Energy intake, Protein intake, Meal/snack pattern  Energy Intake: Estimated energy intake  Criteria: patient will meet RD calculated caloric needs to support nutrition during treatment  Meal/Snack Pattern: Estimated meal and snack pattern  Criteria: patient will  meet caloric needs by modifiying time and amount of food consumed (i.e. 5-6 small meals per day)  Estimated protein intake: Estimated protein intake  Criteria: patient will meet RD calculated protein needs via foods and adding ONS if needed  Body Composition/Growth/Weight History  Monitoring and Evaluation Plan: Weight change  Weight Change: Weight change percentage  Criteria: patient will maintain weight throughout treatment  Biochemical Data, Medical Tests and Procedures  Monitoring and Evaluation Plan: Electrolyte/renal panel  Electrolyte and Renal Panel: BUN, Calcium, serum, Chloride, Creatinine, Magnesium, Phosphorus, Potassium, Sodium  Nutrition Focused Physical Findings  Monitoring and Evaluation Plan: Muscles  Muscles: Muscle atrophy  Criteria: patient will maintain muscle mass throughout treatment    Time Spent  Prep time on day of patient encounter: 15 minutes  Time spent directly with patient, family or caregiver: 20 minutes  Documentation Time: 15 minutes

## 2024-05-13 NOTE — SIGNIFICANT EVENT

## 2024-05-13 NOTE — PROGRESS NOTES
Followed up with pt and his dtr. Pt indicates he is feeling ornery and feisty. Pt was joking but still seems to be pushing forward. Pt has help from his wife at home and his children when needed. Pt is doing what he can but mentions he does rest and take breaks when needed. Pt is using a cane for distances. Pt does admit to being frustrated over not being able to do the things he did before but is positive he will get back to that. Pt in good spirits and is coping and managing at this time.

## 2024-05-13 NOTE — PROGRESS NOTES
A colonoscopy on March 6, 2024 revealed a large ulcerating mass and hepatic flexure.  Biopsy consistent with adenocarcinoma.  Liver, right lobe liver biopsy on March 7, 2024 revealed moderately differentiated adenocarcinoma involving the liver compatible with colorectal origin.  Protein:  Result     MLH-1:  Expression Present                                    PMS-2: Expression Present                                    MSH-2: Expression Present                                    MSH-6: Expression Present     .  .7 on March 3, 2024.  CA 19-9 23,616 also on March 3, 2024  Oleg Yang is a 78 y.o. male with past medical history of ASHD, HTN, HFrEF (with ICD; EF 20-25%), Afib (on eliquis) who presented to [facility] ED on 3/3/24 with chest and abdominal pain. On 3/02/24 morning, patient mentions waking up around 1am to a sensation of being shocked. His legs jolted up and then he had some chest pain. He also endorses severe abdominal pain which he describes as being 10/10 and diffuse.     In the ED, vitals were stable. Work-up significant for , trop 62. CTAP showed widespread metastatic disease to lungs, liver, and retroperitoneal and retrocrural adenopathy, suspicious for metastatic colon cancer. Heme/Onc was consulted for evaluation of suspected metastatic colon cancer.     On evaluation today, vitals significant for SBP in the 80s. Interval labs notable for Cr 1.42 (from 1.32; baseline around ~1.2), Hgb 8.0 (baseline ~13), RDW elevated to 16.3. Tumor markers significant for CA 19-9 elevated to 23,616, .5, and AFP <4. CTAP read by Radiologist as showing findings compatible with widespread metastatic disease including in the lungs  with numerous hepatic metastatic disease also retroperitoneal and retrocrural adenopathy is seen. Left upper quadrant soft tissue mass seen between the stomach in the liver. Findings favor metastatic colon carcinoma given the appearance. GI has been consulted as well, and  "advised IR biopsy of hepatic lesion. Patient mentions that he has no family history of cancer. He mentions that he has never smoked as well. He endorses some weight loss of around 10 pounds over the last year; however, he mentions that his weight fluctuates a lot due to his heart failure. He mentions that his symptoms began around Friday (03/02/24) with generalized vague abdominal pain and nausea. He has not been constipated and mentions that his most recent bowel movement had no signs of blood and was brown.    The patient and family had come for follow-up on 5/13/2024 regarding history of metastatic adenocarcinoma of colon with liver and lung mets.  The patient complains of easy fatigability requesting assistance.  Since last evaluation the patient did receive intravenous iron and is beginning to feel better.Tolerated cycle 2 FOLFOX with Avastin.     A 10 point ROS was completed and is negative expect as stated in HPI.      Past Medical History: As stated above     Past Surgical History: As stated above; knee surgery (06/2021), total hip replacement (06/2021)     Social History: No tobacco use, social drinker (1 drink/week), no illicit drug use,      Family History: Mother and Father - Heart Disease;      Objective:     BP 85/52 (Patient Position: Sitting)   Pulse 82   Temp 36.7 °C (98.1 °F)   Resp 18   Ht 1.702 m (5' 7\")   Wt 82.1 kg (181 lb)   SpO2 96%   BMI 28.35 kg/m²      Physical Exam:  GEN: conversant, NAD  HEENT: PERRL, EOMI, MMM OP clear, TMs clear bilaterally  NECK: supple, no cervical LAD, no carotid bruits  CV: S1, S2, regular, no murmur  PULM: CTAB  ABD: soft, NT, ND, BS+, mild tenderness to palpation in lower quadrants.  EXT: no LE edema  NEURO: no gross focal deficits  PSYCH: appropriate affect      Assessment/Plan:  Oleg Yang is a 78 y.o. male with past medical history of ASHD, HTN, HFrEF (with ICD; EF 20-25%), Afib (on eliquis) who presented to [facility] ED on 3/3/24 with chest and abdominal " pain. Heme/Onc was consulted for evaluation of suspected metastatic colon cancer.    Metastatic adenocarcinoma of colon, .7 on March 3, 2024.    Recommend PET scan, molecular studies on the biopsy specimen, consider port placement if agreeable for chemotherapy.    Severe iron deficiency anemia:    Recommend Feraheme 510 mg weekly x 2 weeks.  Effect side effects explained.  Return in 3 weeks.  The patient and family had come for follow-up on April 2, 2024 regarding history of widespread metastatic adenocarcinoma of colon with liver and lung mets.  The patient did have iron deficiency component for which she received intravenous Feraheme 510 mg/week x 2 weeks and is beginning to feel better.  A PET scan revealed ascending colon mass liver mets and lung mets.  I had a detailed discussion with the patient and family explained to them that options include do nothing, clinical trials, consider palliative chemotherapy with FOLFOX with Avastin.  I have recommended molecular studies as well.  After thinking through all the options the patient and family are agreeable to initiate chemotherapy with FOLFOX with Avastin at 25% dose reduction.  [id number] being placed on April 8, 2024.  Chemotherapy to begin after port placement and approval.  Return in 2 weeks.  The patient and family had come for follow-up on 5/13,2024 regarding history of metastatic adenocarcinoma of colon with liver and lung mets.  The patient complains of easy fatigability requesting assistance.  Since last evaluation the patient did receive intravenous iron and is beginning to feel better.Tolerated cycle 2 FOLFOX with Avastin.Cycle 3 today

## 2024-05-15 ENCOUNTER — INFUSION (OUTPATIENT)
Dept: HEMATOLOGY/ONCOLOGY | Facility: CLINIC | Age: 79
End: 2024-05-15
Payer: MEDICARE

## 2024-05-15 VITALS
HEART RATE: 94 BPM | OXYGEN SATURATION: 95 % | SYSTOLIC BLOOD PRESSURE: 122 MMHG | RESPIRATION RATE: 16 BRPM | DIASTOLIC BLOOD PRESSURE: 73 MMHG | TEMPERATURE: 99.7 F

## 2024-05-15 DIAGNOSIS — C79.9 METASTATIC MALIGNANT NEOPLASM, UNSPECIFIED SITE (MULTI): ICD-10-CM

## 2024-05-15 DIAGNOSIS — C18.3 MALIGNANT NEOPLASM OF HEPATIC FLEXURE (MULTI): ICD-10-CM

## 2024-05-15 PROCEDURE — 96372 THER/PROPH/DIAG INJ SC/IM: CPT

## 2024-05-15 PROCEDURE — 2500000004 HC RX 250 GENERAL PHARMACY W/ HCPCS (ALT 636 FOR OP/ED): Mod: JZ | Performed by: INTERNAL MEDICINE

## 2024-05-15 RX ORDER — HEPARIN SODIUM,PORCINE/PF 10 UNIT/ML
50 SYRINGE (ML) INTRAVENOUS AS NEEDED
OUTPATIENT
Start: 2024-05-15

## 2024-05-15 RX ORDER — HEPARIN 100 UNIT/ML
500 SYRINGE INTRAVENOUS AS NEEDED
Status: DISCONTINUED | OUTPATIENT
Start: 2024-05-15 | End: 2024-05-15 | Stop reason: HOSPADM

## 2024-05-15 RX ORDER — HEPARIN 100 UNIT/ML
500 SYRINGE INTRAVENOUS AS NEEDED
OUTPATIENT
Start: 2024-05-15

## 2024-05-15 RX ADMIN — HEPARIN 500 UNITS: 100 SYRINGE at 12:38

## 2024-05-15 RX ADMIN — PEGFILGRASTIM 6 MG: 6 INJECTION SUBCUTANEOUS at 12:42

## 2024-05-15 ASSESSMENT — PAIN SCALES - GENERAL: PAINLEVEL: 0-NO PAIN

## 2024-05-15 NOTE — PROGRESS NOTES
1245 VSS, pt c/o slight throat pain like after last chemo cycle, low grade temp noted. Pt instr on use of Tylenol for pain & temp, increase in fluids also, pt verbalizes understanding. Pt disch amb w/cane, home w/visitor.

## 2024-05-17 ENCOUNTER — HOSPITAL ENCOUNTER (INPATIENT)
Facility: HOSPITAL | Age: 79
LOS: 1 days | Discharge: HOSPICE | DRG: 871 | End: 2024-05-17
Attending: EMERGENCY MEDICINE | Admitting: INTERNAL MEDICINE
Payer: MEDICARE

## 2024-05-17 ENCOUNTER — APPOINTMENT (OUTPATIENT)
Dept: RADIOLOGY | Facility: HOSPITAL | Age: 79
DRG: 871 | End: 2024-05-17
Payer: MEDICARE

## 2024-05-17 ENCOUNTER — HOSPITAL ENCOUNTER (INPATIENT)
Facility: HOSPITAL | Age: 79
LOS: 2 days | Discharge: HOSPICE | DRG: 951 | End: 2024-05-19
Attending: INTERNAL MEDICINE | Admitting: INTERNAL MEDICINE
Payer: OTHER MISCELLANEOUS

## 2024-05-17 ENCOUNTER — APPOINTMENT (OUTPATIENT)
Dept: CARDIOLOGY | Facility: HOSPITAL | Age: 79
DRG: 871 | End: 2024-05-17
Payer: MEDICARE

## 2024-05-17 VITALS
RESPIRATION RATE: 20 BRPM | TEMPERATURE: 98.6 F | HEART RATE: 83 BPM | OXYGEN SATURATION: 98 % | DIASTOLIC BLOOD PRESSURE: 51 MMHG | BODY MASS INDEX: 25.57 KG/M2 | WEIGHT: 162.92 LBS | HEIGHT: 67 IN | SYSTOLIC BLOOD PRESSURE: 94 MMHG

## 2024-05-17 DIAGNOSIS — Z51.5 END OF LIFE CARE: Primary | ICD-10-CM

## 2024-05-17 DIAGNOSIS — K80.50 CHOLEDOCHOLITHIASIS: ICD-10-CM

## 2024-05-17 DIAGNOSIS — A41.9 SEPTIC SHOCK (MULTI): ICD-10-CM

## 2024-05-17 DIAGNOSIS — C18.3 MALIGNANT NEOPLASM OF HEPATIC FLEXURE (MULTI): ICD-10-CM

## 2024-05-17 DIAGNOSIS — R65.21 SEPTIC SHOCK (MULTI): ICD-10-CM

## 2024-05-17 DIAGNOSIS — R10.84 GENERALIZED ABDOMINAL PAIN: Primary | ICD-10-CM

## 2024-05-17 LAB
ALBUMIN SERPL BCP-MCNC: 3.5 G/DL (ref 3.4–5)
ALP SERPL-CCNC: 487 U/L (ref 33–136)
ALT SERPL W P-5'-P-CCNC: 56 U/L (ref 10–52)
AMMONIA PLAS-SCNC: 31 UMOL/L (ref 16–53)
ANION GAP BLDV CALCULATED.4IONS-SCNC: 19 MMOL/L (ref 10–25)
ANION GAP SERPL CALC-SCNC: 22 MMOL/L (ref 10–20)
AST SERPL W P-5'-P-CCNC: 53 U/L (ref 9–39)
BASE EXCESS BLDV CALC-SCNC: -7.7 MMOL/L (ref -2–3)
BASOPHILS # BLD MANUAL: 0 X10*3/UL (ref 0–0.1)
BASOPHILS NFR BLD MANUAL: 0 %
BILIRUB SERPL-MCNC: 2.8 MG/DL (ref 0–1.2)
BODY TEMPERATURE: 37 DEGREES CELSIUS
BUN SERPL-MCNC: 18 MG/DL (ref 6–23)
CA-I BLDV-SCNC: 1.18 MMOL/L (ref 1.1–1.33)
CALCIUM SERPL-MCNC: 9.4 MG/DL (ref 8.6–10.3)
CARDIAC TROPONIN I PNL SERPL HS: 58 NG/L (ref 0–20)
CARDIAC TROPONIN I PNL SERPL HS: 74 NG/L (ref 0–20)
CHLORIDE BLDV-SCNC: 96 MMOL/L (ref 98–107)
CHLORIDE SERPL-SCNC: 98 MMOL/L (ref 98–107)
CO2 SERPL-SCNC: 22 MMOL/L (ref 21–32)
CREAT SERPL-MCNC: 1.07 MG/DL (ref 0.5–1.3)
CRITICAL CALL TIME: 1029
CRITICAL CALLED BY: ABNORMAL
CRITICAL CALLED TO: ABNORMAL
CRITICAL READ BACK: ABNORMAL
DACRYOCYTES BLD QL SMEAR: ABNORMAL
EGFRCR SERPLBLD CKD-EPI 2021: 71 ML/MIN/1.73M*2
EOSINOPHIL # BLD MANUAL: 0 X10*3/UL (ref 0–0.4)
EOSINOPHIL NFR BLD MANUAL: 0 %
ERYTHROCYTE [DISTWIDTH] IN BLOOD BY AUTOMATED COUNT: 18.9 % (ref 11.5–14.5)
FLUAV RNA RESP QL NAA+PROBE: NOT DETECTED
FLUBV RNA RESP QL NAA+PROBE: NOT DETECTED
GLUCOSE BLDV-MCNC: 132 MG/DL (ref 74–99)
GLUCOSE SERPL-MCNC: 122 MG/DL (ref 74–99)
HCO3 BLDV-SCNC: 17.5 MMOL/L (ref 22–26)
HCT VFR BLD AUTO: 47.3 % (ref 41–52)
HCT VFR BLD EST: 42 % (ref 41–52)
HGB BLD-MCNC: 14.9 G/DL (ref 13.5–17.5)
HGB BLDV-MCNC: 13.9 G/DL (ref 13.5–17.5)
IMM GRANULOCYTES # BLD AUTO: 5.75 X10*3/UL (ref 0–0.5)
IMM GRANULOCYTES NFR BLD AUTO: 9.5 % (ref 0–0.9)
INHALED O2 CONCENTRATION: 50 %
LACTATE BLDV-SCNC: 10.2 MMOL/L (ref 0.4–2)
LACTATE SERPL-SCNC: 4.5 MMOL/L (ref 0.4–2)
LACTATE SERPL-SCNC: 9.1 MMOL/L (ref 0.4–2)
LIPASE SERPL-CCNC: 182 U/L (ref 9–82)
LYMPHOCYTES # BLD MANUAL: 2.43 X10*3/UL (ref 0.8–3)
LYMPHOCYTES NFR BLD MANUAL: 4 %
MCH RBC QN AUTO: 29.2 PG (ref 26–34)
MCHC RBC AUTO-ENTMCNC: 31.5 G/DL (ref 32–36)
MCV RBC AUTO: 93 FL (ref 80–100)
MONOCYTES # BLD MANUAL: 0.61 X10*3/UL (ref 0.05–0.8)
MONOCYTES NFR BLD MANUAL: 1 %
MRSA DNA SPEC QL NAA+PROBE: NOT DETECTED
NEUTROPHILS # BLD MANUAL: 57.66 X10*3/UL (ref 1.6–5.5)
NEUTS BAND # BLD MANUAL: 1.82 X10*3/UL (ref 0–0.5)
NEUTS BAND NFR BLD MANUAL: 3 %
NEUTS SEG # BLD MANUAL: 55.84 X10*3/UL (ref 1.6–5)
NEUTS SEG NFR BLD MANUAL: 92 %
NRBC BLD-RTO: 0 /100 WBCS (ref 0–0)
OVALOCYTES BLD QL SMEAR: ABNORMAL
OXYHGB MFR BLDV: 38.2 % (ref 45–75)
PCO2 BLDV: 34 MM HG (ref 41–51)
PH BLDV: 7.32 PH (ref 7.33–7.43)
PLATELET # BLD AUTO: 339 X10*3/UL (ref 150–450)
PO2 BLDV: 31 MM HG (ref 35–45)
POTASSIUM BLDV-SCNC: 4.1 MMOL/L (ref 3.5–5.3)
POTASSIUM SERPL-SCNC: 4.1 MMOL/L (ref 3.5–5.3)
PROT SERPL-MCNC: 7.2 G/DL (ref 6.4–8.2)
RBC # BLD AUTO: 5.1 X10*6/UL (ref 4.5–5.9)
RBC MORPH BLD: ABNORMAL
SAO2 % BLDV: 39 % (ref 45–75)
SARS-COV-2 RNA RESP QL NAA+PROBE: NOT DETECTED
SODIUM BLDV-SCNC: 128 MMOL/L (ref 136–145)
SODIUM SERPL-SCNC: 138 MMOL/L (ref 136–145)
TOTAL CELLS COUNTED BLD: 100
WBC # BLD AUTO: 60.7 X10*3/UL (ref 4.4–11.3)

## 2024-05-17 PROCEDURE — 74177 CT ABD & PELVIS W/CONTRAST: CPT

## 2024-05-17 PROCEDURE — 85007 BL SMEAR W/DIFF WBC COUNT: CPT | Performed by: EMERGENCY MEDICINE

## 2024-05-17 PROCEDURE — 2500000001 HC RX 250 WO HCPCS SELF ADMINISTERED DRUGS (ALT 637 FOR MEDICARE OP)

## 2024-05-17 PROCEDURE — 82140 ASSAY OF AMMONIA: CPT | Performed by: EMERGENCY MEDICINE

## 2024-05-17 PROCEDURE — 2550000001 HC RX 255 CONTRASTS: Performed by: EMERGENCY MEDICINE

## 2024-05-17 PROCEDURE — 87636 SARSCOV2 & INF A&B AMP PRB: CPT | Performed by: EMERGENCY MEDICINE

## 2024-05-17 PROCEDURE — 84132 ASSAY OF SERUM POTASSIUM: CPT | Mod: 91 | Performed by: EMERGENCY MEDICINE

## 2024-05-17 PROCEDURE — 71045 X-RAY EXAM CHEST 1 VIEW: CPT | Mod: FOREIGN READ | Performed by: RADIOLOGY

## 2024-05-17 PROCEDURE — 96361 HYDRATE IV INFUSION ADD-ON: CPT

## 2024-05-17 PROCEDURE — 1150000001 HC HOSPICE PRIVATE ROOM DAILY

## 2024-05-17 PROCEDURE — 2020000001 HC ICU ROOM DAILY

## 2024-05-17 PROCEDURE — 94660 CPAP INITIATION&MGMT: CPT

## 2024-05-17 PROCEDURE — 3E033XZ INTRODUCTION OF VASOPRESSOR INTO PERIPHERAL VEIN, PERCUTANEOUS APPROACH: ICD-10-PCS

## 2024-05-17 PROCEDURE — 2500000004 HC RX 250 GENERAL PHARMACY W/ HCPCS (ALT 636 FOR OP/ED)

## 2024-05-17 PROCEDURE — 85027 COMPLETE CBC AUTOMATED: CPT | Performed by: EMERGENCY MEDICINE

## 2024-05-17 PROCEDURE — 96374 THER/PROPH/DIAG INJ IV PUSH: CPT

## 2024-05-17 PROCEDURE — 99291 CRITICAL CARE FIRST HOUR: CPT | Performed by: EMERGENCY MEDICINE

## 2024-05-17 PROCEDURE — 87641 MR-STAPH DNA AMP PROBE: CPT

## 2024-05-17 PROCEDURE — 2500000005 HC RX 250 GENERAL PHARMACY W/O HCPCS

## 2024-05-17 PROCEDURE — 71045 X-RAY EXAM CHEST 1 VIEW: CPT

## 2024-05-17 PROCEDURE — 96365 THER/PROPH/DIAG IV INF INIT: CPT

## 2024-05-17 PROCEDURE — 83690 ASSAY OF LIPASE: CPT | Performed by: EMERGENCY MEDICINE

## 2024-05-17 PROCEDURE — 84484 ASSAY OF TROPONIN QUANT: CPT | Performed by: EMERGENCY MEDICINE

## 2024-05-17 PROCEDURE — 93005 ELECTROCARDIOGRAM TRACING: CPT

## 2024-05-17 PROCEDURE — 87040 BLOOD CULTURE FOR BACTERIA: CPT | Mod: PARLAB | Performed by: EMERGENCY MEDICINE

## 2024-05-17 PROCEDURE — 96366 THER/PROPH/DIAG IV INF ADDON: CPT

## 2024-05-17 PROCEDURE — A4217 STERILE WATER/SALINE, 500 ML: HCPCS | Performed by: EMERGENCY MEDICINE

## 2024-05-17 PROCEDURE — 2500000005 HC RX 250 GENERAL PHARMACY W/O HCPCS: Performed by: EMERGENCY MEDICINE

## 2024-05-17 PROCEDURE — 2500000004 HC RX 250 GENERAL PHARMACY W/ HCPCS (ALT 636 FOR OP/ED): Performed by: INTERNAL MEDICINE

## 2024-05-17 PROCEDURE — 2500000004 HC RX 250 GENERAL PHARMACY W/ HCPCS (ALT 636 FOR OP/ED): Performed by: EMERGENCY MEDICINE

## 2024-05-17 PROCEDURE — 36415 COLL VENOUS BLD VENIPUNCTURE: CPT | Performed by: EMERGENCY MEDICINE

## 2024-05-17 PROCEDURE — 83605 ASSAY OF LACTIC ACID: CPT | Performed by: EMERGENCY MEDICINE

## 2024-05-17 PROCEDURE — 96375 TX/PRO/DX INJ NEW DRUG ADDON: CPT

## 2024-05-17 PROCEDURE — 5A0935A ASSISTANCE WITH RESPIRATORY VENTILATION, LESS THAN 24 CONSECUTIVE HOURS, HIGH NASAL FLOW/VELOCITY: ICD-10-PCS

## 2024-05-17 RX ORDER — ONDANSETRON HYDROCHLORIDE 2 MG/ML
4 INJECTION, SOLUTION INTRAVENOUS ONCE
Status: COMPLETED | OUTPATIENT
Start: 2024-05-17 | End: 2024-05-17

## 2024-05-17 RX ORDER — GLYCOPYRROLATE 0.2 MG/ML
0.2 INJECTION INTRAMUSCULAR; INTRAVENOUS EVERY 4 HOURS PRN
Status: DISCONTINUED | OUTPATIENT
Start: 2024-05-17 | End: 2024-05-19 | Stop reason: HOSPADM

## 2024-05-17 RX ORDER — ACETAMINOPHEN 650 MG/1
650 SUPPOSITORY RECTAL EVERY 4 HOURS PRN
Status: DISCONTINUED | OUTPATIENT
Start: 2024-05-17 | End: 2024-05-19 | Stop reason: HOSPADM

## 2024-05-17 RX ORDER — POLYETHYLENE GLYCOL 3350 17 G/17G
17 POWDER, FOR SOLUTION ORAL DAILY PRN
Status: DISCONTINUED | OUTPATIENT
Start: 2024-05-17 | End: 2024-05-17 | Stop reason: HOSPADM

## 2024-05-17 RX ORDER — HEPARIN SODIUM 5000 [USP'U]/ML
5000 INJECTION, SOLUTION INTRAVENOUS; SUBCUTANEOUS EVERY 8 HOURS SCHEDULED
Status: DISCONTINUED | OUTPATIENT
Start: 2024-05-17 | End: 2024-05-17 | Stop reason: HOSPADM

## 2024-05-17 RX ORDER — METOPROLOL TARTRATE 1 MG/ML
5 INJECTION, SOLUTION INTRAVENOUS ONCE
Status: COMPLETED | OUTPATIENT
Start: 2024-05-17 | End: 2024-05-17

## 2024-05-17 RX ORDER — FENTANYL CITRATE 50 UG/ML
25 INJECTION, SOLUTION INTRAMUSCULAR; INTRAVENOUS ONCE
Status: COMPLETED | OUTPATIENT
Start: 2024-05-17 | End: 2024-05-17

## 2024-05-17 RX ORDER — MORPHINE SULFATE 2 MG/ML
2 INJECTION, SOLUTION INTRAMUSCULAR; INTRAVENOUS
Status: DISCONTINUED | OUTPATIENT
Start: 2024-05-17 | End: 2024-05-17

## 2024-05-17 RX ORDER — HALOPERIDOL 5 MG/ML
1 INJECTION INTRAMUSCULAR EVERY 4 HOURS PRN
Status: DISCONTINUED | OUTPATIENT
Start: 2024-05-17 | End: 2024-05-19 | Stop reason: HOSPADM

## 2024-05-17 RX ORDER — MORPHINE SULFATE 2 MG/ML
2 INJECTION, SOLUTION INTRAMUSCULAR; INTRAVENOUS EVERY 30 MIN PRN
Status: DISCONTINUED | OUTPATIENT
Start: 2024-05-17 | End: 2024-05-19 | Stop reason: HOSPADM

## 2024-05-17 RX ORDER — PANTOPRAZOLE SODIUM 40 MG/1
40 TABLET, DELAYED RELEASE ORAL DAILY
Status: DISCONTINUED | OUTPATIENT
Start: 2024-05-17 | End: 2024-05-17 | Stop reason: HOSPADM

## 2024-05-17 RX ORDER — MORPHINE SULFATE 4 MG/ML
4 INJECTION, SOLUTION INTRAMUSCULAR; INTRAVENOUS ONCE
Status: COMPLETED | OUTPATIENT
Start: 2024-05-17 | End: 2024-05-17

## 2024-05-17 RX ORDER — NOREPINEPHRINE BITARTRATE 0.03 MG/ML
.01-.5 INJECTION, SOLUTION INTRAVENOUS CONTINUOUS
Status: DISCONTINUED | OUTPATIENT
Start: 2024-05-17 | End: 2024-05-17 | Stop reason: HOSPADM

## 2024-05-17 RX ORDER — IPRATROPIUM BROMIDE AND ALBUTEROL SULFATE 2.5; .5 MG/3ML; MG/3ML
3 SOLUTION RESPIRATORY (INHALATION) ONCE
Status: DISCONTINUED | OUTPATIENT
Start: 2024-05-17 | End: 2024-05-17 | Stop reason: HOSPADM

## 2024-05-17 RX ORDER — LORAZEPAM 2 MG/ML
1 INJECTION INTRAMUSCULAR
Status: DISCONTINUED | OUTPATIENT
Start: 2024-05-17 | End: 2024-05-19 | Stop reason: HOSPADM

## 2024-05-17 RX ORDER — VANCOMYCIN HYDROCHLORIDE 1 G/20ML
INJECTION, POWDER, LYOPHILIZED, FOR SOLUTION INTRAVENOUS DAILY PRN
Status: DISCONTINUED | OUTPATIENT
Start: 2024-05-17 | End: 2024-05-17 | Stop reason: HOSPADM

## 2024-05-17 RX ORDER — VANCOMYCIN HYDROCHLORIDE 1 G/200ML
1000 INJECTION, SOLUTION INTRAVENOUS ONCE
Status: DISCONTINUED | OUTPATIENT
Start: 2024-05-17 | End: 2024-05-17

## 2024-05-17 RX ORDER — ACETAMINOPHEN 325 MG/1
650 TABLET ORAL EVERY 4 HOURS PRN
Status: CANCELLED | OUTPATIENT
Start: 2024-05-17

## 2024-05-17 RX ORDER — POLYETHYLENE GLYCOL 3350 17 G/17G
17 POWDER, FOR SOLUTION ORAL DAILY PRN
Status: CANCELLED | OUTPATIENT
Start: 2024-05-17

## 2024-05-17 RX ORDER — VANCOMYCIN HYDROCHLORIDE 1 G/20ML
INJECTION, POWDER, LYOPHILIZED, FOR SOLUTION INTRAVENOUS DAILY PRN
Status: CANCELLED | OUTPATIENT
Start: 2024-05-17

## 2024-05-17 RX ORDER — ACETAMINOPHEN 325 MG/1
650 TABLET ORAL EVERY 4 HOURS PRN
Status: DISCONTINUED | OUTPATIENT
Start: 2024-05-17 | End: 2024-05-17 | Stop reason: HOSPADM

## 2024-05-17 RX ORDER — PANTOPRAZOLE SODIUM 40 MG/1
40 TABLET, DELAYED RELEASE ORAL DAILY
Status: CANCELLED | OUTPATIENT
Start: 2024-05-18

## 2024-05-17 RX ADMIN — SODIUM CHLORIDE 1000 ML: 9 INJECTION, SOLUTION INTRAVENOUS at 10:15

## 2024-05-17 RX ADMIN — PIPERACILLIN SODIUM AND TAZOBACTAM SODIUM 4.5 G: 4; .5 INJECTION, SOLUTION INTRAVENOUS at 11:00

## 2024-05-17 RX ADMIN — HEPARIN SODIUM 5000 UNITS: 5000 INJECTION INTRAVENOUS; SUBCUTANEOUS at 16:40

## 2024-05-17 RX ADMIN — IOHEXOL 75 ML: 350 INJECTION, SOLUTION INTRAVENOUS at 11:43

## 2024-05-17 RX ADMIN — Medication 0.01 MCG/KG/MIN: at 11:51

## 2024-05-17 RX ADMIN — SODIUM CHLORIDE 1000 ML: 9 INJECTION, SOLUTION INTRAVENOUS at 10:38

## 2024-05-17 RX ADMIN — FENTANYL CITRATE 25 MCG: 50 INJECTION INTRAMUSCULAR; INTRAVENOUS at 16:40

## 2024-05-17 RX ADMIN — Medication 50 L/MIN: at 15:35

## 2024-05-17 RX ADMIN — MORPHINE SULFATE 4 MG: 4 INJECTION, SOLUTION INTRAMUSCULAR; INTRAVENOUS at 10:15

## 2024-05-17 RX ADMIN — PIPERACILLIN SODIUM AND TAZOBACTAM SODIUM 3.38 G: 3; .375 INJECTION, SOLUTION INTRAVENOUS at 17:21

## 2024-05-17 RX ADMIN — METOPROLOL TARTRATE 5 MG: 5 INJECTION INTRAVENOUS at 10:15

## 2024-05-17 RX ADMIN — Medication 60 L/MIN: at 10:25

## 2024-05-17 RX ADMIN — ONDANSETRON 4 MG: 2 INJECTION INTRAMUSCULAR; INTRAVENOUS at 10:14

## 2024-05-17 RX ADMIN — PANTOPRAZOLE SODIUM 40 MG: 40 TABLET, DELAYED RELEASE ORAL at 16:41

## 2024-05-17 RX ADMIN — WATER 1750 MG: 1 INJECTION INTRAMUSCULAR; INTRAVENOUS; SUBCUTANEOUS at 11:48

## 2024-05-17 RX ADMIN — MORPHINE SULFATE 2 MG: 2 INJECTION, SOLUTION INTRAMUSCULAR; INTRAVENOUS at 20:55

## 2024-05-17 SDOH — SOCIAL STABILITY: SOCIAL INSECURITY: DO YOU FEEL ANYONE HAS EXPLOITED OR TAKEN ADVANTAGE OF YOU FINANCIALLY OR OF YOUR PERSONAL PROPERTY?: NO

## 2024-05-17 SDOH — SOCIAL STABILITY: SOCIAL INSECURITY: HAS ANYONE EVER THREATENED TO HURT YOUR FAMILY OR YOUR PETS?: NO

## 2024-05-17 SDOH — SOCIAL STABILITY: SOCIAL INSECURITY: HAVE YOU HAD ANY THOUGHTS OF HARMING ANYONE ELSE?: NO

## 2024-05-17 SDOH — SOCIAL STABILITY: SOCIAL INSECURITY: ABUSE: ADULT

## 2024-05-17 SDOH — SOCIAL STABILITY: SOCIAL INSECURITY: DO YOU FEEL UNSAFE GOING BACK TO THE PLACE WHERE YOU ARE LIVING?: NO

## 2024-05-17 SDOH — SOCIAL STABILITY: SOCIAL INSECURITY: ARE THERE ANY APPARENT SIGNS OF INJURIES/BEHAVIORS THAT COULD BE RELATED TO ABUSE/NEGLECT?: NO

## 2024-05-17 SDOH — SOCIAL STABILITY: SOCIAL INSECURITY: HAVE YOU HAD THOUGHTS OF HARMING ANYONE ELSE?: NO

## 2024-05-17 SDOH — SOCIAL STABILITY: SOCIAL INSECURITY: DOES ANYONE TRY TO KEEP YOU FROM HAVING/CONTACTING OTHER FRIENDS OR DOING THINGS OUTSIDE YOUR HOME?: NO

## 2024-05-17 SDOH — SOCIAL STABILITY: SOCIAL INSECURITY: ARE YOU OR HAVE YOU BEEN THREATENED OR ABUSED PHYSICALLY, EMOTIONALLY, OR SEXUALLY BY ANYONE?: NO

## 2024-05-17 ASSESSMENT — COGNITIVE AND FUNCTIONAL STATUS - GENERAL
HELP NEEDED FOR BATHING: A LITTLE
HELP NEEDED FOR BATHING: A LITTLE
TOILETING: A LITTLE
MOBILITY SCORE: 21
MOBILITY SCORE: 21
WALKING IN HOSPITAL ROOM: A LITTLE
TOILETING: A LITTLE
WALKING IN HOSPITAL ROOM: A LITTLE
CLIMB 3 TO 5 STEPS WITH RAILING: A LOT
DRESSING REGULAR LOWER BODY CLOTHING: A LITTLE
PATIENT BASELINE BEDBOUND: NO
CLIMB 3 TO 5 STEPS WITH RAILING: A LOT
DAILY ACTIVITIY SCORE: 21
DRESSING REGULAR LOWER BODY CLOTHING: A LITTLE
DAILY ACTIVITIY SCORE: 21

## 2024-05-17 ASSESSMENT — PAIN SCALES - GENERAL
PAINLEVEL_OUTOF10: 4
PAINLEVEL_OUTOF10: 10 - WORST POSSIBLE PAIN
PAINLEVEL_OUTOF10: 8
PAINLEVEL_OUTOF10: 7
PAINLEVEL_OUTOF10: 10 - WORST POSSIBLE PAIN

## 2024-05-17 ASSESSMENT — ACTIVITIES OF DAILY LIVING (ADL)
HEARING - RIGHT EAR: FUNCTIONAL
JUDGMENT_ADEQUATE_SAFELY_COMPLETE_DAILY_ACTIVITIES: YES
PATIENT'S MEMORY ADEQUATE TO SAFELY COMPLETE DAILY ACTIVITIES?: YES
WALKS IN HOME: NEEDS ASSISTANCE
TOILETING: NEEDS ASSISTANCE
DRESSING YOURSELF: INDEPENDENT
LACK_OF_TRANSPORTATION: NO
BATHING: NEEDS ASSISTANCE
ADEQUATE_TO_COMPLETE_ADL: YES
GROOMING: INDEPENDENT
HEARING - LEFT EAR: FUNCTIONAL
FEEDING YOURSELF: INDEPENDENT

## 2024-05-17 ASSESSMENT — PATIENT HEALTH QUESTIONNAIRE - PHQ9
2. FEELING DOWN, DEPRESSED OR HOPELESS: SEVERAL DAYS
1. LITTLE INTEREST OR PLEASURE IN DOING THINGS: SEVERAL DAYS
SUM OF ALL RESPONSES TO PHQ9 QUESTIONS 1 & 2: 2

## 2024-05-17 ASSESSMENT — PAIN - FUNCTIONAL ASSESSMENT
PAIN_FUNCTIONAL_ASSESSMENT: 0-10

## 2024-05-17 ASSESSMENT — PAIN DESCRIPTION - LOCATION: LOCATION: ABDOMEN

## 2024-05-17 ASSESSMENT — LIFESTYLE VARIABLES
TOTAL SCORE: 0
EVER HAD A DRINK FIRST THING IN THE MORNING TO STEADY YOUR NERVES TO GET RID OF A HANGOVER: NO
HOW OFTEN DO YOU HAVE A DRINK CONTAINING ALCOHOL: NEVER
AUDIT-C TOTAL SCORE: 0
HOW OFTEN DO YOU HAVE 6 OR MORE DRINKS ON ONE OCCASION: NEVER
SKIP TO QUESTIONS 9-10: 1
AUDIT-C TOTAL SCORE: 0
HOW MANY STANDARD DRINKS CONTAINING ALCOHOL DO YOU HAVE ON A TYPICAL DAY: PATIENT DOES NOT DRINK
EVER FELT BAD OR GUILTY ABOUT YOUR DRINKING: NO
SUBSTANCE_ABUSE_PAST_12_MONTHS: NO
HAVE PEOPLE ANNOYED YOU BY CRITICIZING YOUR DRINKING: NO
PRESCIPTION_ABUSE_PAST_12_MONTHS: NO
HAVE YOU EVER FELT YOU SHOULD CUT DOWN ON YOUR DRINKING: NO

## 2024-05-17 ASSESSMENT — PAIN DESCRIPTION - ORIENTATION: ORIENTATION: RIGHT

## 2024-05-17 NOTE — NURSING NOTE
RN Hospice Note    Zhen Yang is a Hospice Patient.   Hospice terminal diagnosis: colon cancer  Physician: Dr. Diana  Visit type: Admission - GIP    Comments/recommendations: This RN present, met with pt and many family, reviewed hospice services and all options with [facility].  Pt/family confirmed goc/poc/code status, desire dnrcc and to proceed with hospice gip admit with goal to wean pressors and airvo, pt hopeful he will be stable to get home in next day or two.  Pt/spouse completed [facility] JVL consents and HE'S, DNRCC generated.  Epic chat to [facility] medical team to confirm who will complete gip process.  Pt having pain, sob.  This RN will continue to follow up and assist with GIP process.  Thanks for the consult.     ADDENDUM 2007:   This RN updated [facility] medical team on challenges with gip process, per IT, pt has 2 MRN's and this will not allow Gip admit until chart merged.  IT stating this will be completed shortly.  Bedside RN Gabriela and AAKASH Laird will follow up and complete readmit.  Dr. Jamar Gupta updated as well, aware bedside rn will continue to monitor and update her re med order needs once pt readmitted.  Dr. Jamar Gupta aware of recommendations to have opiod infusion d/t pts constant pain and sob on airvo.  Thanks for the collaborative care of this pt and family.  [facility] will revisit tomorrow.      Plan of care reviewed with patient/family members pt, spouse osvaldo, dtpiper tavera and betito, son zhen, manny comer and rodríguez   Plan of care reviewed with hospital staff members: Dr. Diana/Dr. Jamar Gupta/JANI Medrano     Please notify [facility] of any changes in condition. Thank you.  Office: [phone] (8 am-6:30 pm M-F and 8 am-4:30 pm weekends and holidays)   [phone] (6:30 pm-8 am M-F and 4:30 pm-8 am weekends and holidays)    Betito Longoria RN

## 2024-05-17 NOTE — H&P
History Of Present Illness  Oleg Yang is a 78 y.o. male with medical history of SSS s/p ICD, HTN, HFrEF 20-25%, cardiac amyloidosis, A-fib-on amiodarone Eliquis, and recently diagnosed colorectal cancer with mets currently on chemotherapy.  Patient presented to the [facility] ED today with chief complaint of abdominal pain and shortness of breath.  Patient states he has not been able to sleep for the past 3 nights due to the pain.  Patient also states he has been having chills.  He denies any chest pain, nausea vomiting, dysuria, changes in BM, vision changes, headaches.  Upon arrival patient was noted to be hypertensive 88/52 and tachycardic , also noted to be hypoxic requiring Airvo 60%.  Initial labs showed K4.1, creatinine 1.07, alk phos 487, ALT/AST 56/53, T. bili 2.8, lactate 9.1, lipase 182, WBC 60.7, Hgb 14.9.  CT chest abdomen pelvis was obtained which showed progressive enlargement of bilateral pulmonary nodules as well as enlargement of multiple bilobular hepatic lesions.  There is also mild biliary dilation within the common bile duct suspicious for choledocholithiasis as well as distended gallbladder with underlying gallstones and associated gallbladder wall thickening concerning for cholecystitis.  Masslike wall thickening again was identified in the cecum stable compared to prior studies compatible with known diagnosis of primary colon carcinoma.  There were also enlarged retroperitoneal and upper abdominal lymph nodes.  In the ED the patient received 2 L bolus of fluid, started on broad-spectrum antibiotics, and started on Levophed.  Upon arrival to the ICU goals of care discussion was had with the patient and family at bedside, at this time he would like to consider hospice, hospice consultation has been placed, see goals of care note for details.     Past Medical History  Past Medical History:   Diagnosis Date    Other specified health status     No pertinent past medical history       Surgical  "History  Past Surgical History:   Procedure Laterality Date    KNEE SURGERY  06/21/2021    Knee Surgery    OTHER SURGICAL HISTORY  06/21/2021    Cardiac catheterization    TOTAL HIP ARTHROPLASTY  06/21/2021    Total Hip Replacement        Social History  He reports that he has never smoked. He has never used smokeless tobacco. No history on file for alcohol use and drug use.    Family History  Family History   Problem Relation Name Age of Onset    Other (cardiac disorder) Mother      Coronary artery disease Mother      Other (cardiac disorder) Father      Coronary artery disease Father      Hypertension Father      Heart attack Father          Allergies  Patient has no known allergies.    Review of Systems   10 point ros was complete and is negative unless otherwise stated in the HPI   Physical Exam   Physical Exam:   General appearance: acute distress, ill-appearing   HEENT: Atraumatic/normocephalic, moist mucosa  Neck: supple without obvious goiter, JVD not appreciated  Respiratory: good air movement, increased respiratory effort, mild crackles  Cardiovascular: tachy, regular rhythm, no peripheral edema  Abdomen: no organomegaly, tenderness to palpation in all quadrants  Extremities: strong peripheral pulses, no grossly obvious deformities   Skin: intact, no rashes, chemo port noted   Neurologic: Alert and oriented x 3, No obvious focal deficit  Psych: appropriate mood & affect, cooperative   Last Recorded Vitals  Blood pressure 132/61, pulse 85, temperature 37.8 °C (100 °F), resp. rate 12, height 1.702 m (5' 7\"), weight 73.9 kg (163 lb), SpO2 100%.    Relevant Results         Assessment/Plan   Principal Problem:    Generalized abdominal pain    *GOC discussion was had at bedside, patient is now considering hospice, hospice consult has been placed, will continue current management for now.       Neurological System:  No current issues  Avoid excessive caths   Maintain normal sleep/wake cycle     Cardiovascular " System:  Last Echo: 6/21/23 EF 20-25%  #sepsis with shock   #Hx afib on eliquis   #Hx cardiac amyloidosis   #SSS s/p ICD  Levophed, wean as tolerated  Hold all antihypertensives  Heparin drip  Bolus IVF prn     Respiratory System:  #RLL PNA   Encourage use if Incentive Spirometer,  q1  HOB > 30 degrees  Bronch hygiene   Vanc/zosyn  Send resp cultures       Gastrointestinal System:  #Choledocholithiasis vs cholecystitis     #Elevated alk phos  -CT mild biliary dilation within the common bile duct suspicious for choledocholithiasis as well as distended gallbladder with underlying gallstones and associated gallbladder wall thickening concerning for cholecystitis  -GI consult  -Consider hida or ercp   Diet: NPO  Prophylaxis: Protonix    Endocrine System:  No current issues      Renal System:  -Monitor I&Os  -Trend renal panel and replace lectrolytes    Hematological System:  #Colorectal adenocarcinoma with mets   -CT shows enlargement of liver and lung nodules   -Currently on folfox and avastin, last treament 1 week ago   -H&H stable, trend        Infection Disease System:  #Leukocytosis   WBC 60  Blood culture pending     Vascular system & Extremities:  PIV, chemoport         Case to be discussed with attending, A&P above reflect tentative plan. Please await for final signature from attending physician on service.             Blanco Cho,

## 2024-05-17 NOTE — CARE PLAN
The patient's goals for the shift include      The clinical goals for the shift include hemodynamic stablilty    Over the shift, the patient did not make progress toward the following goals. Barriers to progression include acuteness of illness . Recommendations to address these barriers include implementing interventions as ordered   Problem: Nutrition  Goal: Less than 5 days NPO/clear liquids  Outcome: Progressing  Goal: Oral intake greater than 50%  Outcome: Progressing  Goal: Oral intake greater 75%  Outcome: Progressing  Goal: Consume prescribed supplement  Outcome: Progressing  Goal: Adequate PO fluid intake  Outcome: Progressing  Goal: Nutrition support goals are met within 48 hrs  Outcome: Progressing  Goal: Nutrition support is meeting 75% of nutrient needs  Outcome: Progressing  Goal: Tube feed tolerance  Outcome: Progressing  Goal: BG  mg/dL  Outcome: Progressing  Goal: Lab values WNL  Outcome: Progressing  Goal: Electrolytes WNL  Outcome: Progressing  Goal: Promote healing  Outcome: Progressing  Goal: Maintain stable weight  Outcome: Progressing  Goal: Reduce weight from edema/fluid  Outcome: Progressing  Goal: Gradual weight gain  Outcome: Progressing  Goal: Improve ostomy output  Outcome: Progressing     Problem: Pain - Adult  Goal: Verbalizes/displays adequate comfort level or baseline comfort level  Outcome: Progressing     Problem: Safety - Adult  Goal: Free from fall injury  Outcome: Progressing     Problem: Discharge Planning  Goal: Discharge to home or other facility with appropriate resources  Outcome: Progressing     Problem: Chronic Conditions and Co-morbidities  Goal: Patient's chronic conditions and co-morbidity symptoms are monitored and maintained or improved  Outcome: Progressing     Problem: Skin  Goal: Participates in plan/prevention/treatment measures  Outcome: Progressing  Flowsheets (Taken 5/17/2024 1174)  Participates in plan/prevention/treatment measures:   Elevate heels    Increase activity/out of bed for meals  Goal: Prevent/manage excess moisture  Outcome: Progressing  Flowsheets (Taken 5/17/2024 1749)  Prevent/manage excess moisture:   Moisturize dry skin   Monitor for/manage infection if present   Cleanse incontinence/protect with barrier cream  Goal: Prevent/minimize sheer/friction injuries  Outcome: Progressing  Flowsheets (Taken 5/17/2024 1749)  Prevent/minimize sheer/friction injuries:   HOB 30 degrees or less   Turn/reposition every 2 hours/use positioning/transfer devices  Goal: Promote/optimize nutrition  Outcome: Progressing  Flowsheets (Taken 5/17/2024 1749)  Promote/optimize nutrition:   Offer water/supplements/favorite foods   Monitor/record intake including meals     Problem: Pain  Goal: Takes deep breaths with improved pain control throughout the shift  Outcome: Progressing  Goal: Turns in bed with improved pain control throughout the shift  Outcome: Progressing  Goal: Walks with improved pain control throughout the shift  Outcome: Progressing  Goal: Performs ADL's with improved pain control throughout shift  Outcome: Progressing  Goal: Participates in PT with improved pain control throughout the shift  Outcome: Progressing  Goal: Free from opioid side effects throughout the shift  Outcome: Progressing  Goal: Free from acute confusion related to pain meds throughout the shift  Outcome: Progressing   .

## 2024-05-17 NOTE — PROGRESS NOTES
This  met with patient and family at bedside. They are choosing to move forward with hospice. SW presented list to patient and family. Daughter states she has heard of [facility] and would like to use them. Plan is to eventually get patient home with hospice but they are agreeable to GIP initially to make sure he is comfortable. BRYNN sent message to Mar Longoria [facility] RN. She states she will meet with them at 5 pm. BRYNN sent referral through [facility] to [facility]. Family, RN and MD updated.  will continue to follow. Message with questions.  KAREEM Eli

## 2024-05-17 NOTE — CONSULTS
"Vancomycin Dosing by Pharmacy- INITIAL    Oleg Yang is a 78 y.o. year old male who Pharmacy has been consulted for vancomycin dosing for pneumonia. Based on the patient's indication and renal status this patient will be dosed based on a goal AUC of 400-600.     Renal function is currently stable.    Visit Vitals  /58   Pulse 88   Temp 37.8 °C (100 °F)   Resp 26        Lab Results   Component Value Date    CREATININE 1.07 05/17/2024    CREATININE 1.18 05/10/2024    CREATININE 1.30 04/26/2024    CREATININE 1.02 04/12/2024        Patient weight is No results found for: \"PTWEIGHT\"    No results found for: \"CULTURE\"     No intake/output data recorded.  [unfilled]    Lab Results   Component Value Date    PATIENTTEMP 37.0 05/17/2024    PATIENTTEMP 37.0 04/19/2023    PATIENTTEMP 37.0 04/19/2023          Assessment/Plan     Patient has already been given a loading dose of 1750 mg.  Will initiate vancomycin maintenance,  1500 mg every 24 hours.    This dosing regimen is predicted by [employer] to result in the following pharmacokinetic parameters:  Loading dose: 1750 mg  Regimen: 1500 mg IV every 24 hours.  Start time: 23:48 on 05/17/2024  Exposure target: AUC24 (range)400-600 mg/L.hr   AUC24,ss: 580 mg/L.hr  Probability of AUC24 > 400: 87 %  Ctrough,ss: 17.1 mg/L  Probability of Ctrough,ss > 20: 36 %  Probability of nephrotoxicity (Lodise JANELLE 2009): 13 %    Follow-up level will be ordered on 5/18 at 1000 unless clinically indicated sooner.  Will continue to monitor renal function daily while on vancomycin and order serum creatinine at least every 48 hours if not already ordered.  Follow for continued vancomycin needs, clinical response, and signs/symptoms of toxicity.       Kylie Gómez, PharmD       "

## 2024-05-17 NOTE — ED PROVIDER NOTES
HPI   Chief Complaint   Patient presents with    Abdominal Pain    Shortness of Breath    Nausea    Vomiting       78-year-old male who presents by squad for pain.  Patient states has been having abdominal pain he has pain medication at home but he has not been taking it.  He has a history of colon cancer mets to the liver.  He states he had his third treatment of chemotherapy last week.  Patient describes at times some shortness of breath.  Denies any chest pain.  Denies any fevers chills denies any nausea vomiting or diarrhea.  Patient denies any dysuria, frequency or urgency.                          Jules Coma Scale Score: 15                     Patient History   Past Medical History:   Diagnosis Date    Other specified health status     No pertinent past medical history     Past Surgical History:   Procedure Laterality Date    KNEE SURGERY  06/21/2021    Knee Surgery    OTHER SURGICAL HISTORY  06/21/2021    Cardiac catheterization    TOTAL HIP ARTHROPLASTY  06/21/2021    Total Hip Replacement     Family History   Problem Relation Name Age of Onset    Other (cardiac disorder) Mother      Coronary artery disease Mother      Other (cardiac disorder) Father      Coronary artery disease Father      Hypertension Father      Heart attack Father       Social History     Tobacco Use    Smoking status: Never    Smokeless tobacco: Never   Substance Use Topics    Alcohol use: Not on file    Drug use: Not on file       Physical Exam   ED Triage Vitals   Temp Pulse Resp BP   -- -- -- --      SpO2 Temp src Heart Rate Source Patient Position   -- -- -- --      BP Location FiO2 (%)     -- --       Physical Exam  Constitutional:       Appearance: Normal appearance. He is normal weight.   HENT:      Head: Normocephalic and atraumatic.      Nose: Nose normal.      Mouth/Throat:      Mouth: Mucous membranes are moist.      Pharynx: Oropharynx is clear.   Eyes:      Extraocular Movements: Extraocular movements intact.       Conjunctiva/sclera: Conjunctivae normal.      Pupils: Pupils are equal, round, and reactive to light.   Cardiovascular:      Rate and Rhythm: Normal rate and regular rhythm.   Pulmonary:      Effort: Pulmonary effort is normal.      Breath sounds: Normal breath sounds.   Abdominal:      General: Abdomen is flat. Bowel sounds are normal.      Palpations: Abdomen is soft.   Musculoskeletal:         General: Normal range of motion.      Cervical back: Normal range of motion and neck supple.   Skin:     General: Skin is warm and dry.      Capillary Refill: Capillary refill takes less than 2 seconds.   Neurological:      General: No focal deficit present.      Mental Status: He is alert.   Psychiatric:         Mood and Affect: Mood normal.         Behavior: Behavior normal.         Thought Content: Thought content normal.         Judgment: Judgment normal.       Labs Reviewed   CBC WITH AUTO DIFFERENTIAL - Abnormal       Result Value    WBC 60.7 (*)     nRBC 0.0      RBC 5.10      Hemoglobin 14.9      Hematocrit 47.3      MCV 93      MCH 29.2      MCHC 31.5 (*)     RDW 18.9 (*)     Platelets 339      Immature Granulocytes %, Automated 9.5 (*)     Immature Granulocytes Absolute, Automated 5.75 (*)     Narrative:     The previously reported component Neutrophils % is no longer being reported.  The previously reported component Lymphocytes % is no longer being reported.  The previously reported component Monocytes % is no longer being reported.  The previously   reported component Eosinophils % is no longer being reported.  The previously reported component Basophils % is no longer being reported.  The previously reported component Absolute Neutrophils is no longer being reported.  The previously reported   component Absolute Lymphocytes is no longer being reported.  The previously reported component Absolute Monocytes is no longer being reported.  The previously reported component Absolute Eosinophils is no longer being  reported.  The previously reported   component Absolute Basophils is no longer being reported.   COMPREHENSIVE METABOLIC PANEL - Abnormal    Glucose 122 (*)     Sodium 138      Potassium 4.1      Chloride 98      Bicarbonate 22      Anion Gap 22 (*)     Urea Nitrogen 18      Creatinine 1.07      eGFR 71      Calcium 9.4      Albumin 3.5      Alkaline Phosphatase 487 (*)     Total Protein 7.2      AST 53 (*)     Bilirubin, Total 2.8 (*)     ALT 56 (*)    LIPASE - Abnormal    Lipase 182 (*)     Narrative:     Venipuncture immediately after or during the administration of Metamizole may lead to falsely low results. Testing should be performed immediately prior to Metamizole dosing.   LACTATE - Abnormal    Lactate 9.1 (*)     Narrative:     Venipuncture immediately after or during the administration of Metamizole may lead to falsely low results. Testing should be performed immediately  prior to Metamizole dosing.   SERIAL TROPONIN-INITIAL - Abnormal    Troponin I, High Sensitivity 58 (*)     Narrative:     Less than 99th percentile of normal range cutoff-  Female and children under 18 years old <14 ng/L; Male <21 ng/L: Negative  Repeat testing should be performed if clinically indicated.     Female and children under 18 years old 14-50 ng/L; Male 21-50 ng/L:  Consistent with possible cardiac damage and possible increased clinical   risk. Serial measurements may help to assess extent of myocardial damage.     >50 ng/L: Consistent with cardiac damage, increased clinical risk and  myocardial infarction. Serial measurements may help assess extent of   myocardial damage.      NOTE: Children less than 1 year old may have higher baseline troponin   levels and results should be interpreted in conjunction with the overall   clinical context.     NOTE: Troponin I testing is performed using a different   testing methodology at [facility] than at other   [facility] hospitals. Direct result comparisons should only   be  made within the same method.   SERIAL TROPONIN, 1 HOUR - Abnormal    Troponin I, High Sensitivity 74 (*)     Narrative:     Less than 99th percentile of normal range cutoff-  Female and children under 18 years old <14 ng/L; Male <21 ng/L: Negative  Repeat testing should be performed if clinically indicated.     Female and children under 18 years old 14-50 ng/L; Male 21-50 ng/L:  Consistent with possible cardiac damage and possible increased clinical   risk. Serial measurements may help to assess extent of myocardial damage.     >50 ng/L: Consistent with cardiac damage, increased clinical risk and  myocardial infarction. Serial measurements may help assess extent of   myocardial damage.      NOTE: Children less than 1 year old may have higher baseline troponin   levels and results should be interpreted in conjunction with the overall   clinical context.     NOTE: Troponin I testing is performed using a different   testing methodology at [facility] than at other   [facility]. Direct result comparisons should only   be made within the same method.   BLOOD GAS VENOUS FULL PANEL - Abnormal    POCT pH, Venous 7.32 (*)     POCT pCO2, Venous 34 (*)     POCT pO2, Venous 31 (*)     POCT SO2, Venous 39 (*)     POCT Oxy Hemoglobin, Venous 38.2 (*)     POCT Hematocrit Calculated, Venous 42.0      POCT Sodium, Venous 128 (*)     POCT Potassium, Venous 4.1      POCT Chloride, Venous 96 (*)     POCT Ionized Calicum, Venous 1.18      POCT Glucose, Venous 132 (*)     POCT Lactate, Venous 10.2 (*)     POCT Base Excess, Venous -7.7 (*)     POCT HCO3 Calculated, Venous 17.5 (*)     POCT Hemoglobin, Venous 13.9      POCT Anion Gap, Venous 19.0      Patient Temperature 37.0      FiO2 50      Critical Called By ANTHONY SINGH [facility]      Critical Called To DR CARUSO      Critical Call Time 1029      Critical Read Back Y     LACTATE - Abnormal    Lactate 4.5 (*)     Narrative:     Venipuncture immediately after or during the  administration of Metamizole may lead to falsely low results. Testing should be performed immediately  prior to Metamizole dosing.   MANUAL DIFFERENTIAL - Abnormal    Neutrophils %, Manual 92.0      Bands %, Manual 3.0      Lymphocytes %, Manual 4.0      Monocytes %, Manual 1.0      Eosinophils %, Manual 0.0      Basophils %, Manual 0.0      Seg Neutrophils Absolute, Manual 55.84 (*)     Bands Absolute, Manual 1.82 (*)     Lymphocytes Absolute, Manual 2.43      Monocytes Absolute, Manual 0.61      Eosinophils Absolute, Manual 0.00      Basophils Absolute, Manual 0.00      Total Cells Counted 100      Neutrophils Absolute, Manual 57.66 (*)     RBC Morphology See Below      Ovalocytes Few      Teardrop Cells Few     AMMONIA - Normal    Ammonia 31     INFLUENZA A AND B PCR - Normal    Flu A Result Not Detected      Flu B Result Not Detected      Narrative:     This assay is an in vitro diagnostic multiplex nucleic acid amplification test for the detection and discrimination of Influenza A & B from nasopharyngeal specimens, and has been validated for use at [facility]. Negative results do not preclude Influenza A/B infections, and should not be used as the sole basis for diagnosis, treatment, or other management decisions. If Influenza A/B and RSV PCR results are negative, testing for Parainfluenza virus, Adenovirus and Metapneumovirus is routinely performed for [facility] pediatric oncology and intensive care inpatients, and is available on other patients by placing an add-on request.   SARS-COV-2 PCR - Normal    Coronavirus 2019, PCR Not Detected      Narrative:     This assay has received FDA Emergency Use Authorization (EUA) and is only authorized for the duration of time that circumstances exist to justify the authorization of the emergency use of in vitro diagnostic tests for the detection of SARS-CoV-2 virus and/or diagnosis of COVID-19 infection under section 564(b)(1) of the Act, 21 U.S.C.  360bbb-3(b)(1). This assay is an in vitro diagnostic nucleic acid amplification test for the qualitative detection of SARS-CoV-2 from nasopharyngeal specimens and has been validated for use at [facility]. Negative results do not preclude COVID-19 infections and should not be used as the sole basis for diagnosis, treatment, or other management decisions.     BLOOD CULTURE   BLOOD CULTURE   TROPONIN SERIES- (INITIAL, 1 HR)    Narrative:     The following orders were created for panel order Troponin I Series, High Sensitivity (0, 1 HR).  Procedure                               Abnormality         Status                     ---------                               -----------         ------                     Troponin I, High Sensiti...[[id number]]  Abnormal            Final result               Troponin, High Sensitivi...[[id number]]  Abnormal            Final result                 Please view results for these tests on the individual orders.   URINALYSIS WITH REFLEX CULTURE AND MICROSCOPIC    Narrative:     The following orders were created for panel order Urinalysis with Reflex Culture and Microscopic.  Procedure                               Abnormality         Status                     ---------                               -----------         ------                     Urinalysis with Reflex C...[[id number]]                                                 Extra Urine Gray Tube[[id number]]                                                         Please view results for these tests on the individual orders.   URINALYSIS WITH REFLEX CULTURE AND MICROSCOPIC   EXTRA URINE GRAY TUBE   BLOOD GAS LACTIC ACID, VENOUS     CT chest abdomen pelvis w IV contrast   Final Result   1. No pulmonary embolism or acute aortic pathology.   2. Again noted are multiple bilateral pulmonary nodules involving all   lobes slightly progressive compared to prior with the largest nodule   in the right lower lobe measuring  2.6 cm x 2.2 cm previously measuring   2.2 cm x 2 cm. Findings compatible with metastatic disease.    3. Again noted are multiple bilobar hepatic lesions slightly   progressive compared to prior with largest lesion near the dome   measuring 6 cm x 5 cm previously measuring 5 cm x 4.4 cm on prior   PET/CT. Findings compatible with metastatic disease.   4. Mild biliary dilatation with the common bile duct measuring up to   approximately 1 cm. Questionable choledocholithiasis with small stones   in the distal duct measuring 4 to 5 mm. Consider further evaluation   with MRCP.   5. The gallbladder is distended with underlying gallstones and some   associated gallbladder wall thickening. If there is a clinical concern   for cholecystitis, recommend further evaluation with a HIDA scan.   6. Again noted is masslike wall thickening of the cecum largely stable   compared to prior. Findings compatible with a primary colon carcinoma.   7. Mildly enlarged retroperitoneal, upper abdominal and pelvic lymph   nodes similar compared to prior compatible with metastatic disease.   8. Severe coronary artery calcifications.   9. 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   10. Enlargement of the main pulmonary artery measuring 4 cm. Findings   suggestive of pulmonary arterial hypertension.   11. Nonobstructing inferior pole right renal calculus measuring 5 mm   without ureteral calculus or hydronephrosis.   12. Bilateral inguinal hernias containing fat and nonobstructed bowel.   13. Mildly enlarged prostate.   Signed by Erick Freed MD      XR chest 1 view   Final Result   Multiple nodular opacities bilaterally, new compared to 3/26/2023   measuring up to 2.6 cm in the right lung. Findings compatible with   metastatic disease. Recommend further evaluation with CT.   Signed by Erick Freed MD              ED Course & MDM   Diagnoses as of 05/17/24 1355   Generalized abdominal pain   Septic shock (Multi)    Choledocholithiasis       Medical Decision Making  Emergency department course, patient's O2 saturation was low despite being on 5 L of oxygen patient was then placed on Airvo at 60%.  EKG showed A-fib at a rate of 104 with nonspecific ST-T wave changes.  Heart rate was going up into the 150s blood pressure was in the 80s systolic.  He got received 2 L of IV fluids and then started on Levophed IV through his port.  Laboratory studies were obtained and reviewed which showed a white count of 60,000 his initial lactate was 9.1 after 2 L of fluid it came down to 4.5.  Troponin was 58 and repeat of 74.  CT chest, abdomen and pelvis were obtained.  With patient being on Levophed I will discuss with ICU.  Patient is a DNR Comfort Care arrest no intubation.  I discussed the case with critical care physician Dr. Diana who is agreeable with admitting the patient to the ICU.    Amount and/or Complexity of Data Reviewed  ECG/medicine tests: independent interpretation performed.     Details: EKG shows A-fib at a rate of 104 with nonspecific ST-T wave changes, interpreted by ED physician.        Procedure  Critical Care    Performed by: Kalyn Verma DO  Authorized by: Kalyn Verma DO    Critical care provider statement:     Critical care time (minutes):  45    Critical care time was exclusive of:  Separately billable procedures and treating other patients    Critical care was necessary to treat or prevent imminent or life-threatening deterioration of the following conditions:  Sepsis and shock    Critical care was time spent personally by me on the following activities:  Ordering and review of laboratory studies, ordering and review of radiographic studies, pulse oximetry and re-evaluation of patient's condition    Care discussed with: admitting provider         Kalyn Verma DO  05/17/24 4722

## 2024-05-18 PROCEDURE — 2500000001 HC RX 250 WO HCPCS SELF ADMINISTERED DRUGS (ALT 637 FOR MEDICARE OP): Performed by: INTERNAL MEDICINE

## 2024-05-18 PROCEDURE — 1150000001 HC HOSPICE PRIVATE ROOM DAILY

## 2024-05-18 PROCEDURE — 2500000004 HC RX 250 GENERAL PHARMACY W/ HCPCS (ALT 636 FOR OP/ED): Performed by: INTERNAL MEDICINE

## 2024-05-18 PROCEDURE — 2500000002 HC RX 250 W HCPCS SELF ADMINISTERED DRUGS (ALT 637 FOR MEDICARE OP, ALT 636 FOR OP/ED): Performed by: INTERNAL MEDICINE

## 2024-05-18 PROCEDURE — 2500000005 HC RX 250 GENERAL PHARMACY W/O HCPCS: Performed by: INTERNAL MEDICINE

## 2024-05-18 RX ORDER — BISACODYL 10 MG/1
10 SUPPOSITORY RECTAL DAILY PRN
Status: DISCONTINUED | OUTPATIENT
Start: 2024-05-18 | End: 2024-05-19 | Stop reason: HOSPADM

## 2024-05-18 RX ORDER — POLYETHYLENE GLYCOL 3350 17 G/17G
17 POWDER, FOR SOLUTION ORAL DAILY PRN
Status: DISCONTINUED | OUTPATIENT
Start: 2024-05-18 | End: 2024-05-18

## 2024-05-18 RX ORDER — POLYETHYLENE GLYCOL 3350 17 G/17G
17 POWDER, FOR SOLUTION ORAL DAILY
Status: DISCONTINUED | OUTPATIENT
Start: 2024-05-18 | End: 2024-05-19 | Stop reason: HOSPADM

## 2024-05-18 RX ORDER — MAGNESIUM HYDROXIDE 2400 MG/10ML
10 SUSPENSION ORAL DAILY PRN
Status: DISCONTINUED | OUTPATIENT
Start: 2024-05-18 | End: 2024-05-19 | Stop reason: HOSPADM

## 2024-05-18 RX ORDER — FENTANYL 25 UG/1
1 PATCH TRANSDERMAL
Status: DISCONTINUED | OUTPATIENT
Start: 2024-05-18 | End: 2024-05-19 | Stop reason: HOSPADM

## 2024-05-18 RX ORDER — PANTOPRAZOLE SODIUM 40 MG/1
40 TABLET, DELAYED RELEASE ORAL DAILY
Status: DISCONTINUED | OUTPATIENT
Start: 2024-05-18 | End: 2024-05-19 | Stop reason: HOSPADM

## 2024-05-18 RX ORDER — ACETAMINOPHEN 325 MG/1
650 TABLET ORAL EVERY 4 HOURS PRN
Status: DISCONTINUED | OUTPATIENT
Start: 2024-05-18 | End: 2024-05-19 | Stop reason: HOSPADM

## 2024-05-18 RX ORDER — MORPHINE SULFATE ORAL SOLUTION 10 MG/5ML
5 SOLUTION ORAL EVERY 2 HOUR PRN
Status: DISCONTINUED | OUTPATIENT
Start: 2024-05-18 | End: 2024-05-19 | Stop reason: HOSPADM

## 2024-05-18 RX ORDER — VANCOMYCIN HYDROCHLORIDE 1 G/20ML
INJECTION, POWDER, LYOPHILIZED, FOR SOLUTION INTRAVENOUS DAILY PRN
Status: DISCONTINUED | OUTPATIENT
Start: 2024-05-18 | End: 2024-05-18

## 2024-05-18 RX ORDER — MORPHINE SULFATE 10 MG/.5ML
5 SOLUTION ORAL EVERY 2 HOUR PRN
Status: DISCONTINUED | OUTPATIENT
Start: 2024-05-18 | End: 2024-05-18

## 2024-05-18 RX ADMIN — MORPHINE SULFATE 2 MG: 2 INJECTION, SOLUTION INTRAMUSCULAR; INTRAVENOUS at 05:16

## 2024-05-18 RX ADMIN — FENTANYL 1 PATCH: 25 PATCH TRANSDERMAL at 12:01

## 2024-05-18 RX ADMIN — MORPHINE SULFATE 5 MG: 10 SOLUTION ORAL at 20:45

## 2024-05-18 RX ADMIN — MORPHINE SULFATE 2 MG: 2 INJECTION, SOLUTION INTRAMUSCULAR; INTRAVENOUS at 12:01

## 2024-05-18 RX ADMIN — Medication 4 L/MIN: at 20:00

## 2024-05-18 RX ADMIN — MORPHINE SULFATE 5 MG: 10 SOLUTION ORAL at 22:46

## 2024-05-18 RX ADMIN — MORPHINE SULFATE 5 MG: 10 SOLUTION ORAL at 18:36

## 2024-05-18 RX ADMIN — MORPHINE SULFATE 2 MG: 2 INJECTION, SOLUTION INTRAMUSCULAR; INTRAVENOUS at 09:48

## 2024-05-18 RX ADMIN — MORPHINE SULFATE 5 MG: 10 SOLUTION ORAL at 15:05

## 2024-05-18 RX ADMIN — MORPHINE SULFATE 2 MG: 2 INJECTION, SOLUTION INTRAMUSCULAR; INTRAVENOUS at 06:23

## 2024-05-18 RX ADMIN — PANTOPRAZOLE SODIUM 40 MG: 40 TABLET, DELAYED RELEASE ORAL at 12:02

## 2024-05-18 ASSESSMENT — COGNITIVE AND FUNCTIONAL STATUS - GENERAL
WALKING IN HOSPITAL ROOM: A LOT
MOVING TO AND FROM BED TO CHAIR: A LITTLE
DRESSING REGULAR LOWER BODY CLOTHING: A LITTLE
DAILY ACTIVITIY SCORE: 19
TURNING FROM BACK TO SIDE WHILE IN FLAT BAD: A LITTLE
TOILETING: A LITTLE
HELP NEEDED FOR BATHING: A LITTLE
PERSONAL GROOMING: A LITTLE
CLIMB 3 TO 5 STEPS WITH RAILING: A LOT
DRESSING REGULAR UPPER BODY CLOTHING: A LITTLE
STANDING UP FROM CHAIR USING ARMS: A LITTLE
MOBILITY SCORE: 17

## 2024-05-18 ASSESSMENT — PAIN DESCRIPTION - ORIENTATION
ORIENTATION: RIGHT

## 2024-05-18 ASSESSMENT — PAIN SCALES - GENERAL
PAINLEVEL_OUTOF10: 6
PAINLEVEL_OUTOF10: 8
PAINLEVEL_OUTOF10: 7
PAINLEVEL_OUTOF10: 1
PAINLEVEL_OUTOF10: 2
PAINLEVEL_OUTOF10: 2
PAINLEVEL_OUTOF10: 8
PAINLEVEL_OUTOF10: 7
PAINLEVEL_OUTOF10: 7
PAINLEVEL_OUTOF10: 4
PAINLEVEL_OUTOF10: 5 - MODERATE PAIN

## 2024-05-18 ASSESSMENT — PAIN DESCRIPTION - DESCRIPTORS
DESCRIPTORS: ACHING

## 2024-05-18 ASSESSMENT — PAIN DESCRIPTION - LOCATION
LOCATION: SHOULDER
LOCATION: ABDOMEN

## 2024-05-18 ASSESSMENT — PAIN - FUNCTIONAL ASSESSMENT
PAIN_FUNCTIONAL_ASSESSMENT: 0-10

## 2024-05-18 NOTE — CARE PLAN
The patient's goals for the shift include      The clinical goals for the shift include safety and comfort    Over the shift, the patient did not make progress toward the following goals. Barriers to progression include acuteness of illness . Recommendations to address these barriers include implementing interventions as ordered   Problem: Pain  Goal: My pain/discomfort is manageable  Outcome: Progressing     Problem: Safety  Goal: Patient will be injury free during hospitalization  Outcome: Progressing  Goal: I will remain free of falls  Outcome: Progressing     Problem: Daily Care  Goal: Daily care needs are met  Outcome: Progressing     Problem: Psychosocial Needs  Goal: Demonstrates ability to cope with hospitalization/illness  Outcome: Progressing  Goal: Collaborate with me, my family, and caregiver to identify my specific goals  Outcome: Progressing     Problem: Discharge Barriers  Goal: My discharge needs are met  Outcome: Progressing     Problem: Skin  Goal: Participates in plan/prevention/treatment measures  Outcome: Progressing  Flowsheets (Taken 5/18/2024 1731)  Participates in plan/prevention/treatment measures:   Discuss with provider PT/OT consult   Elevate heels  Goal: Prevent/manage excess moisture  Outcome: Progressing  Flowsheets (Taken 5/18/2024 1731)  Prevent/manage excess moisture:   Moisturize dry skin   Cleanse incontinence/protect with barrier cream  Goal: Prevent/minimize sheer/friction injuries  Outcome: Progressing  Flowsheets (Taken 5/18/2024 1731)  Prevent/minimize sheer/friction injuries:   HOB 30 degrees or less   Turn/reposition every 2 hours/use positioning/transfer devices  Goal: Promote/optimize nutrition  Outcome: Progressing  Flowsheets (Taken 5/18/2024 1731)  Promote/optimize nutrition:   Consume > 50% meals/supplements   Monitor/record intake including meals  Goal: Promote skin healing  Outcome: Progressing  Flowsheets (Taken 5/18/2024 1731)  Promote skin healing:   Assess  skin/pad under line(s)/device(s)   Protective dressings over bony prominences   Turn/reposition every 2 hours/use positioning/transfer devices     Problem: Pain - Adult  Goal: Verbalizes/displays adequate comfort level or baseline comfort level  Outcome: Progressing     Problem: Safety - Adult  Goal: Free from fall injury  Outcome: Progressing     Problem: Discharge Planning  Goal: Discharge to home or other facility with appropriate resources  Outcome: Progressing     Problem: Chronic Conditions and Co-morbidities  Goal: Patient's chronic conditions and co-morbidity symptoms are monitored and maintained or improved  Outcome: Progressing     Problem: Pain  Goal: Takes deep breaths with improved pain control throughout the shift  Outcome: Progressing  Goal: Turns in bed with improved pain control throughout the shift  Outcome: Progressing  Goal: Walks with improved pain control throughout the shift  Outcome: Progressing  Goal: Performs ADL's with improved pain control throughout shift  Outcome: Progressing  Goal: Participates in PT with improved pain control throughout the shift  Outcome: Progressing  Goal: Free from opioid side effects throughout the shift  Outcome: Progressing  Goal: Free from acute confusion related to pain meds throughout the shift  Outcome: Progressing   .

## 2024-05-18 NOTE — PROGRESS NOTES
Pt presently in GIP with [facility]. Spoke with RN liaison, who notes possible discharge home tomorrow of pain is controlled. She notes that she will return in the am. SW to continue assist. PABLITO Izquierdo

## 2024-05-18 NOTE — NURSING NOTE
RN Hospice Note    Oleg Yang is a Hospice Patient.   Hospice terminal diagnosis: Colon Cancer  Physician: Dr. Hernan Gupta  Visit type: GIP Visit    Comments/recommendations: Pt remains in hospice GIP level of care.  Oxygen now at 5LNC.  Pt awoke to voice and alert/oriented x3.  Pt reports pain to lower abdomen with movement.  Pt declines offer of transfer to hospice inpatient unit and states wish to go home.  Pt daughter, Marcela Soares at bedside.  Tentative plan developed for discharge home Sunday, 5/19/2024 if symptoms controlled.  MD to change current IV comfort medication orders to oral with plan for reassessment on effectiveness 5/19/2024.  Pt and daughter, Marcela Soares agree with having AICD deactivated  prior to discharge.  Message sent to MD to place order for deactivation.  DME ordered for delivery to pt home 5/19/2024 by 1pm.  Hospice RN to provide visit 5/19/2024 to evaluate effectiveness of oral comfort medications and facilitate discharge home if symptoms managed.      Plan of care reviewed with patient/family members:   1)  Pt  2) Marcela Soares, daughter  3) Tj Soares, son in law  4) Vilma Yang, wife (via phone)    Plan of care reviewed with hospital staff members:   1) Marcela Cordero RN  2) BRYNN Castle  3) Dr. Hernan Gupta     Please notify [facility] of any changes in condition. Thank you.  Office: [phone] (8 am-6:30 pm M-F and 8 am-4:30 pm weekends and holidays)   [phone] (6:30 pm-8 am M-F and 4:30 pm-8 am weekends and holidays)    Britta Gates, JANI

## 2024-05-18 NOTE — H&P
History Of Present Illness  This is a nonbillable certification of terminal illness encounter     78-year-old male admitted to hospice services with a primary diagnosis of metastatic colorectal cancer to the liver and the lungs.  Patient also has a past medical history of congestive heart failure with an ejection fraction of 20 to 25%, cardiac amyloidosis, hypertension, sick sinus syndrome status post pacemaker placement, atrial fibrillation.  Patient was recently diagnosed with a metastatic colon cancer and started on palliative chemotherapy.  On 5/17/2024, patient presented to the emergency room for worsening abdominal pain and shortness of breath.  He was found to have an acute hypoxemic respiratory failure associated with hypotension and tachycardia as well as lactic acidosis and leukocytosis.  Patient was placed on Airvo. CT chest abdomen pelvis was obtained which showed progressive enlargement of bilateral pulmonary nodules as well as enlargement of multiple bilobular hepatic lesions.  Patient was after that admitted to the intensive care unit for further investigation and management.  IV fluids and IV antibiotics were given and patient was started on Levophed.  Prognosis was deemed to be very poor.  Goals of care discussion were held with patient and family.  It was decided not to pursue any further escalation of care and focus on comfort.  Due to declining health status with prognosis of 6 months or less, decision to focus on comfort as the primary goal of care and uncontrolled symptoms (respiratory distress, tachypnea) was taken, and patient was admitted to the hospice service for general inpatient hospice care (for symptom control) after consents for hospice care were signed.  PPS 30%  Life expectancy hours/days      ROS  10 systems were reviewed and were negative except for those noted in the history of present illness.    Past Medical History  Past Medical History:   Diagnosis Date    Other specified  health status     No pertinent past medical history     Pertinent medical history also documented in my below narrative    Surgical History  Past Surgical History:   Procedure Laterality Date    KNEE SURGERY  06/21/2021    Knee Surgery    OTHER SURGICAL HISTORY  06/21/2021    Cardiac catheterization    TOTAL HIP ARTHROPLASTY  06/21/2021    Total Hip Replacement      Pertinent surgical history also documented in my below narrative    Social History  He reports that he has never smoked. He has never used smokeless tobacco. No history on file for alcohol use and drug use.    Family History  Family History   Problem Relation Name Age of Onset    Other (cardiac disorder) Mother      Coronary artery disease Mother      Other (cardiac disorder) Father      Coronary artery disease Father      Hypertension Father      Heart attack Father          Allergies  Patient has no known allergies.    Medications Prior to Admission   Medication Sig Dispense Refill Last Dose    acetaminophen (Tylenol) 325 mg tablet Take 2 tablets (650 mg) by mouth every 4 hours if needed. For pain       amiodarone (Pacerone) 400 mg tablet Take 0.5 tablets (200 mg) by mouth once daily.       apixaban (Eliquis) 5 mg tablet Take 1 tablet (5 mg) by mouth 2 times a day. Do not start before March 10, 2024. 60 tablet 0     Farxiga 10 mg Take 1 tablet (10 mg) by mouth once daily. 90 tablet 3     magnesium oxide (Mag-Ox) 400 mg (241.3 mg magnesium) tablet Take 1 tablet (400 mg) by mouth once daily.       metoprolol succinate XL (Toprol-XL) 50 mg 24 hr tablet Take 1 tablet (50 mg) by mouth once daily.       nitroglycerin (Nitrostat) 0.4 mg SL tablet Place 1 tablet (0.4 mg) under the tongue every 5 minutes if needed.       ondansetron (Zofran) 8 mg tablet Take 1 tablet (8 mg) by mouth every 8 hours if needed for nausea or vomiting. 30 tablet 2     oxybutynin XL (Ditropan-XL) 10 mg 24 hr tablet Take 1 tablet (10 mg) by mouth once daily.       pantoprazole  "(ProtoNix) 40 mg EC tablet Take 1 tablet (40 mg) by mouth once daily. Do not crush, chew, or split. 30 tablet 11     polyethylene glycol (Glycolax, Miralax) 17 gram/dose powder Take 17 g by mouth once daily as needed.       potassium chloride (Klor-Con) 20 mEq packet Take 20 mEq by mouth once daily. 30 packet 11     potassium chloride 10 mEq packet Take 1 packet by mouth 2 times a day. 60 packet 2     prochlorperazine (Compazine) 10 mg tablet Take 1 tablet (10 mg) by mouth every 6 hours if needed for nausea or vomiting. 30 tablet 2     torsemide (Demadex) 10 mg tablet Take 1 tablet (10 mg) by mouth once daily. 30 tablet 11     Vyndamax 61 mg capsule Take 1 tablet by mouth once daily.           Last Recorded Vitals  Blood pressure (!) 78/48, pulse 84, temperature 36.1 °C (97 °F), temperature source Temporal, resp. rate (!) 27, height 1.702 m (5' 7.01\"), weight 73.9 kg (162 lb 14.7 oz), SpO2 100%.    Physical Exam      Relevant Results  Results for orders placed or performed during the hospital encounter of 05/17/24 (from the past 24 hour(s))   MRSA Surveillance for Vancomycin De-escalation, PCR    Specimen: Anterior Nares; Swab   Result Value Ref Range    MRSA PCR Not Detected Not Detected        CT chest abdomen pelvis w IV contrast    Result Date: 5/17/2024  STUDY: CT Chest, Abdomen, and Pelvis with IV Contrast; 5/17/2024 11:44 AM INDICATION: Pain. COMPARISON: 5/17/2024 XR chest; 3/25/2024 PET/CT; 3/3/2024 CT abdomen and pelvis. ACCESSION NUMBER(S): [id number] ORDERING CLINICIAN: ANDREA CARUSO TECHNIQUE: CT of the chest, abdomen, and pelvis was performed.  Contiguous axial images were obtained at 3 mm slice thickness through the chest, abdomen, and pelvis.  Coronal and sagittal reconstructions at 3 mm slice thickness were performed. FINDINGS: CHEST: MEDIASTINUM: The heart is normal in size without pericardial effusion.  Severe coronary artery calcifications.  Borderline aneurysmal dilatation of the ascending " aorta measuring 4 cm.  Enlargement of the main pulmonary artery measuring 4 cm.  Central vascular structures opacify normally. LUNGS/PLEURA: Small bilateral pleural effusions right slightly larger than left. The airways are patent. Again noted are multiple bilateral pulmonary nodules involving all lobes slightly progressive compared to prior with the largest nodule in the right lower lobe on image 52 series 201 measuring 2.6 cm x 2.2 cm previously measuring 2.2 cm x 2 cm.  Bibasilar areas of atelectasis. LYMPH NODES: Thoracic lymph nodes are not enlarged. ABDOMEN:  LIVER: No hepatomegaly.  Nodular contour the liver suggestive of underlying cirrhosis.  There is fatty infiltration of the liver.  Again noted are multiple bilobar hepatic lesions slightly progressive compared to prior with largest lesion near the dome on image 80 measuring 6 cm x 5 cm previously measuring 5 cm x 4.4 cm on prior PET/CT.  BILE DUCTS: Mild biliary dilatation with the common bile duct measuring up to approximately 1 cm.  Questionable choledocholithiasis with small stones in the distal duct on image 123 measuring 4 to 5 mm.  GALLBLADDER: The gallbladder is distended with underlying gallstones and some associated gallbladder wall thickening. STOMACH: No abnormalities identified.  PANCREAS: No masses or ductal dilatation.  SPLEEN: No splenomegaly or focal splenic lesion.  ADRENAL GLANDS: No thickening or nodules.  KIDNEYS AND URETERS: Kidneys are normal in size and location.  Nonobstructing inferior pole right renal calculus measuring 5 mm without ureteral calculus or hydronephrosis  PELVIS:  BLADDER: No abnormalities identified.  REPRODUCTIVE ORGANS: Prostate gland is mildly enlarged measuring 5.6 cm.  BOWEL: Again noted is masslike wall thickening of the cecum largely stable compared to prior  VESSELS: No abnormalities identified.  Abdominal aorta is normal in caliber.  PERITONEUM/RETROPERITONEUM/LYMPH NODES: No free fluid.  No  pneumoperitoneum.  Bilateral inguinal hernias containing fat and nonobstructed bowel. Mildly enlarged retroperitoneal, upper abdominal and pelvic lymph nodes similar compared to prior.  ABDOMINAL WALL: No abnormalities identified. SOFT TISSUES: No abnormalities identified.  BONES: No acute fracture or aggressive osseous lesion.    1. No pulmonary embolism or acute aortic pathology. 2. Again noted are multiple bilateral pulmonary nodules involving all lobes slightly progressive compared to prior with the largest nodule in the right lower lobe measuring 2.6 cm x 2.2 cm previously measuring 2.2 cm x 2 cm. Findings compatible with metastatic disease. 3. Again noted are multiple bilobar hepatic lesions slightly progressive compared to prior with largest lesion near the dome measuring 6 cm x 5 cm previously measuring 5 cm x 4.4 cm on prior PET/CT. Findings compatible with metastatic disease. 4. Mild biliary dilatation with the common bile duct measuring up to approximately 1 cm. Questionable choledocholithiasis with small stones in the distal duct measuring 4 to 5 mm. Consider further evaluation with MRCP. 5. The gallbladder is distended with underlying gallstones and some associated gallbladder wall thickening. If there is a clinical concern for cholecystitis, recommend further evaluation with a HIDA scan. 6. Again noted is masslike wall thickening of the cecum largely stable compared to prior. Findings compatible with a primary colon carcinoma. 7. Mildly enlarged retroperitoneal, upper abdominal and pelvic lymph nodes similar compared to prior compatible with metastatic disease. 8. Severe coronary artery calcifications. 9. Borderline aneurysmal dilatation of the ascending aorta measuring 4 cm. 10. Enlargement of the main pulmonary artery measuring 4 cm. Findings suggestive of pulmonary arterial hypertension. 11. Nonobstructing inferior pole right renal calculus measuring 5 mm without ureteral calculus or hydronephrosis.  12. Bilateral inguinal hernias containing fat and nonobstructed bowel. 13. Mildly enlarged prostate. Signed by Erick Freed MD    XR chest 1 view    Result Date: 5/17/2024  STUDY: Chest Radiograph;  5/17/2024 at 10:05 AM INDICATION: Dyspnea. COMPARISON: 3/26/2023 Chest XR, 9/1/2022 Chest XR. ACCESSION NUMBER(S): [id number] ORDERING CLINICIAN: ANDREA CARUSO TECHNIQUE:  Frontal chest was obtained at 1004 hours. FINDINGS: CARDIOMEDIASTINAL SILHOUETTE: Heart is enlarged with left-sided cardiac AICD.  Right-sided Irvawn-a-[id number] with tip in the superior vena cava.  LUNGS: Multiple nodular opacities bilaterally, new compared to 3/26/2023 measuring up to 2.6 cm in the right lung.  ABDOMEN: No remarkable upper abdominal findings.  BONES: No acute osseous changes.    Multiple nodular opacities bilaterally, new compared to 3/26/2023 measuring up to 2.6 cm in the right lung. Findings compatible with metastatic disease. Recommend further evaluation with CT. Signed by Erick Freed MD       Assessment/Plan     -Actively declining condition so no further escalation of care  -Admit to hospice GIP care: primary goal is maintaining dignity and comfort with optimal symptom management  -Support and education provided to patient's family at bedside  -DNR CC   -Vital signs daily  -Give morphine 2 mg IV 1 dose stat and taper down from the Airvo to oxygen 2-4 liters via NC prn comfort  -Regular diet and per patient preference  -Morphine 2 mg IV every 1 hour as needed for pain, shortness of breath, tachypnea, facial grimacing, moaning  -Ativan 1mg IV q1h prn for anxiety, restlessness, and/or insomnia  -Haldol 1mg IV q4h prn for agitation, nausea, and/or vomiting  -Robinul 0.2mg IV q4h prn for secretions  -Tylenol suppo 650 mg q4h prn for fever  -Dulcolax suppository every other day as needed for constipation  -Mouthcare qshift  -Turn patient q2h if possible    (This note was generated with voice recognition software and may contain  errors including spelling, grammar, syntax and misrecognition of what was dictated, that are not fully corrected)     Hernan Gupta MD

## 2024-05-18 NOTE — PROGRESS NOTES
Vancomycin Dosing by Pharmacy- Cessation of Therapy    Consult to pharmacy for vancomycin dosing has been discontinued by the prescriber, pharmacy will sign off at this time.    Please call pharmacy if there are further questions or re-enter a consult if vancomycin is resumed.     Selina Blackmon, PharmD, BCPS   5/18/2024 10:06 AM

## 2024-05-19 VITALS
HEIGHT: 67 IN | RESPIRATION RATE: 32 BRPM | OXYGEN SATURATION: 97 % | DIASTOLIC BLOOD PRESSURE: 50 MMHG | HEART RATE: 101 BPM | TEMPERATURE: 98.6 F | BODY MASS INDEX: 26.51 KG/M2 | WEIGHT: 168.87 LBS | SYSTOLIC BLOOD PRESSURE: 83 MMHG

## 2024-05-19 PROCEDURE — 2500000001 HC RX 250 WO HCPCS SELF ADMINISTERED DRUGS (ALT 637 FOR MEDICARE OP): Performed by: INTERNAL MEDICINE

## 2024-05-19 PROCEDURE — 2500000004 HC RX 250 GENERAL PHARMACY W/ HCPCS (ALT 636 FOR OP/ED): Performed by: INTERNAL MEDICINE

## 2024-05-19 RX ORDER — POLYETHYLENE GLYCOL 3350 17 G/17G
17 POWDER, FOR SOLUTION ORAL DAILY
Start: 2024-05-19 | End: 2024-06-18

## 2024-05-19 RX ORDER — FENTANYL 25 UG/1
1 PATCH TRANSDERMAL
Start: 2024-05-21

## 2024-05-19 RX ADMIN — MORPHINE SULFATE 5 MG: 10 SOLUTION ORAL at 03:49

## 2024-05-19 RX ADMIN — POLYETHYLENE GLYCOL 3350 17 G: 17 POWDER, FOR SOLUTION ORAL at 09:00

## 2024-05-19 RX ADMIN — MORPHINE SULFATE 5 MG: 10 SOLUTION ORAL at 01:20

## 2024-05-19 RX ADMIN — PANTOPRAZOLE SODIUM 40 MG: 40 TABLET, DELAYED RELEASE ORAL at 09:03

## 2024-05-19 RX ADMIN — MORPHINE SULFATE 5 MG: 10 SOLUTION ORAL at 11:43

## 2024-05-19 RX ADMIN — MORPHINE SULFATE 5 MG: 10 SOLUTION ORAL at 06:11

## 2024-05-19 RX ADMIN — MORPHINE SULFATE 5 MG: 10 SOLUTION ORAL at 09:03

## 2024-05-19 ASSESSMENT — PAIN DESCRIPTION - DESCRIPTORS
DESCRIPTORS: ACHING

## 2024-05-19 ASSESSMENT — PAIN SCALES - GENERAL
PAINLEVEL_OUTOF10: 7
PAINLEVEL_OUTOF10: 4
PAINLEVEL_OUTOF10: 7
PAINLEVEL_OUTOF10: 4
PAINLEVEL_OUTOF10: 7
PAINLEVEL_OUTOF10: 4

## 2024-05-19 ASSESSMENT — COGNITIVE AND FUNCTIONAL STATUS - GENERAL
WALKING IN HOSPITAL ROOM: A LOT
STANDING UP FROM CHAIR USING ARMS: A LITTLE
TOILETING: A LITTLE
DRESSING REGULAR UPPER BODY CLOTHING: A LITTLE
MOVING TO AND FROM BED TO CHAIR: A LITTLE
TURNING FROM BACK TO SIDE WHILE IN FLAT BAD: A LITTLE
DRESSING REGULAR LOWER BODY CLOTHING: A LITTLE
PERSONAL GROOMING: A LITTLE
HELP NEEDED FOR BATHING: A LITTLE
MOBILITY SCORE: 17
CLIMB 3 TO 5 STEPS WITH RAILING: A LOT
DAILY ACTIVITIY SCORE: 19

## 2024-05-19 ASSESSMENT — PAIN - FUNCTIONAL ASSESSMENT
PAIN_FUNCTIONAL_ASSESSMENT: 0-10
PAIN_FUNCTIONAL_ASSESSMENT: 0-10

## 2024-05-19 ASSESSMENT — PAIN DESCRIPTION - LOCATION: LOCATION: SHOULDER

## 2024-05-19 ASSESSMENT — ACTIVITIES OF DAILY LIVING (ADL)
ADEQUATE_TO_COMPLETE_ADL: YES
JUDGMENT_ADEQUATE_SAFELY_COMPLETE_DAILY_ACTIVITIES: YES

## 2024-05-19 ASSESSMENT — PAIN DESCRIPTION - ORIENTATION: ORIENTATION: RIGHT

## 2024-05-19 NOTE — DISCHARGE SUMMARY
Discharge Diagnosis  Colon cancer    Hospital Course   78-year-old male admitted to hospice services with a primary diagnosis of metastatic colorectal cancer to the liver and the lungs.  Patient also has a past medical history of congestive heart failure with an ejection fraction of 20 to 25%, cardiac amyloidosis, hypertension, sick sinus syndrome status post pacemaker placement, atrial fibrillation.  Patient was recently diagnosed with a metastatic colon cancer and started on palliative chemotherapy.  On 5/17/2024, patient presented to the emergency room for worsening abdominal pain and shortness of breath.  He was found to have an acute hypoxemic respiratory failure associated with hypotension and tachycardia as well as lactic acidosis and leukocytosis.  Patient was placed on Airvo. CT chest abdomen pelvis was obtained which showed progressive enlargement of bilateral pulmonary nodules as well as enlargement of multiple bilobular hepatic lesions.  Patient was after that admitted to the intensive care unit for further investigation and management.  IV fluids and IV antibiotics were given and patient was started on Levophed.  Prognosis was deemed to be very poor.  Goals of care discussion were held with patient and family.  It was decided not to pursue any further escalation of care and focus on comfort.  Patient was admitted to [facility] hospice services on 5/18/2024.  Morphine was given and patient was successively weaned off the Airvo to nasal cannula oxygen.  He remained hemodynamically stable after that.  Fentanyl patch 25 mcg was placed.  And patient's symptoms became better controlled by 5/19/2024.  So patient was discharged to hospice facility.

## 2024-05-19 NOTE — NURSING NOTE
RN Hospice Note    Oleg Yang is a Hospice Patient.   Hospice terminal diagnosis: Colon Cancer  Physician: Dr. Hernan Gupta  Visit type: GIP Visit    Comments/recommendations: Pt pain remains uncontrolled.  Hospice inpatient unit discussed with pt and daughter, Marcela Soares for continued symptom management prior to returning home.  Pt wife, Vilma updated via phone. All voiced agreement with plan.  Dr. Hernan Gupta provided discharge order. Medical records faxed. Report called.  Transport arranged via [facility] for 12:00-12:30 .      Plan of care reviewed with patient/family members:   1) Pt  2) Marcela Soares, daughter  3) Tj Soares, son in law  4) Vilma Yang, wife (via phone)     Plan of care reviewed with hospital staff members:   1) Amanda Gunn RN  2) BRYNN Castle  3) Dr. Hernan Gupta    Please notify [facility] of any changes in condition. Thank you.  Office: [phone] (8 am-6:30 pm M-F and 8 am-4:30 pm weekends and holidays)   [phone] (6:30 pm-8 am M-F and 4:30 pm-8 am weekends and holidays)    Britta Gates RN

## 2024-05-20 DIAGNOSIS — C79.9 METASTATIC MALIGNANT NEOPLASM, UNSPECIFIED SITE (MULTI): ICD-10-CM

## 2024-05-20 DIAGNOSIS — C18.3 MALIGNANT NEOPLASM OF HEPATIC FLEXURE (MULTI): Primary | ICD-10-CM

## 2024-05-20 RX ORDER — PROCHLORPERAZINE EDISYLATE 5 MG/ML
10 INJECTION INTRAMUSCULAR; INTRAVENOUS EVERY 6 HOURS PRN
OUTPATIENT
Start: 2024-06-25

## 2024-05-20 RX ORDER — FLUOROURACIL 50 MG/ML
300 INJECTION, SOLUTION INTRAVENOUS ONCE
OUTPATIENT
Start: 2024-06-25

## 2024-05-20 RX ORDER — PROCHLORPERAZINE MALEATE 5 MG
10 TABLET ORAL EVERY 6 HOURS PRN
OUTPATIENT
Start: 2024-06-25

## 2024-05-20 RX ORDER — DIPHENHYDRAMINE HYDROCHLORIDE 50 MG/ML
50 INJECTION INTRAMUSCULAR; INTRAVENOUS AS NEEDED
OUTPATIENT
Start: 2024-06-25

## 2024-05-20 RX ORDER — EPINEPHRINE 0.3 MG/.3ML
0.3 INJECTION SUBCUTANEOUS EVERY 5 MIN PRN
OUTPATIENT
Start: 2024-06-25

## 2024-05-20 RX ORDER — ALBUTEROL SULFATE 0.83 MG/ML
3 SOLUTION RESPIRATORY (INHALATION) AS NEEDED
OUTPATIENT
Start: 2024-06-25

## 2024-05-20 RX ORDER — LORAZEPAM 2 MG/ML
1 INJECTION INTRAMUSCULAR AS NEEDED
OUTPATIENT
Start: 2024-06-25

## 2024-05-20 RX ORDER — PALONOSETRON 0.05 MG/ML
0.25 INJECTION, SOLUTION INTRAVENOUS ONCE
OUTPATIENT
Start: 2024-06-25

## 2024-05-20 RX ORDER — FAMOTIDINE 10 MG/ML
20 INJECTION INTRAVENOUS ONCE AS NEEDED
OUTPATIENT
Start: 2024-06-25

## 2024-05-21 VITALS — BODY MASS INDEX: 26.51 KG/M2 | HEIGHT: 67 IN

## 2024-05-21 LAB
BACTERIA BLD AEROBE CULT: ABNORMAL
BACTERIA BLD CULT: ABNORMAL
GRAM STN SPEC: ABNORMAL
GRAM STN SPEC: ABNORMAL

## 2024-05-22 LAB
BACTERIA BLD AEROBE CULT: ABNORMAL
BACTERIA BLD CULT: ABNORMAL
GRAM STN SPEC: ABNORMAL
Q ONSET: 209 MS
QRS COUNT: 17 BEATS
QRS DURATION: 100 MS
QT INTERVAL: 382 MS
QTC CALCULATION(BAZETT): 502 MS
QTC FREDERICIA: 459 MS
R AXIS: 268 DEGREES
T AXIS: 94 DEGREES
T OFFSET: 400 MS
VENTRICULAR RATE: 104 BPM

## 2024-05-24 ENCOUNTER — APPOINTMENT (OUTPATIENT)
Dept: HEMATOLOGY/ONCOLOGY | Facility: CLINIC | Age: 79
End: 2024-05-24
Payer: MEDICARE

## 2024-05-28 ENCOUNTER — APPOINTMENT (OUTPATIENT)
Dept: HEMATOLOGY/ONCOLOGY | Facility: CLINIC | Age: 79
End: 2024-05-28
Payer: MEDICARE

## 2024-05-30 ENCOUNTER — APPOINTMENT (OUTPATIENT)
Dept: HEMATOLOGY/ONCOLOGY | Facility: CLINIC | Age: 79
End: 2024-05-30
Payer: MEDICARE

## 2024-06-07 ENCOUNTER — APPOINTMENT (OUTPATIENT)
Dept: HEMATOLOGY/ONCOLOGY | Facility: CLINIC | Age: 79
End: 2024-06-07
Payer: MEDICARE

## 2024-06-10 ENCOUNTER — APPOINTMENT (OUTPATIENT)
Dept: HEMATOLOGY/ONCOLOGY | Facility: CLINIC | Age: 79
End: 2024-06-10
Payer: MEDICARE

## 2024-06-12 ENCOUNTER — APPOINTMENT (OUTPATIENT)
Dept: HEMATOLOGY/ONCOLOGY | Facility: CLINIC | Age: 79
End: 2024-06-12
Payer: MEDICARE

## 2024-06-21 ENCOUNTER — APPOINTMENT (OUTPATIENT)
Dept: PRIMARY CARE | Facility: CLINIC | Age: 79
End: 2024-06-21
Payer: MEDICARE

## 2024-06-21 ENCOUNTER — APPOINTMENT (OUTPATIENT)
Dept: HEMATOLOGY/ONCOLOGY | Facility: CLINIC | Age: 79
End: 2024-06-21
Payer: MEDICARE

## 2024-06-24 ENCOUNTER — APPOINTMENT (OUTPATIENT)
Dept: HEMATOLOGY/ONCOLOGY | Facility: CLINIC | Age: 79
End: 2024-06-24
Payer: MEDICARE

## 2024-06-26 ENCOUNTER — APPOINTMENT (OUTPATIENT)
Dept: HEMATOLOGY/ONCOLOGY | Facility: CLINIC | Age: 79
End: 2024-06-26
Payer: MEDICARE

## 2024-07-01 ENCOUNTER — APPOINTMENT (OUTPATIENT)
Dept: CARDIOLOGY | Facility: CLINIC | Age: 79
End: 2024-07-01
Payer: MEDICARE

## 2024-07-05 ENCOUNTER — APPOINTMENT (OUTPATIENT)
Dept: HEMATOLOGY/ONCOLOGY | Facility: CLINIC | Age: 79
End: 2024-07-05
Payer: MEDICARE

## 2024-07-08 ENCOUNTER — APPOINTMENT (OUTPATIENT)
Dept: HEMATOLOGY/ONCOLOGY | Facility: CLINIC | Age: 79
End: 2024-07-08
Payer: MEDICARE

## 2024-07-08 ENCOUNTER — APPOINTMENT (OUTPATIENT)
Dept: CARDIOLOGY | Facility: CLINIC | Age: 79
End: 2024-07-08
Payer: MEDICARE

## 2024-07-10 ENCOUNTER — APPOINTMENT (OUTPATIENT)
Dept: HEMATOLOGY/ONCOLOGY | Facility: CLINIC | Age: 79
End: 2024-07-10
Payer: MEDICARE

## 2024-08-07 NOTE — ANESTHESIA POSTPROCEDURE EVALUATION
Patient: Oleg Yang    Procedure Summary       Date: 03/06/24 Room / Location: [facility]    Anesthesia Start: 1341 Anesthesia Stop:     Procedures:       COLONOSCOPY      EGD Diagnosis:       Abnormal CT scan, colon      Metastatic malignant neoplasm, unspecified site (CMS/HCC)    Scheduled Providers: Paras García MD; Vipul Lu MD Responsible Provider: Jose Manuel Coulter MD    Anesthesia Type: MAC ASA Status: 3            Anesthesia Type: MAC    Vitals Value Taken Time   BP 99/59 03/06/24 1523   Temp 37.0 03/06/24 1523   Pulse 76 03/06/24 1523   Resp 18 03/06/24 1523   SpO2 97% 03/06/24 1523       Anesthesia Post Evaluation    Patient location during evaluation: PACU  Patient participation: waiting for patient participation  Level of consciousness: sleepy but conscious  Pain management: adequate  Airway patency: patent  Cardiovascular status: acceptable  Respiratory status: acceptable and room air  Hydration status: acceptable  Postoperative Nausea and Vomiting: none        No notable events documented.    
[id number]

## 2024-10-09 ENCOUNTER — APPOINTMENT (OUTPATIENT)
Dept: CARDIOLOGY | Facility: CLINIC | Age: 79
End: 2024-10-09
Payer: MEDICARE